# Patient Record
Sex: FEMALE | Race: WHITE | NOT HISPANIC OR LATINO | URBAN - METROPOLITAN AREA
[De-identification: names, ages, dates, MRNs, and addresses within clinical notes are randomized per-mention and may not be internally consistent; named-entity substitution may affect disease eponyms.]

---

## 2017-04-03 ENCOUNTER — APPOINTMENT (RX ONLY)
Dept: URBAN - METROPOLITAN AREA CLINIC 349 | Facility: CLINIC | Age: 62
Setting detail: DERMATOLOGY
End: 2017-04-03

## 2017-04-03 DIAGNOSIS — L98.8 OTHER SPECIFIED DISORDERS OF THE SKIN AND SUBCUTANEOUS TISSUE: ICD-10-CM

## 2017-04-03 PROBLEM — L90.8 OTHER ATROPHIC DISORDERS OF SKIN: Status: ACTIVE | Noted: 2017-04-03

## 2017-04-03 PROCEDURE — ? COUNSELING

## 2017-04-03 PROCEDURE — ? BOTOX

## 2017-04-03 ASSESSMENT — LOCATION SIMPLE DESCRIPTION DERM
LOCATION SIMPLE: GLABELLA
LOCATION SIMPLE: SUPERIOR FOREHEAD
LOCATION SIMPLE: RIGHT TEMPLE
LOCATION SIMPLE: LEFT EYELID

## 2017-04-03 ASSESSMENT — LOCATION DETAILED DESCRIPTION DERM
LOCATION DETAILED: LEFT LATERAL CANTHUS
LOCATION DETAILED: RIGHT INFERIOR TEMPLE
LOCATION DETAILED: SUPERIOR MID FOREHEAD
LOCATION DETAILED: GLABELLA

## 2017-04-03 ASSESSMENT — LOCATION ZONE DERM
LOCATION ZONE: EYELID
LOCATION ZONE: FACE

## 2017-06-05 ENCOUNTER — APPOINTMENT (RX ONLY)
Dept: URBAN - METROPOLITAN AREA CLINIC 349 | Facility: CLINIC | Age: 62
Setting detail: DERMATOLOGY
End: 2017-06-05

## 2017-06-05 DIAGNOSIS — L82.1 OTHER SEBORRHEIC KERATOSIS: ICD-10-CM

## 2017-06-05 DIAGNOSIS — L85.3 XEROSIS CUTIS: ICD-10-CM

## 2017-06-05 DIAGNOSIS — D485 NEOPLASM OF UNCERTAIN BEHAVIOR OF SKIN: ICD-10-CM

## 2017-06-05 DIAGNOSIS — D22 MELANOCYTIC NEVI: ICD-10-CM | Status: STABLE

## 2017-06-05 DIAGNOSIS — Z87.2 PERSONAL HISTORY OF DISEASES OF THE SKIN AND SUBCUTANEOUS TISSUE: ICD-10-CM

## 2017-06-05 DIAGNOSIS — Z85.828 PERSONAL HISTORY OF OTHER MALIGNANT NEOPLASM OF SKIN: ICD-10-CM

## 2017-06-05 DIAGNOSIS — L82.0 INFLAMED SEBORRHEIC KERATOSIS: ICD-10-CM

## 2017-06-05 PROBLEM — D22.5 MELANOCYTIC NEVI OF TRUNK: Status: ACTIVE | Noted: 2017-06-05

## 2017-06-05 PROBLEM — F41.9 ANXIETY DISORDER, UNSPECIFIED: Status: ACTIVE | Noted: 2017-06-05

## 2017-06-05 PROBLEM — D22.72 MELANOCYTIC NEVI OF LEFT LOWER LIMB, INCLUDING HIP: Status: ACTIVE | Noted: 2017-06-05

## 2017-06-05 PROBLEM — D48.5 NEOPLASM OF UNCERTAIN BEHAVIOR OF SKIN: Status: ACTIVE | Noted: 2017-06-05

## 2017-06-05 PROBLEM — D22.71 MELANOCYTIC NEVI OF RIGHT LOWER LIMB, INCLUDING HIP: Status: ACTIVE | Noted: 2017-06-05

## 2017-06-05 PROBLEM — D22.62 MELANOCYTIC NEVI OF LEFT UPPER LIMB, INCLUDING SHOULDER: Status: ACTIVE | Noted: 2017-06-05

## 2017-06-05 PROBLEM — D22.61 MELANOCYTIC NEVI OF RIGHT UPPER LIMB, INCLUDING SHOULDER: Status: ACTIVE | Noted: 2017-06-05

## 2017-06-05 PROBLEM — L55.1 SUNBURN OF SECOND DEGREE: Status: ACTIVE | Noted: 2017-06-05

## 2017-06-05 PROBLEM — K21.9 GASTRO-ESOPHAGEAL REFLUX DISEASE WITHOUT ESOPHAGITIS: Status: ACTIVE | Noted: 2017-06-05

## 2017-06-05 PROCEDURE — ? BODY PHOTOGRAPHY

## 2017-06-05 PROCEDURE — ? COUNSELING

## 2017-06-05 PROCEDURE — 17110 DESTRUCTION B9 LES UP TO 14: CPT

## 2017-06-05 PROCEDURE — ? MEDICAL PHOTOGRAPHY REVIEW

## 2017-06-05 PROCEDURE — ? LIQUID NITROGEN

## 2017-06-05 PROCEDURE — 99214 OFFICE O/P EST MOD 30 MIN: CPT | Mod: 25

## 2017-06-05 PROCEDURE — ? TREATMENT REGIMEN

## 2017-06-05 PROCEDURE — ? OBSERVATION

## 2017-06-05 ASSESSMENT — LOCATION SIMPLE DESCRIPTION DERM
LOCATION SIMPLE: LEFT POSTERIOR UPPER ARM
LOCATION SIMPLE: RIGHT BACK
LOCATION SIMPLE: RIGHT UPPER ARM
LOCATION SIMPLE: UPPER BACK
LOCATION SIMPLE: LEFT PRETIBIAL REGION
LOCATION SIMPLE: LEFT BUTTOCK
LOCATION SIMPLE: RIGHT THIGH
LOCATION SIMPLE: CHEST
LOCATION SIMPLE: LEFT UPPER BACK
LOCATION SIMPLE: RIGHT POSTERIOR THIGH
LOCATION SIMPLE: RIGHT POSTERIOR UPPER ARM
LOCATION SIMPLE: RIGHT PRETIBIAL REGION
LOCATION SIMPLE: LEFT POSTERIOR THIGH
LOCATION SIMPLE: RIGHT KNEE
LOCATION SIMPLE: LEFT CLAVICULAR SKIN
LOCATION SIMPLE: RIGHT UPPER BACK

## 2017-06-05 ASSESSMENT — LOCATION DETAILED DESCRIPTION DERM
LOCATION DETAILED: LEFT DISTAL POSTERIOR THIGH
LOCATION DETAILED: LEFT CLAVICULAR SKIN
LOCATION DETAILED: LEFT BUTTOCK
LOCATION DETAILED: LEFT PROXIMAL POSTERIOR UPPER ARM
LOCATION DETAILED: SUPERIOR THORACIC SPINE
LOCATION DETAILED: RIGHT MEDIAL PROXIMAL PRETIBIAL REGION
LOCATION DETAILED: RIGHT PROXIMAL POSTERIOR UPPER ARM
LOCATION DETAILED: LEFT LATERAL PROXIMAL PRETIBIAL REGION
LOCATION DETAILED: RIGHT SUPERIOR LATERAL UPPER BACK
LOCATION DETAILED: RIGHT SUPERIOR UPPER BACK
LOCATION DETAILED: RIGHT ANTERIOR DISTAL UPPER ARM
LOCATION DETAILED: RIGHT KNEE
LOCATION DETAILED: RIGHT PROXIMAL PRETIBIAL REGION
LOCATION DETAILED: RIGHT ANTERIOR PROXIMAL THIGH
LOCATION DETAILED: UPPER STERNUM
LOCATION DETAILED: RIGHT PROXIMAL POSTERIOR THIGH
LOCATION DETAILED: INFERIOR THORACIC SPINE
LOCATION DETAILED: RIGHT INFERIOR MEDIAL UPPER BACK
LOCATION DETAILED: LEFT INFERIOR MEDIAL UPPER BACK

## 2017-06-05 ASSESSMENT — LOCATION ZONE DERM
LOCATION ZONE: LEG
LOCATION ZONE: ARM
LOCATION ZONE: TRUNK

## 2017-06-05 NOTE — PROCEDURE: MEDICAL PHOTOGRAPHY REVIEW
Detail Level: Detailed
Review Findings: no new or changing lesions
Number Of Photographs Reviewed: 2

## 2017-06-05 NOTE — PROCEDURE: LIQUID NITROGEN
Medical Necessity Information: It is in your best interest to select a reason for this procedure from the list below. All of these items fulfill various CMS LCD requirements except the new and changing color options.
Render Post-Care Instructions In Note?: yes
Detail Level: Detailed
Post-Care Instructions: I reviewed with the patient in detail post-care instructions. Patient is to wear sunprotection, and avoid picking at any of the treated lesions. Pt may apply Vaseline to crusted or scabbing areas.
Medical Necessity Clause: This procedure was medically necessary because the lesions that were treated were:
Add 52 Modifier (Optional): no
Consent: The patient's consent was obtained including but not limited to risks of crusting, scabbing, blistering, scarring, darker or lighter pigmentary change, recurrence, incomplete removal and infection.

## 2017-06-05 NOTE — PROCEDURE: TREATMENT REGIMEN
Detail Level: Detailed
Initiate Treatment: Excipial 20% urea cream apply to affected area twice daily, this was left at checkout for patient

## 2017-06-05 NOTE — PROCEDURE: BODY PHOTOGRAPHY
Was The Entire Body Photographed (Cannot Bill Unless Entire Body Photographed)?: No
Reason For Photography: The patient is obtaining whole body photography to observe existing suspicious moles and or monitor for the appearance of any new lesions.
Number Of Photographs (Optional- Will Not Render If 0): 1
Detail Level: Detailed
Consent: Written consent obtained, risks reviewed for whole body photography. Patient understands that photograph costs may not be covered by insurance, and patient is ultimately responsible for payment.
Whole Body Statement: The whole body was photographed today.

## 2017-08-07 ENCOUNTER — APPOINTMENT (RX ONLY)
Dept: URBAN - METROPOLITAN AREA CLINIC 349 | Facility: CLINIC | Age: 62
Setting detail: DERMATOLOGY
End: 2017-08-07

## 2017-08-07 DIAGNOSIS — L98.8 OTHER SPECIFIED DISORDERS OF THE SKIN AND SUBCUTANEOUS TISSUE: ICD-10-CM

## 2017-08-07 PROBLEM — L55.1 SUNBURN OF SECOND DEGREE: Status: ACTIVE | Noted: 2017-08-07

## 2017-08-07 PROBLEM — L90.8 OTHER ATROPHIC DISORDERS OF SKIN: Status: ACTIVE | Noted: 2017-08-07

## 2017-08-07 PROCEDURE — ? BOTOX

## 2017-08-07 PROCEDURE — ? COUNSELING

## 2017-08-07 ASSESSMENT — LOCATION ZONE DERM
LOCATION ZONE: FACE
LOCATION ZONE: EYELID

## 2017-08-07 ASSESSMENT — LOCATION SIMPLE DESCRIPTION DERM
LOCATION SIMPLE: LEFT EYELID
LOCATION SIMPLE: GLABELLA
LOCATION SIMPLE: RIGHT TEMPLE
LOCATION SIMPLE: SUPERIOR FOREHEAD

## 2017-08-07 ASSESSMENT — LOCATION DETAILED DESCRIPTION DERM
LOCATION DETAILED: RIGHT INFERIOR TEMPLE
LOCATION DETAILED: GLABELLA
LOCATION DETAILED: LEFT LATERAL CANTHUS
LOCATION DETAILED: SUPERIOR MID FOREHEAD

## 2017-10-12 ENCOUNTER — APPOINTMENT (RX ONLY)
Dept: URBAN - METROPOLITAN AREA CLINIC 349 | Facility: CLINIC | Age: 62
Setting detail: DERMATOLOGY
End: 2017-10-12

## 2017-10-12 DIAGNOSIS — D485 NEOPLASM OF UNCERTAIN BEHAVIOR OF SKIN: ICD-10-CM

## 2017-10-12 PROBLEM — D48.5 NEOPLASM OF UNCERTAIN BEHAVIOR OF SKIN: Status: ACTIVE | Noted: 2017-10-12

## 2017-10-12 PROBLEM — C44.712 BASAL CELL CARCINOMA OF SKIN OF RIGHT LOWER LIMB, INCLUDING HIP: Status: ACTIVE | Noted: 2017-10-12

## 2017-10-12 PROCEDURE — 88305 TISSUE EXAM BY PATHOLOGIST: CPT

## 2017-10-12 PROCEDURE — ? COUNSELING

## 2017-10-12 PROCEDURE — 11100: CPT

## 2017-10-12 PROCEDURE — ? OBSERVATION

## 2017-10-12 PROCEDURE — ? PATHOLOGY BILLING

## 2017-10-12 PROCEDURE — A4550 SURGICAL TRAYS: HCPCS

## 2017-10-12 PROCEDURE — ? BIOPSY BY SHAVE METHOD

## 2017-10-12 PROCEDURE — 99213 OFFICE O/P EST LOW 20 MIN: CPT | Mod: 25

## 2017-10-12 ASSESSMENT — LOCATION ZONE DERM: LOCATION ZONE: LEG

## 2017-10-12 ASSESSMENT — LOCATION SIMPLE DESCRIPTION DERM: LOCATION SIMPLE: LEFT PRETIBIAL REGION

## 2017-10-12 ASSESSMENT — LOCATION DETAILED DESCRIPTION DERM: LOCATION DETAILED: LEFT DISTAL PRETIBIAL REGION

## 2017-10-12 NOTE — PROCEDURE: BIOPSY BY SHAVE METHOD
Electrodesiccation And Curettage Text: The wound bed was treated with electrodesiccation and curettage after the biopsy was performed.
Post-Care Instructions: I reviewed with the patient in detail post-care instructions. Patient is to keep the biopsy site dry overnight, and then apply Vaseline  daily until healed. Patient may apply hydrogen peroxide soaks to remove any crusting. After the procedure, the patient was oriented to person, place, and time. Patient denied feeling dizzy, queasy, and and declined further observation after initial 5 minute observation time.
Biopsy Method: Dermablade
Billing Type: Third-Party Bill
Bill 39324 For Specimen Handling/Conveyance To Laboratory?: no
Anesthesia Volume In Cc (Will Not Render If 0): 0.5
X Size Of Lesion In Cm: 0
Bill For Surgical Tray: yes
Electrodesiccation Text: The wound bed was treated with electrodesiccation after the biopsy was performed.
Hemostasis: Aluminum Chloride
Dressing: bandage
Curettage Text: The wound bed was treated with curettage after the biopsy was performed.
Notification Instructions: Patient will be notified of biopsy results. However, patient instructed to call the office if not contacted within 2 weeks. After the procedure, the patient was oriented to person, place, and time. Patient denied feeling dizzy, queasy, and and declined further observation after initial 5 minute observation time.
Type Of Destruction Used: Curettage
Wound Care: Vaseline
Consent: Written consent was obtained and risks were reviewed including but not limited to scarring, infection, bleeding, scabbing, incomplete removal, nerve damage and allergy to anesthesia.
Silver Nitrate Text: The wound bed was treated with silver nitrate after the biopsy was performed.
Accession #: [id number]
Biopsy Type: H and E
Anesthesia Type: 1% lidocaine with epinephrine
Detail Level: Detailed
Cryotherapy Text: The wound bed was treated with cryotherapy after the biopsy was performed.

## 2017-11-13 ENCOUNTER — HOSPITAL ENCOUNTER (OUTPATIENT)
Dept: MAMMOGRAPHY | Age: 62
Discharge: HOME | End: 2017-11-13
Attending: OBSTETRICS & GYNECOLOGY
Payer: COMMERCIAL

## 2017-11-13 DIAGNOSIS — Z12.31 SCREENING MAMMOGRAM, ENCOUNTER FOR: ICD-10-CM

## 2017-11-13 PROCEDURE — 77067 SCR MAMMO BI INCL CAD: CPT

## 2017-11-20 ENCOUNTER — APPOINTMENT (RX ONLY)
Dept: URBAN - METROPOLITAN AREA CLINIC 349 | Facility: CLINIC | Age: 62
Setting detail: DERMATOLOGY
End: 2017-11-20

## 2017-11-20 PROBLEM — K21.9 GASTRO-ESOPHAGEAL REFLUX DISEASE WITHOUT ESOPHAGITIS: Status: ACTIVE | Noted: 2017-11-20

## 2017-11-20 PROBLEM — J30.1 ALLERGIC RHINITIS DUE TO POLLEN: Status: ACTIVE | Noted: 2017-11-20

## 2017-11-20 PROBLEM — L55.1 SUNBURN OF SECOND DEGREE: Status: ACTIVE | Noted: 2017-11-20

## 2017-11-20 PROBLEM — C44.712 BASAL CELL CARCINOMA OF SKIN OF RIGHT LOWER LIMB, INCLUDING HIP: Status: ACTIVE | Noted: 2017-11-20

## 2017-11-20 PROCEDURE — 17263 DSTRJ MAL LES T/A/L 2.1-3.0: CPT

## 2017-11-20 PROCEDURE — ? COUNSELING

## 2017-11-20 PROCEDURE — A4550 SURGICAL TRAYS: HCPCS

## 2017-11-20 PROCEDURE — ? CURETTAGE AND DESTRUCTION

## 2017-11-20 NOTE — PROCEDURE: CURETTAGE AND DESTRUCTION
Number Of Curettages: 3
Cautery Type: electrodesiccation
Render Post-Care Instructions In Note?: no
Size Of Lesion After Curettage: 2.4
What Was Performed First?: Curettage
Detail Level: Detailed
Consent was obtained from the patient. The risks, benefits and alternatives to therapy were discussed in detail. Specifically, the risks of infection, scarring, bleeding, prolonged wound healing, nerve injury, incomplete removal, allergy to anesthesia and recurrence were addressed. Alternatives to ED&C, such as: surgical removal and XRT were also discussed.  Prior to the procedure, the treatment site was clearly identified and confirmed by the patient. All components of Universal Protocol/PAUSE Rule completed.
Bill For Surgical Tray: yes
Anesthesia Type: 1% lidocaine with epinephrine
Anesthesia Volume In Cc: 0
Bill As A Line Item Or As Units: Line Item
Additional Information: (Optional): The wound was cleaned, and a pressure dressing was applied.  The patient received detailed post-op instructions.
Post-Care Instructions: I reviewed with the patient in detail post-care instructions. Patient is to keep the area dry for 48 hours, and not to engage in any swimming until the area is healed. Should the patient develop any fevers, chills, bleeding, severe pain patient will contact the office immediately.

## 2017-12-04 ENCOUNTER — APPOINTMENT (RX ONLY)
Dept: URBAN - METROPOLITAN AREA CLINIC 349 | Facility: CLINIC | Age: 62
Setting detail: DERMATOLOGY
End: 2017-12-04

## 2017-12-04 DIAGNOSIS — L65.9 NONSCARRING HAIR LOSS, UNSPECIFIED: ICD-10-CM

## 2017-12-04 DIAGNOSIS — D22 MELANOCYTIC NEVI: ICD-10-CM | Status: STABLE

## 2017-12-04 DIAGNOSIS — S31000A OPEN WOUND(S) (MULTIPLE) OF UNSPECIFIED SITE(S), WITHOUT MENTION OF COMPLICATION: ICD-10-CM

## 2017-12-04 DIAGNOSIS — Z87.2 PERSONAL HISTORY OF DISEASES OF THE SKIN AND SUBCUTANEOUS TISSUE: ICD-10-CM

## 2017-12-04 DIAGNOSIS — D485 NEOPLASM OF UNCERTAIN BEHAVIOR OF SKIN: ICD-10-CM | Status: RESOLVED

## 2017-12-04 DIAGNOSIS — Z85.828 PERSONAL HISTORY OF OTHER MALIGNANT NEOPLASM OF SKIN: ICD-10-CM

## 2017-12-04 PROBLEM — L85.3 XEROSIS CUTIS: Status: ACTIVE | Noted: 2017-12-04

## 2017-12-04 PROBLEM — D22.72 MELANOCYTIC NEVI OF LEFT LOWER LIMB, INCLUDING HIP: Status: ACTIVE | Noted: 2017-12-04

## 2017-12-04 PROBLEM — D22.71 MELANOCYTIC NEVI OF RIGHT LOWER LIMB, INCLUDING HIP: Status: ACTIVE | Noted: 2017-12-04

## 2017-12-04 PROBLEM — D22.5 MELANOCYTIC NEVI OF TRUNK: Status: ACTIVE | Noted: 2017-12-04

## 2017-12-04 PROBLEM — D22.62 MELANOCYTIC NEVI OF LEFT UPPER LIMB, INCLUDING SHOULDER: Status: ACTIVE | Noted: 2017-12-04

## 2017-12-04 PROBLEM — K21.9 GASTRO-ESOPHAGEAL REFLUX DISEASE WITHOUT ESOPHAGITIS: Status: ACTIVE | Noted: 2017-12-04

## 2017-12-04 PROBLEM — S81.801A UNSPECIFIED OPEN WOUND, RIGHT LOWER LEG, INITIAL ENCOUNTER: Status: ACTIVE | Noted: 2017-12-04

## 2017-12-04 PROBLEM — F41.9 ANXIETY DISORDER, UNSPECIFIED: Status: ACTIVE | Noted: 2017-12-04

## 2017-12-04 PROBLEM — D48.5 NEOPLASM OF UNCERTAIN BEHAVIOR OF SKIN: Status: ACTIVE | Noted: 2017-12-04

## 2017-12-04 PROBLEM — D22.61 MELANOCYTIC NEVI OF RIGHT UPPER LIMB, INCLUDING SHOULDER: Status: ACTIVE | Noted: 2017-12-04

## 2017-12-04 PROCEDURE — ? MEDICAL PHOTOGRAPHY REVIEW

## 2017-12-04 PROCEDURE — ? OBSERVATION

## 2017-12-04 PROCEDURE — 99214 OFFICE O/P EST MOD 30 MIN: CPT

## 2017-12-04 PROCEDURE — ? OTHER

## 2017-12-04 PROCEDURE — ? ORDER TESTS

## 2017-12-04 PROCEDURE — ? COUNSELING

## 2017-12-04 ASSESSMENT — LOCATION SIMPLE DESCRIPTION DERM
LOCATION SIMPLE: RIGHT PRETIBIAL REGION
LOCATION SIMPLE: RIGHT FOREHEAD
LOCATION SIMPLE: LEFT BUTTOCK
LOCATION SIMPLE: RIGHT KNEE
LOCATION SIMPLE: LEFT CLAVICULAR SKIN
LOCATION SIMPLE: SCALP
LOCATION SIMPLE: RIGHT POSTERIOR THIGH
LOCATION SIMPLE: RIGHT UPPER BACK
LOCATION SIMPLE: LEFT UPPER BACK
LOCATION SIMPLE: RIGHT UPPER ARM
LOCATION SIMPLE: LEFT POSTERIOR THIGH
LOCATION SIMPLE: RIGHT THIGH
LOCATION SIMPLE: RIGHT BACK
LOCATION SIMPLE: RIGHT POSTERIOR UPPER ARM
LOCATION SIMPLE: LEFT POSTERIOR UPPER ARM
LOCATION SIMPLE: LEFT PRETIBIAL REGION

## 2017-12-04 ASSESSMENT — LOCATION DETAILED DESCRIPTION DERM
LOCATION DETAILED: RIGHT ANTERIOR DISTAL UPPER ARM
LOCATION DETAILED: RIGHT INFERIOR MEDIAL UPPER BACK
LOCATION DETAILED: RIGHT PROXIMAL PRETIBIAL REGION
LOCATION DETAILED: LEFT INFERIOR MEDIAL UPPER BACK
LOCATION DETAILED: RIGHT ANTERIOR PROXIMAL THIGH
LOCATION DETAILED: LEFT DISTAL POSTERIOR THIGH
LOCATION DETAILED: RIGHT PROXIMAL POSTERIOR THIGH
LOCATION DETAILED: LEFT PROXIMAL POSTERIOR UPPER ARM
LOCATION DETAILED: LEFT SUPERIOR PARIETAL SCALP
LOCATION DETAILED: RIGHT SUPERIOR LATERAL UPPER BACK
LOCATION DETAILED: RIGHT PROXIMAL POSTERIOR UPPER ARM
LOCATION DETAILED: LEFT DISTAL PRETIBIAL REGION
LOCATION DETAILED: LEFT BUTTOCK
LOCATION DETAILED: RIGHT SUPERIOR MEDIAL FOREHEAD
LOCATION DETAILED: RIGHT KNEE
LOCATION DETAILED: RIGHT SUPERIOR UPPER BACK
LOCATION DETAILED: LEFT CLAVICULAR SKIN

## 2017-12-04 ASSESSMENT — LOCATION ZONE DERM
LOCATION ZONE: TRUNK
LOCATION ZONE: SCALP
LOCATION ZONE: FACE
LOCATION ZONE: LEG
LOCATION ZONE: ARM

## 2017-12-04 ASSESSMENT — PAIN INTENSITY VAS: HOW INTENSE IS YOUR PAIN 0 BEING NO PAIN, 10 BEING THE MOST SEVERE PAIN POSSIBLE?: NO PAIN

## 2017-12-04 NOTE — PROCEDURE: OTHER
Other (Free Text): No sign of infection. Advised patient to leave site uncovered while at home.
Detail Level: Detailed
Note Text (......Xxx Chief Complaint.): This diagnosis correlates with the

## 2017-12-11 ENCOUNTER — APPOINTMENT (RX ONLY)
Dept: URBAN - METROPOLITAN AREA CLINIC 349 | Facility: CLINIC | Age: 62
Setting detail: DERMATOLOGY
End: 2017-12-11

## 2017-12-11 DIAGNOSIS — L98.8 OTHER SPECIFIED DISORDERS OF THE SKIN AND SUBCUTANEOUS TISSUE: ICD-10-CM

## 2017-12-11 PROBLEM — L90.8 OTHER ATROPHIC DISORDERS OF SKIN: Status: ACTIVE | Noted: 2017-12-11

## 2017-12-11 PROBLEM — L85.3 XEROSIS CUTIS: Status: ACTIVE | Noted: 2017-12-11

## 2017-12-11 PROBLEM — Z85.828 PERSONAL HISTORY OF OTHER MALIGNANT NEOPLASM OF SKIN: Status: ACTIVE | Noted: 2017-12-11

## 2017-12-11 PROCEDURE — ? COUNSELING

## 2017-12-11 PROCEDURE — ? BOTOX

## 2017-12-11 ASSESSMENT — LOCATION DETAILED DESCRIPTION DERM
LOCATION DETAILED: RIGHT INFERIOR TEMPLE
LOCATION DETAILED: SUPERIOR MID FOREHEAD
LOCATION DETAILED: GLABELLA
LOCATION DETAILED: LEFT LATERAL CANTHUS

## 2017-12-11 ASSESSMENT — LOCATION SIMPLE DESCRIPTION DERM
LOCATION SIMPLE: SUPERIOR FOREHEAD
LOCATION SIMPLE: LEFT EYELID
LOCATION SIMPLE: RIGHT TEMPLE
LOCATION SIMPLE: GLABELLA

## 2017-12-11 ASSESSMENT — LOCATION ZONE DERM
LOCATION ZONE: FACE
LOCATION ZONE: EYELID

## 2018-10-31 PROBLEM — I25.119 CORONARY ARTERY DISEASE INVOLVING NATIVE CORONARY ARTERY OF NATIVE HEART WITH ANGINA PECTORIS (HCC): Status: ACTIVE | Noted: 2018-10-31

## 2018-10-31 PROBLEM — I44.7 LBBB (LEFT BUNDLE BRANCH BLOCK): Status: ACTIVE | Noted: 2018-10-31

## 2018-11-15 ENCOUNTER — HOSPITAL ENCOUNTER (OUTPATIENT)
Dept: MAMMOGRAPHY | Age: 63
Discharge: HOME | End: 2018-11-15
Attending: NURSE PRACTITIONER
Payer: MEDICARE

## 2018-11-15 DIAGNOSIS — Z12.31 VISIT FOR SCREENING MAMMOGRAM: ICD-10-CM

## 2018-11-15 PROCEDURE — 77063 BREAST TOMOSYNTHESIS BI: CPT

## 2018-11-29 ENCOUNTER — HOSPITAL ENCOUNTER (OUTPATIENT)
Dept: LAB | Age: 63
Discharge: HOME | End: 2018-11-29
Payer: MEDICARE

## 2018-11-29 DIAGNOSIS — I25.119 CORONARY ARTERY DISEASE INVOLVING NATIVE CORONARY ARTERY OF NATIVE HEART WITH ANGINA PECTORIS (HCC): ICD-10-CM

## 2018-11-29 LAB
ANION GAP SERPL CALC-SCNC: 6 MMOL/L
BASOPHILS # BLD: 0 K/UL (ref 0–0.2)
BASOPHILS NFR BLD: 1 % (ref 0–2)
BUN SERPL-MCNC: 18 MG/DL (ref 8–23)
CALCIUM SERPL-MCNC: 9.2 MG/DL (ref 8.3–10.4)
CHLORIDE SERPL-SCNC: 100 MMOL/L (ref 98–107)
CO2 SERPL-SCNC: 31 MMOL/L (ref 21–32)
CREAT SERPL-MCNC: 0.7 MG/DL (ref 0.6–1)
DIFFERENTIAL METHOD BLD: NORMAL
EOSINOPHIL # BLD: 0.1 K/UL (ref 0–0.8)
EOSINOPHIL NFR BLD: 2 % (ref 0.5–7.8)
ERYTHROCYTE [DISTWIDTH] IN BLOOD BY AUTOMATED COUNT: 12.6 % (ref 11.9–14.6)
GLUCOSE SERPL-MCNC: 89 MG/DL (ref 65–100)
HCT VFR BLD AUTO: 41.6 % (ref 35.8–46.3)
HGB BLD-MCNC: 13.9 G/DL (ref 11.7–15.4)
IMM GRANULOCYTES # BLD: 0 K/UL (ref 0–0.5)
IMM GRANULOCYTES NFR BLD AUTO: 0 % (ref 0–5)
INR PPP: 1
LYMPHOCYTES # BLD: 1.7 K/UL (ref 0.5–4.6)
LYMPHOCYTES NFR BLD: 29 % (ref 13–44)
MCH RBC QN AUTO: 30.5 PG (ref 26.1–32.9)
MCHC RBC AUTO-ENTMCNC: 33.4 G/DL (ref 31.4–35)
MCV RBC AUTO: 91.4 FL (ref 79.6–97.8)
MONOCYTES # BLD: 0.5 K/UL (ref 0.1–1.3)
MONOCYTES NFR BLD: 8 % (ref 4–12)
NEUTS SEG # BLD: 3.5 K/UL (ref 1.7–8.2)
NEUTS SEG NFR BLD: 61 % (ref 43–78)
NRBC # BLD: 0 K/UL (ref 0–0.2)
PLATELET # BLD AUTO: 185 K/UL (ref 150–450)
PMV BLD AUTO: 10.8 FL (ref 9.4–12.3)
POTASSIUM SERPL-SCNC: 3.7 MMOL/L (ref 3.5–5.1)
PROTHROMBIN TIME: 12.7 SEC (ref 11.5–14.5)
RBC # BLD AUTO: 4.55 M/UL (ref 4.05–5.2)
SODIUM SERPL-SCNC: 137 MMOL/L (ref 136–145)
WBC # BLD AUTO: 5.8 K/UL (ref 4.3–11.1)

## 2018-11-29 PROCEDURE — 36415 COLL VENOUS BLD VENIPUNCTURE: CPT

## 2018-11-29 PROCEDURE — 80048 BASIC METABOLIC PNL TOTAL CA: CPT

## 2018-11-29 PROCEDURE — 85025 COMPLETE CBC W/AUTO DIFF WBC: CPT

## 2018-11-29 PROCEDURE — 85610 PROTHROMBIN TIME: CPT

## 2018-12-02 RX ORDER — ASPIRIN 81 MG/1
81 TABLET ORAL
COMMUNITY

## 2018-12-03 ENCOUNTER — HOSPITAL ENCOUNTER (OUTPATIENT)
Dept: CARDIAC CATH/INVASIVE PROCEDURES | Age: 63
Discharge: HOME | End: 2018-12-03
Attending: INTERNAL MEDICINE | Admitting: INTERNAL MEDICINE
Payer: MEDICARE

## 2018-12-03 VITALS
RESPIRATION RATE: 16 BRPM | WEIGHT: 140 LBS | SYSTOLIC BLOOD PRESSURE: 119 MMHG | DIASTOLIC BLOOD PRESSURE: 63 MMHG | BODY MASS INDEX: 22.5 KG/M2 | HEART RATE: 77 BPM | HEIGHT: 66 IN | OXYGEN SATURATION: 97 %

## 2018-12-03 LAB
ATRIAL RATE: 75 BPM
CALCULATED P AXIS, ECG09: 68 DEGREES
CALCULATED R AXIS, ECG10: 22 DEGREES
CALCULATED T AXIS, ECG11: 89 DEGREES
DIAGNOSIS, 93000: NORMAL
P-R INTERVAL, ECG05: 168 MS
Q-T INTERVAL, ECG07: 416 MS
QRS DURATION, ECG06: 146 MS
QTC CALCULATION (BEZET), ECG08: 464 MS
VENTRICULAR RATE, ECG03: 75 BPM

## 2018-12-03 PROCEDURE — C1769 GUIDE WIRE: HCPCS

## 2018-12-03 PROCEDURE — 77030004534 HC CATH ANGI DX INFN CARD -A

## 2018-12-03 PROCEDURE — 77030019569 HC BND COMPR RAD TERU -B

## 2018-12-03 PROCEDURE — 99152 MOD SED SAME PHYS/QHP 5/>YRS: CPT

## 2018-12-03 PROCEDURE — 74011636320 HC RX REV CODE- 636/320: Performed by: INTERNAL MEDICINE

## 2018-12-03 PROCEDURE — 99153 MOD SED SAME PHYS/QHP EA: CPT

## 2018-12-03 PROCEDURE — 77030015766

## 2018-12-03 PROCEDURE — 74011250636 HC RX REV CODE- 250/636

## 2018-12-03 PROCEDURE — 74011250636 HC RX REV CODE- 250/636: Performed by: INTERNAL MEDICINE

## 2018-12-03 PROCEDURE — 93458 L HRT ARTERY/VENTRICLE ANGIO: CPT

## 2018-12-03 PROCEDURE — 93005 ELECTROCARDIOGRAM TRACING: CPT | Performed by: INTERNAL MEDICINE

## 2018-12-03 PROCEDURE — C1894 INTRO/SHEATH, NON-LASER: HCPCS

## 2018-12-03 PROCEDURE — 74011000250 HC RX REV CODE- 250: Performed by: INTERNAL MEDICINE

## 2018-12-03 PROCEDURE — 74011250637 HC RX REV CODE- 250/637: Performed by: INTERNAL MEDICINE

## 2018-12-03 PROCEDURE — C8929 TTE W OR WO FOL WCON,DOPPLER: HCPCS

## 2018-12-03 RX ORDER — SODIUM CHLORIDE 0.9 % (FLUSH) 0.9 %
5-10 SYRINGE (ML) INJECTION EVERY 8 HOURS
Status: DISCONTINUED | OUTPATIENT
Start: 2018-12-03 | End: 2018-12-03 | Stop reason: HOSPADM

## 2018-12-03 RX ORDER — SODIUM CHLORIDE 9 MG/ML
75 INJECTION, SOLUTION INTRAVENOUS CONTINUOUS
Status: DISCONTINUED | OUTPATIENT
Start: 2018-12-03 | End: 2018-12-03 | Stop reason: HOSPADM

## 2018-12-03 RX ORDER — FENTANYL CITRATE 50 UG/ML
25-100 INJECTION, SOLUTION INTRAMUSCULAR; INTRAVENOUS
Status: DISCONTINUED | OUTPATIENT
Start: 2018-12-03 | End: 2018-12-03 | Stop reason: HOSPADM

## 2018-12-03 RX ORDER — LISINOPRIL 2.5 MG/1
10 TABLET ORAL DAILY
Qty: 90 TAB | Refills: 3 | Status: SHIPPED | OUTPATIENT
Start: 2018-12-03 | End: 2018-12-05 | Stop reason: SDUPTHER

## 2018-12-03 RX ORDER — HEPARIN SODIUM 200 [USP'U]/100ML
2 INJECTION, SOLUTION INTRAVENOUS CONTINUOUS
Status: DISCONTINUED | OUTPATIENT
Start: 2018-12-03 | End: 2018-12-03 | Stop reason: HOSPADM

## 2018-12-03 RX ORDER — LIDOCAINE HYDROCHLORIDE 10 MG/ML
3-10 INJECTION INFILTRATION; PERINEURAL
Status: DISCONTINUED | OUTPATIENT
Start: 2018-12-03 | End: 2018-12-03 | Stop reason: HOSPADM

## 2018-12-03 RX ORDER — MIDAZOLAM HYDROCHLORIDE 1 MG/ML
.5-2 INJECTION, SOLUTION INTRAMUSCULAR; INTRAVENOUS
Status: DISCONTINUED | OUTPATIENT
Start: 2018-12-03 | End: 2018-12-03 | Stop reason: HOSPADM

## 2018-12-03 RX ORDER — DIAZEPAM 5 MG/1
5 TABLET ORAL ONCE
Status: COMPLETED | OUTPATIENT
Start: 2018-12-03 | End: 2018-12-03

## 2018-12-03 RX ORDER — CARVEDILOL 3.12 MG/1
6.25 TABLET ORAL 2 TIMES DAILY WITH MEALS
Qty: 180 TAB | Refills: 3 | Status: SHIPPED | OUTPATIENT
Start: 2018-12-03 | End: 2018-12-05 | Stop reason: SDUPTHER

## 2018-12-03 RX ORDER — SODIUM CHLORIDE 9 MG/ML
250 INJECTION, SOLUTION INTRAVENOUS CONTINUOUS
Status: ACTIVE | OUTPATIENT
Start: 2018-12-03 | End: 2018-12-03

## 2018-12-03 RX ORDER — NITROGLYCERIN 0.4 MG/1
0.4 TABLET SUBLINGUAL
Qty: 25 TAB | Refills: 11 | Status: SHIPPED | OUTPATIENT
Start: 2018-12-03 | End: 2021-02-16 | Stop reason: SDUPTHER

## 2018-12-03 RX ORDER — ROSUVASTATIN CALCIUM 5 MG/1
20 TABLET, COATED ORAL
Qty: 90 TAB | Refills: 3 | Status: SHIPPED | OUTPATIENT
Start: 2018-12-03 | End: 2018-12-05 | Stop reason: SDUPTHER

## 2018-12-03 RX ORDER — SODIUM CHLORIDE 0.9 % (FLUSH) 0.9 %
5-10 SYRINGE (ML) INJECTION AS NEEDED
Status: DISCONTINUED | OUTPATIENT
Start: 2018-12-03 | End: 2018-12-03 | Stop reason: HOSPADM

## 2018-12-03 RX ADMIN — MIDAZOLAM HYDROCHLORIDE 2 MG: 1 INJECTION, SOLUTION INTRAMUSCULAR; INTRAVENOUS at 13:31

## 2018-12-03 RX ADMIN — HEPARIN SODIUM 2 UNITS/HR: 5000 INJECTION, SOLUTION INTRAVENOUS; SUBCUTANEOUS at 13:10

## 2018-12-03 RX ADMIN — HEPARIN SODIUM 2 ML: 10000 INJECTION INTRAVENOUS; SUBCUTANEOUS at 13:36

## 2018-12-03 RX ADMIN — FENTANYL CITRATE 50 MCG: 50 INJECTION, SOLUTION INTRAMUSCULAR; INTRAVENOUS at 13:31

## 2018-12-03 RX ADMIN — FENTANYL CITRATE 50 MCG: 50 INJECTION, SOLUTION INTRAMUSCULAR; INTRAVENOUS at 13:33

## 2018-12-03 RX ADMIN — SODIUM CHLORIDE 75 ML/HR: 900 INJECTION, SOLUTION INTRAVENOUS at 11:36

## 2018-12-03 RX ADMIN — LIDOCAINE HYDROCHLORIDE 4 ML: 10 INJECTION, SOLUTION INFILTRATION; PERINEURAL at 13:34

## 2018-12-03 RX ADMIN — MIDAZOLAM HYDROCHLORIDE 2 MG: 1 INJECTION, SOLUTION INTRAMUSCULAR; INTRAVENOUS at 13:33

## 2018-12-03 RX ADMIN — DIAZEPAM 5 MG: 5 TABLET ORAL at 11:40

## 2018-12-03 RX ADMIN — PERFLUTREN 1 ML: 6.52 INJECTION, SUSPENSION INTRAVENOUS at 12:40

## 2018-12-03 RX ADMIN — IOPAMIDOL 90 ML: 755 INJECTION, SOLUTION INTRAVENOUS at 13:49

## 2018-12-03 NOTE — PROGRESS NOTES
Patient pre-assessment complete for [facility] poss with Dr Torsten Smith   scheduled for 12/3/18 at 12:30am, arrival time 10:30am. Patient verified using . Patient instructed to bring all home medications in labeled bottles on the day of procedure. NPO status reinforced. Patient informed to take a full dose aspirin 325mg  or 81 mg x 4 on the day of procedure. Instructed they can take all other medications excluding vitamins & supplements. Patient verbalizes understanding of all instructions & denies any questions at this time.

## 2018-12-03 NOTE — PROGRESS NOTES
TRANSFER - IN REPORT:    Verbal report received from [employer] on Sarahy Ascencio  being received from [facility] for routine progression of care      Report consisted of patients Situation, Background, Assessment and   Recommendations(SBAR). Information from the following report(s) SBAR was reviewed with the receiving nurse. Opportunity for questions and clarification was provided. Assessment completed upon patients arrival to unit and care assumed.

## 2018-12-03 NOTE — PROGRESS NOTES
TRANSFER - OUT REPORT:    [facility] Dr Anish Grimm  RRA  Diagnostic surg consult  Versed 4 mg  Fentanyl 100 mcg  TR band 14 ml  No bleeding/hematoma  VSS  Pt is a/o no complaints    Verbal report given to Evelia(name) karen Mcfarlane  being transferred to [facility](unit) for routine progression of care       Report consisted of patients Situation, Background, Assessment and   Recommendations(SBAR). Information from the following report(s) SBAR and Procedure Summary was reviewed with the receiving nurse. Lines:   Peripheral IV 12/03/18 Anterior;Right Antecubital (Active)       Peripheral IV 12/03/18 Anterior; Inferior;Left;Lower;Proximal Arm (Active)        Opportunity for questions and clarification was provided.

## 2018-12-03 NOTE — PROCEDURES
[address] [facility] [address] [facility] CATH    Nitin Vu  MR#: [id number]  : 1955  ACCOUNT #: [de-identified]   DATE OF SERVICE: 2018    PRIMARY CARDIOLOGIST:  Dr. Sy Castanon:  Dr. Maicol Fink MD    BRIEF HISTORY:  The patient is a 17-year-old female who was recently seen in consultation for a calcium score of 50 and a CT scan in 2015 suggesting mild coronary artery disease. She has had some increasing dyspnea on exertion and shortness of breath with uncontrolled dyslipidemia in mother with premature atherosclerotic coronary artery disease. She underwent stress testing in the office suggesting high risk anterior infarct pattern with carlos-infarct ischemia and severely reduced left ventricular systolic function, referred for cardiac catheterization. PROCEDURE:  After informed consent, she was prepped and draped in usual sterile fashion. The right wrist was infiltrated with lidocaine. The right radial artery was accessed. A 6-Swedish sheath was advanced. A 5-Swedish Tiger catheter was utilized for left and right coronary injection and a 5-Swedish angled pigtail for left ventriculography. Isovue contrast utilized. CONSCIOUS SEDATION:  Start time 1:29, end time 1:50    MEDICATIONS:  4 mg of Versed and 100 mcg of fentanyl. MONITORING RN:  Grecia Juares    FINDINGS:  1. Left ventricle:  Mild to moderate reduction in LV systolic function, ejection fraction of 40-45%. Left ventricular end-diastolic pressure is measured at 11 mmHg and there is no aortic valve gradient. 2.  Left main:  Left main has moderate calcium with a 30% distal stenosis, bifurcates into LAD and circumflex systems. 3.  Left anterior ascending coronary:  There is moderate proximal calcium with associated 50% ostial stenosis. There appears to be a smooth eccentric 95% mid stenosis and then an 80% concentric distal stenosis.   4.  Left circumflex coronary:  There is a first obtuse marginal which has a 90-95% ostial stenosis and proximal stenosis. This is a very large vessel. There is the ongoing circumflex into the second obtuse marginal.  There is 80-90% stenosis. 5.  Right coronary: This is a moderate size, anatomically dominant vessel. It has a 30-40% mid stenosis, a large posterior descending and posterolateral branch which appear normal.    Successful hemostasis with pneumatic radial band. CONCLUSIONS:  1.  Moderate to severe LV systolic dysfunction with normal end diastolic pressures. 2.  Mixed-plaque atherosclerotic coronary disease involving distal left main, ostial left anterior descending, with critical mid and distal left anterior descending stenosis and critical OM1, OM2 stenosis. Given the complexity of the ostium in the left anterior descending, mild to moderate distal left main stenosis and multivessel coronary disease in a patient with reduced left ventricular systolic function, she will be evaluated for coronary bypass grafting. Thank you for allowing us to participate in the care of this patient. If you have questions or concerns, please feel free to contact me. MD SHAI Goncalves /   D: 12/03/2018 13:59     T: 12/03/2018 14:16  JOB #: [id number]  CC: Daniela Purdy DO  [facility] CARDIOLOGY  [address]  [address] [place] [address], [address] [place] [address]  CC: William Marquez MD  [place] [address] [address], [address] [place] [address]

## 2018-12-03 NOTE — PROGRESS NOTES
1615-patient ambulated to bath room with no difficulties. Right radial with no bleeding or hematoma. 1620- all discharge instructions explained to patient and family. Time allowed for questions no. No questions and concerns at this time. 1625- right radial checked. Site with no redness or bleeding. pt discharged to home via Wheelchair with family.

## 2018-12-03 NOTE — DISCHARGE INSTRUCTIONS
HEART CATHETERIZATION/ANGIOGRAPHY DISCHARGE INSTRUCTIONS    1. Check puncture site frequently for swelling or bleeding. If there is any bleeding, lie down and apply pressure over the area with a clean towel or washcloth and call 911. Notify your doctor for any redness, swelling, drainage, or oozing from the puncture site. Notify your doctor for any fever or chills. 2. If the extremity becomes cold, numb, or painful call [facility] Cardiology at [phone].  3. Activity should be limited for the next 48 hours. No heavy lifting (anything over 10 pounds) for 3 days. No pushing or pulling with the right arm for the next three days. 4. You may resume your usual diet. Drink more fluids than usual.  5. Have a responsible person drive you home and stay with you for at least 24 hours after your heart catheterization/angiography. No driving for 24 hours. 6. You may remove bandage from your ARM in 24 hours. You may shower in 24 hours. No tub baths, hot tubs, or swimming for 1 week. Do not place any lotions, creams, powders, or ointments over puncture site for 1 week. You may place a clean band-aid over the puncture site each day for 5 days. Change daily. 7. Continue all medications as prescribed. I have read the above instructions and have had the opportunity to ask questions.       Patient: ________________________   Date: 12/3/2018    Witness: _______________________   Date: 12/3/2018

## 2018-12-03 NOTE — CONSULTS
Kira Wahl. Jocelyn Khan MD [facility]. Rebel Bills MD             [address] [facility]         12/3/2018        1955    REFERRING PHYSICIAN:  Dr. Hammad Ricardo:  The patient is a 70 y.o. female who is seen for evaluation of CAD. She was seen in the office by Dr. Vance Barrientos for pre-operative evaluation. She has FH of CAD and had noted worsening ARRIAGA. She was prescribed a statin but had not been taking. She didn't have any adverse side effects she just chose not to take it. She had a LBBB. She underwent nuclear stress test that showed a reduced EF of 35% and a large fixed bryan-septal and apical defect. LHC was planned. She underwent cardiac catheterization today that showed severe multivessel disease.        Risk factors include prior tobacco abuse, HTN, dyslipidemia    ** No history of stroke, TIA, prior cardiac surgery, prior vascular surgery, anesthetic complication, lung disease, DVT or PE, GI bleeding       Past Medical History:   Diagnosis Date    Abnormal stress echo 03/2015    Anxiety     Arrhythmia     BCC (basal cell carcinoma of skin)     CAD Nonobstructive 10-20% LAD on CTA Coronary 04/2015    Chronic insomnia     Dyslipidemia     Fibromyalgia     GERD (gastroesophageal reflux disease)     IBS (irritable bowel syndrome)     Ischemic colitis ([facility] [address] 2014    Osteopenia     Overactive bladder     Psychiatric disorder     SCCA (squamous cell carcinoma) of skin        Past Surgical History:   Procedure Laterality Date    HX BREAST BIOPSY Left 1989    HX COLONOSCOPY  2015    WNL    HX ADRIANE AND BSO  1994    HX TONSILLECTOMY      IMPLANT BREAST SILICONE/EQ Bilateral 7422       Family History   Problem Relation Age of Onset    Heart Disease Mother         CABG, [place]   [place].Reus Arthritis-osteo Mother     Cancer Father     Breast Cancer Paternal Grandmother 67       Social History     Socioeconomic History    Marital status:      Spouse name: Not on file    Number of children: Not on file    Years of education: Not on file    Highest education level: Not on file   Social Needs    Financial resource strain: Not on file    Food insecurity - worry: Not on file    Food insecurity - inability: Not on file    Transportation needs - medical: Not on file   [employer] needs - non-medical: Not on file   Occupational History    Occupation: Beautician/Works also at [employer]   Tobacco Use    Smoking status: Former Smoker     Last attempt to quit: 3/1/1984     Years since quittin.7    Smokeless tobacco: Never Used   Substance and Sexual Activity    Alcohol use: Yes     Comment: occ    Drug use: No    Sexual activity: Yes     Partners: Male     Birth control/protection: Surgical   Other Topics Concern    Not on file   Social History Narrative    Not on file       Allergies   Allergen Reactions    Cenestin [Synthetic Conj Estrogens A] Palpitations    Levaquin [Levofloxacin] Nausea and Vomiting    Pcn [Penicillins] Rash    Sulfa Dyne Rash       No current facility-administered medications on file prior to encounter. Current Outpatient Medications on File Prior to Encounter   Medication Sig Dispense Refill    aspirin delayed-release 81 mg tablet Take 81 mg by mouth daily.  pantoprazole (PROTONIX) 40 mg tablet TAKE 1 TABLET BY MOUTH DAILY (Patient taking differently: Take 40 mg by mouth daily. TAKE 1 TABLET BY MOUTH DAILY) 90 Tab 1    citalopram (CELEXA) 10 mg tablet Take 1 Tab by mouth daily. 90 Tab 1    estradiol (ESTRACE) 0.01 % (0.1 mg/gram) vaginal cream 1 gm PV every day X 2 weeks then 1/2 gm twice weekly 42.5 g 5       REVIEW OF SYSTEMS:  Review of Systems   Constitution: Negative for chills, fever, weakness, malaise/fatigue, weight gain and weight loss. HENT: Negative for ear pain, hearing loss, nosebleeds, sore throat and tinnitus. Eyes: Negative for blurred vision, vision loss in left eye and vision loss in right eye.    Cardiovascular: Positive for dyspnea on exertion. Negative for chest pain, leg swelling, near-syncope, orthopnea, palpitations, paroxysmal nocturnal dyspnea and syncope. Respiratory: Positive for shortness of breath. Negative for cough, hemoptysis, sputum production and wheezing. Endocrine: Negative for cold intolerance, heat intolerance and polydipsia. Hematologic/Lymphatic: Does not bruise/bleed easily. Skin: Negative for color change and rash. Musculoskeletal: Negative for back pain, joint pain, joint swelling and myalgias. Gastrointestinal: Negative for abdominal pain, constipation, diarrhea, dysphagia, heartburn, hematemesis, melena, nausea and vomiting. Genitourinary: Negative for dysuria, frequency, hematuria and urgency. Neurological: Negative for difficulty with concentration, dizziness, headaches, light-headedness, numbness, paresthesias, seizures and vertigo. Psychiatric/Behavioral: Negative for altered mental status and depression. Physical Exam  Vitals:    12/03/18 1400 12/03/18 1415 12/03/18 1430 12/03/18 1445   BP: 154/74 141/72 137/73 143/75   Pulse:  76 71 79   Resp:       SpO2:  95% 95% 95%   Weight:       Height:           Physical Exam:  General: Well Developed, Well Nourished, No Acute Distress  HEENT: Normocephalic, pupils equal and round, no scleral icterus  Neck: supple, no JVD  Chest wall: No deformity  Heart: S1S2 with RRR without murmurs or gallops  Lungs: Clear throughout auscultation bilaterally without adventitious sounds  Vascular: Pulses are full and equal. There is no venous stasis disease.   Abd: soft, nontender, nondistended, with good bowel sounds, no pulsatile masses  Ext: warm, no edema, calves supple/nontender, pulses 2+ bilaterally  Skin: warm and dry, no rashes, cyanosis, jaundice, ecchymoses or evidence of skin breakdown  Psychiatric: Normal mood and affect  Neurologic: Alert and oriented X 3, no focal deficit noted    Labs:   Lab Results   Component Value Date/Time Cholesterol, total 223 (H) 04/30/2018 10:15 AM    HDL Cholesterol 50 04/30/2018 10:15 AM    LDL, calculated 157 (H) 04/30/2018 10:15 AM    VLDL, calculated 16 04/30/2018 10:15 AM    Triglyceride 81 04/30/2018 10:15 AM    CHOL/HDL Ratio 4.7 10/24/2016 08:47 AM       Assessment:     Active Problems:    * CAD  HTN  Dyslipidemia  History of Tobacco abuse  Systolic CHF      Plan:     Yariel Ledezma is to see preoperative teaching film that thoroughly discusses procedure, risks, and possible complications. Risks, benefits, and alternatives were discussed to include, but not limited to:     1. Bleeding  2. Arrhythmia   3. Infection including mediastinitis  4. Myocardial infarction  5. Need for reoperation  6. Renal failure  7. Respiratory failure  8. Stroke  9. Potential death      Pt seen and examined with Dr. Richa Casarez. Dr. Richa Casarez has personally viewed the cardiac catheterization, echocardiogram, and labs. The mortality risk for CABG surgery is 0.999%. The patient would like more time before deciding on surgery and deciding on timing of surgery. She will see us back in the office on December 11th for further discussion.        Melyssa Castellano PA-C

## 2018-12-03 NOTE — PROGRESS NOTES
Patient received to [address] [place] [address] room # 8  Ambulatory from [facility]. Patient scheduled for echo/LHC today with Dr Bessie Prescott. Procedure reviewed & questions answered, voiced good understanding consent obtained & placed on chart. All medications and medical history reviewed. Will prep patient per orders. Patient & family updated on plan of care. The patient has a fraility score of 3-MANAGING WELL, based on pt ambulatory and independent of ADL's but c/o ARRIAGA and increased SOB.

## 2018-12-03 NOTE — PROCEDURES
Brief Cardiac Procedure Note    Patient: Dean Rosas MRN: [id number]  SSN: [id number]    YOB: 1955  Age: 70 y.o. Sex: female      Date of Procedure: 12/3/2018     Pre-procedure Diagnosis: Coronary Artery Disease    Post-procedure Diagnosis: Coronary Artery Disease    Procedure: Left Heart Catheterization    Brief Description of Procedure: See note    Performed By: Adrienne Gallo MD     Assistants: None    Anesthesia: Moderate Sedation    Estimated Blood Loss: Less than 10 mL      Specimens: None    Implants: None    Findings:   LV:  EF 35%  LM:  30%  LAD:  40-50% ostial, 90% mid, 80% distal  LCx:  95% OM1, 90% OM2  RCA:  25% mid    Complications: None    Recommendations: Continue medical therapy.     Signed By: Adrienne Gallo MD     December 3, 2018

## 2018-12-06 RX ORDER — AMIODARONE HYDROCHLORIDE 200 MG/1
600 TABLET ORAL ONCE
Status: CANCELLED | OUTPATIENT
Start: 2018-12-13 | End: 2018-12-13

## 2018-12-10 ENCOUNTER — ANESTHESIA EVENT (OUTPATIENT)
Dept: SURGERY | Age: 63
DRG: 226 | End: 2018-12-10
Payer: MEDICARE

## 2018-12-12 ENCOUNTER — HOSPITAL ENCOUNTER (OUTPATIENT)
Dept: ULTRASOUND IMAGING | Age: 63
Discharge: HOME | DRG: 226 | End: 2018-12-12
Attending: PHYSICIAN ASSISTANT
Payer: MEDICARE

## 2018-12-12 ENCOUNTER — HOSPITAL ENCOUNTER (OUTPATIENT)
Dept: SURGERY | Age: 63
Discharge: HOME | DRG: 226 | End: 2018-12-12
Payer: MEDICARE

## 2018-12-12 ENCOUNTER — HOSPITAL ENCOUNTER (OUTPATIENT)
Dept: GENERAL RADIOLOGY | Age: 63
Discharge: HOME | DRG: 226 | End: 2018-12-12
Attending: PHYSICIAN ASSISTANT
Payer: MEDICARE

## 2018-12-12 VITALS
HEIGHT: 66 IN | RESPIRATION RATE: 16 BRPM | OXYGEN SATURATION: 97 % | TEMPERATURE: 98 F | HEART RATE: 78 BPM | SYSTOLIC BLOOD PRESSURE: 151 MMHG | WEIGHT: 141.13 LBS | BODY MASS INDEX: 22.68 KG/M2 | DIASTOLIC BLOOD PRESSURE: 76 MMHG

## 2018-12-12 LAB
ALBUMIN SERPL-MCNC: 3.9 G/DL (ref 3.2–4.6)
ALBUMIN/GLOB SERPL: 1.1 {RATIO} (ref 1.2–3.5)
ALP SERPL-CCNC: 57 U/L (ref 50–136)
ALT SERPL-CCNC: 21 U/L (ref 12–65)
ANION GAP SERPL CALC-SCNC: 4 MMOL/L (ref 7–16)
APPEARANCE UR: CLEAR
APTT PPP: 31.4 SEC (ref 24.7–39.8)
AST SERPL-CCNC: 15 U/L (ref 15–37)
ATRIAL RATE: 65 BPM
BACTERIA SPEC CULT: NORMAL
BILIRUB SERPL-MCNC: 0.2 MG/DL (ref 0.2–1.1)
BILIRUB UR QL: NEGATIVE
BUN SERPL-MCNC: 17 MG/DL (ref 8–23)
CALCIUM SERPL-MCNC: 9.3 MG/DL (ref 8.3–10.4)
CALCULATED P AXIS, ECG09: 68 DEGREES
CALCULATED R AXIS, ECG10: 40 DEGREES
CALCULATED T AXIS, ECG11: 76 DEGREES
CHLORIDE SERPL-SCNC: 105 MMOL/L (ref 98–107)
CO2 SERPL-SCNC: 30 MMOL/L (ref 21–32)
COLOR UR: YELLOW
CREAT SERPL-MCNC: 0.75 MG/DL (ref 0.6–1)
DIAGNOSIS, 93000: NORMAL
ERYTHROCYTE [DISTWIDTH] IN BLOOD BY AUTOMATED COUNT: 12.5 % (ref 11.9–14.6)
EST. AVERAGE GLUCOSE BLD GHB EST-MCNC: 108 MG/DL
GLOBULIN SER CALC-MCNC: 3.6 G/DL (ref 2.3–3.5)
GLUCOSE SERPL-MCNC: 104 MG/DL (ref 65–100)
GLUCOSE UR STRIP.AUTO-MCNC: NEGATIVE MG/DL
HBA1C MFR BLD: 5.4 % (ref 4.8–6)
HCT VFR BLD AUTO: 43.2 % (ref 35.8–46.3)
HGB BLD-MCNC: 13.9 G/DL (ref 11.7–15.4)
HGB UR QL STRIP: NEGATIVE
INR PPP: 1
KETONES UR QL STRIP.AUTO: NEGATIVE MG/DL
LEUKOCYTE ESTERASE UR QL STRIP.AUTO: NEGATIVE
MAGNESIUM SERPL-MCNC: 2.5 MG/DL (ref 1.8–2.4)
MCH RBC QN AUTO: 30.2 PG (ref 26.1–32.9)
MCHC RBC AUTO-ENTMCNC: 32.2 G/DL (ref 31.4–35)
MCV RBC AUTO: 93.7 FL (ref 79.6–97.8)
NITRITE UR QL STRIP.AUTO: NEGATIVE
NRBC # BLD: 0 K/UL (ref 0–0.2)
P-R INTERVAL, ECG05: 170 MS
PH UR STRIP: 7 [PH] (ref 5–9)
PLATELET # BLD AUTO: 203 K/UL (ref 150–450)
PMV BLD AUTO: 11.7 FL (ref 9.4–12.3)
POTASSIUM SERPL-SCNC: 4.4 MMOL/L (ref 3.5–5.1)
PROT SERPL-MCNC: 7.5 G/DL (ref 6.3–8.2)
PROT UR STRIP-MCNC: NEGATIVE MG/DL
PROTHROMBIN TIME: 12.8 SEC (ref 11.7–14.5)
Q-T INTERVAL, ECG07: 434 MS
QRS DURATION, ECG06: 140 MS
QTC CALCULATION (BEZET), ECG08: 451 MS
RBC # BLD AUTO: 4.61 M/UL (ref 4.05–5.2)
SERVICE CMNT-IMP: NORMAL
SODIUM SERPL-SCNC: 139 MMOL/L (ref 136–145)
SP GR UR REFRACTOMETRY: 1.01 (ref 1–1.02)
UROBILINOGEN UR QL STRIP.AUTO: 0.2 EU/DL (ref 0.2–1)
VENTRICULAR RATE, ECG03: 65 BPM
WBC # BLD AUTO: 4.9 K/UL (ref 4.3–11.1)

## 2018-12-12 PROCEDURE — 93005 ELECTROCARDIOGRAM TRACING: CPT | Performed by: PHYSICIAN ASSISTANT

## 2018-12-12 PROCEDURE — 77030027138 HC INCENT SPIROMETER -A

## 2018-12-12 PROCEDURE — 85730 THROMBOPLASTIN TIME PARTIAL: CPT

## 2018-12-12 PROCEDURE — 87086 URINE CULTURE/COLONY COUNT: CPT

## 2018-12-12 PROCEDURE — 85027 COMPLETE CBC AUTOMATED: CPT

## 2018-12-12 PROCEDURE — 83036 HEMOGLOBIN GLYCOSYLATED A1C: CPT

## 2018-12-12 PROCEDURE — 81003 URINALYSIS AUTO W/O SCOPE: CPT

## 2018-12-12 PROCEDURE — 86901 BLOOD TYPING SEROLOGIC RH(D): CPT

## 2018-12-12 PROCEDURE — 87641 MR-STAPH DNA AMP PROBE: CPT

## 2018-12-12 PROCEDURE — 71046 X-RAY EXAM CHEST 2 VIEWS: CPT

## 2018-12-12 PROCEDURE — 93880 EXTRACRANIAL BILAT STUDY: CPT

## 2018-12-12 PROCEDURE — 86920 COMPATIBILITY TEST SPIN: CPT

## 2018-12-12 PROCEDURE — 83735 ASSAY OF MAGNESIUM: CPT

## 2018-12-12 PROCEDURE — 80053 COMPREHEN METABOLIC PANEL: CPT

## 2018-12-12 PROCEDURE — 94010 BREATHING CAPACITY TEST: CPT

## 2018-12-12 PROCEDURE — 85610 PROTHROMBIN TIME: CPT

## 2018-12-12 RX ORDER — CARVEDILOL 3.12 MG/1
3.12 TABLET ORAL 2 TIMES DAILY WITH MEALS
Status: ON HOLD | COMMUNITY
End: 2018-12-19

## 2018-12-12 RX ORDER — AMIODARONE HYDROCHLORIDE 200 MG/1
600 TABLET ORAL ONCE
Status: ACTIVE | OUTPATIENT
Start: 2018-12-13 | End: 2018-12-13

## 2018-12-12 NOTE — PERIOP NOTES
Patient verified name and . Patient provided medical/health information and PTA medications to the best of their ability. TYPE  CASE: 80  Order for consent was found in EHR and matches case posting. Labs per surgeon: cbc,cmp, hgba1c,pt, ptt, magnesium, ua, ucx, mrsa/mssa, all completed and results are in [facility]  Labs per anesthesia protocol: no additional  EKG:  EKG collected and placed with chart, Cardiac Cath, Echo and prior EKG from 12/3/2018 noted in [facility]    Patient provided with and instructed on educaitonal handouts including Heart Guide to Surgery, blood transfusions, pain management, central line infection prevention, being on a ventilator and hand hygiene for the family and community, and [facility] brochure. Instructed that family must be present in building at all times. Incentive spirometry completed with return demonstration. FEV1 completed as ordered. Patient viewed pre-operative DVD. Instructed on chest-xray procedure and carotid ultrasound. Instructed on type and cross match procedure and not to remove green blood bank bracelet. Instructed on medications- Amiodarone, and Mupirocin with Rx called into  [facility] pharmacy at [phone] . Mupirocin ointment to be used the night before surgery and the am of surgery. Patient states has not started on  Carvedilol because mail order for the medication has not arrive at present time. Spoke with Neelam Gonzales NP at Dr Leisa Suarez office and order received to call in carvedilol into local pharmacy. Carvedilol 3.125mg take 2 pills twice a day called into [facility] pharmacy and instructed patient to take medication the night before and the morning of surgery as prescribed    Hibiclens and instructions given per hospital policy.       Instructed patient to continue previous medications as prescribed prior to surgery unless otherwise directed and to take the following medications the day of surgery according to anesthesia guidelines : carvedilol, protonix, celexa and mupirocin nasal ointment . Instructed patient to hold  the following medications: all vitamins,supplements and nsaids    Original medication prescription bottles were visualized during patient appointment. Patient teach back successful and patient demonstrates knowledge of instruction.

## 2018-12-12 NOTE — ANESTHESIA PREPROCEDURE EVALUATION
Anesthetic History   No history of anesthetic complications            Review of Systems / Medical History  Patient summary reviewed and pertinent labs reviewed    Pulmonary  Within defined limits                 Neuro/Psych   Within defined limits           Cardiovascular        Angina: with exertion      CAD    Exercise tolerance: <4 METS: SOB with walking and steps  Comments: Echo (12-3-2018) - EF 30-35%    Cath - moderate/severe LV dysfunction, multiple vessel, EF 40-45%    LBBB   GI/Hepatic/Renal     GERD: well controlled           Endo/Other             Other Findings            Physical Exam    Airway  Mallampati: II  TM Distance: 4 - 6 cm  Neck ROM: normal range of motion   Mouth opening: Normal     Cardiovascular               Dental    Dentition: Full upper dentures and Caps/crowns     Pulmonary                 Abdominal         Other Findings            Anesthetic Plan    ASA: 4  Anesthesia type: general    Monitoring Plan: Arterial line, BIS, CVP, [place] and DUC      Induction: Intravenous  Anesthetic plan and risks discussed with: Patient and Spouse      Given PFO IV lines were meticulously deaired

## 2018-12-13 RX ORDER — SODIUM CHLORIDE, SODIUM LACTATE, POTASSIUM CHLORIDE, CALCIUM CHLORIDE 600; 310; 30; 20 MG/100ML; MG/100ML; MG/100ML; MG/100ML
75 INJECTION, SOLUTION INTRAVENOUS CONTINUOUS
Status: CANCELLED | OUTPATIENT
Start: 2018-12-13

## 2018-12-13 RX ORDER — ONDANSETRON 2 MG/ML
4 INJECTION INTRAMUSCULAR; INTRAVENOUS ONCE
Status: CANCELLED | OUTPATIENT
Start: 2018-12-13 | End: 2018-12-13

## 2018-12-13 RX ORDER — DIPHENHYDRAMINE HYDROCHLORIDE 50 MG/ML
12.5 INJECTION, SOLUTION INTRAMUSCULAR; INTRAVENOUS ONCE
Status: CANCELLED | OUTPATIENT
Start: 2018-12-13 | End: 2018-12-13

## 2018-12-13 RX ORDER — OXYCODONE HYDROCHLORIDE 5 MG/1
5 TABLET ORAL
Status: CANCELLED | OUTPATIENT
Start: 2018-12-13 | End: 2018-12-14

## 2018-12-13 RX ORDER — ACETAMINOPHEN 500 MG
500 TABLET ORAL ONCE
Status: CANCELLED | OUTPATIENT
Start: 2018-12-13 | End: 2018-12-13

## 2018-12-13 RX ORDER — OXYCODONE HYDROCHLORIDE 5 MG/1
10 TABLET ORAL
Status: CANCELLED | OUTPATIENT
Start: 2018-12-13 | End: 2018-12-14

## 2018-12-13 RX ORDER — NALOXONE HYDROCHLORIDE 0.4 MG/ML
0.1 INJECTION, SOLUTION INTRAMUSCULAR; INTRAVENOUS; SUBCUTANEOUS AS NEEDED
Status: CANCELLED | OUTPATIENT
Start: 2018-12-13 | End: 2018-12-13

## 2018-12-13 RX ORDER — HYDROMORPHONE HYDROCHLORIDE 2 MG/ML
0.5 INJECTION, SOLUTION INTRAMUSCULAR; INTRAVENOUS; SUBCUTANEOUS
Status: CANCELLED | OUTPATIENT
Start: 2018-12-13

## 2018-12-13 NOTE — PERIOP NOTES
Component Value Flag Ref Range Units Status   Special Requests:      Preliminary   NO SPECIAL REQUESTS    Culture result: NO GROWTH 1 DAY      Preliminary

## 2018-12-14 ENCOUNTER — HOSPITAL ENCOUNTER (INPATIENT)
Age: 63
LOS: 5 days | Discharge: HOME HEALTH | DRG: 226 | End: 2018-12-19
Attending: THORACIC SURGERY (CARDIOTHORACIC VASCULAR SURGERY) | Admitting: THORACIC SURGERY (CARDIOTHORACIC VASCULAR SURGERY)
Payer: MEDICARE

## 2018-12-14 ENCOUNTER — ANESTHESIA (OUTPATIENT)
Dept: SURGERY | Age: 63
DRG: 226 | End: 2018-12-14
Payer: MEDICARE

## 2018-12-14 ENCOUNTER — ANESTHESIA EVENT (OUTPATIENT)
Dept: SURGERY | Age: 63
DRG: 226 | End: 2018-12-14
Payer: MEDICARE

## 2018-12-14 ENCOUNTER — APPOINTMENT (OUTPATIENT)
Dept: GENERAL RADIOLOGY | Age: 63
DRG: 226 | End: 2018-12-14
Attending: THORACIC SURGERY (CARDIOTHORACIC VASCULAR SURGERY)
Payer: MEDICARE

## 2018-12-14 ENCOUNTER — APPOINTMENT (OUTPATIENT)
Dept: GENERAL RADIOLOGY | Age: 63
DRG: 226 | End: 2018-12-14
Attending: INTERNAL MEDICINE
Payer: MEDICARE

## 2018-12-14 DIAGNOSIS — Z99.11 ENCOUNTER FOR WEANING FROM VENTILATOR (HCC): ICD-10-CM

## 2018-12-14 DIAGNOSIS — Z95.1 S/P CABG X 4: ICD-10-CM

## 2018-12-14 DIAGNOSIS — R09.02 HYPOXIA: ICD-10-CM

## 2018-12-14 LAB
ANION GAP SERPL CALC-SCNC: 10 MMOL/L (ref 7–16)
APTT PPP: 33.5 SEC (ref 24.7–39.8)
ARTERIAL PATENCY WRIST A: ABNORMAL
ARTERIAL PATENCY WRIST A: ABNORMAL
ARTERIAL PATENCY WRIST A: NO
BACTERIA SPEC CULT: NORMAL
BASE DEFICIT BLD-SCNC: 2 MMOL/L
BASE DEFICIT BLD-SCNC: 2 MMOL/L
BASE DEFICIT BLD-SCNC: 4 MMOL/L
BASE DEFICIT BLD-SCNC: 7 MMOL/L
BASE DEFICIT BLD-SCNC: 7 MMOL/L
BASE EXCESS BLD CALC-SCNC: 0 MMOL/L
BASE EXCESS BLD CALC-SCNC: 1 MMOL/L
BASE EXCESS BLD CALC-SCNC: 1 MMOL/L
BASE EXCESS BLD CALC-SCNC: 2 MMOL/L
BDY SITE: ABNORMAL
BODY TEMPERATURE: 98.6
BUN SERPL-MCNC: 14 MG/DL (ref 8–23)
CA-I BLD-MCNC: 1.09 MMOL/L (ref 1.12–1.32)
CA-I BLD-MCNC: 1.09 MMOL/L (ref 1.12–1.32)
CA-I BLD-MCNC: 1.11 MMOL/L (ref 1.12–1.32)
CA-I BLD-MCNC: 1.23 MMOL/L (ref 1.12–1.32)
CA-I BLD-MCNC: 1.26 MMOL/L (ref 1.12–1.32)
CA-I BLD-MCNC: 1.39 MMOL/L (ref 1.12–1.32)
CALCIUM SERPL-MCNC: 7.5 MG/DL (ref 8.3–10.4)
CHLORIDE SERPL-SCNC: 120 MMOL/L (ref 98–107)
CO2 BLD-SCNC: 19 MMOL/L
CO2 BLD-SCNC: 20 MMOL/L
CO2 BLD-SCNC: 23 MMOL/L
CO2 SERPL-SCNC: 19 MMOL/L (ref 21–32)
COLLECT TIME,HTIME: 1229
COLLECT TIME,HTIME: 2025
COLLECT TIME,HTIME: 2225
CREAT SERPL-MCNC: 0.65 MG/DL (ref 0.6–1)
ERYTHROCYTE [DISTWIDTH] IN BLOOD BY AUTOMATED COUNT: 12.6 % (ref 11.9–14.6)
EXHALED MINUTE VOLUME, VE: 6.2 L/MIN
EXHALED MINUTE VOLUME, VE: 6.3 L/MIN
EXHALED MINUTE VOLUME, VE: 8.6 L/MIN
FIBRINOGEN PPP-MCNC: 128 MG/DL (ref 190–501)
GAS FLOW.O2 O2 DELIVERY SYS: ABNORMAL L/MIN
GAS FLOW.O2 SETTING OXYMISER: 16 BPM
GLUCOSE BLD STRIP.AUTO-MCNC: 109 MG/DL (ref 65–100)
GLUCOSE BLD STRIP.AUTO-MCNC: 109 MG/DL (ref 65–100)
GLUCOSE BLD STRIP.AUTO-MCNC: 111 MG/DL (ref 65–100)
GLUCOSE BLD STRIP.AUTO-MCNC: 113 MG/DL (ref 65–100)
GLUCOSE BLD STRIP.AUTO-MCNC: 114 MG/DL (ref 65–100)
GLUCOSE BLD STRIP.AUTO-MCNC: 116 MG/DL (ref 65–100)
GLUCOSE BLD STRIP.AUTO-MCNC: 117 MG/DL (ref 65–100)
GLUCOSE BLD STRIP.AUTO-MCNC: 122 MG/DL (ref 65–100)
GLUCOSE BLD STRIP.AUTO-MCNC: 126 MG/DL (ref 65–100)
GLUCOSE BLD STRIP.AUTO-MCNC: 132 MG/DL (ref 65–100)
GLUCOSE BLD STRIP.AUTO-MCNC: 136 MG/DL (ref 65–100)
GLUCOSE BLD STRIP.AUTO-MCNC: 143 MG/DL (ref 65–100)
GLUCOSE BLD STRIP.AUTO-MCNC: 143 MG/DL (ref 65–100)
GLUCOSE BLD STRIP.AUTO-MCNC: 158 MG/DL (ref 65–100)
GLUCOSE BLD STRIP.AUTO-MCNC: 170 MG/DL (ref 65–100)
GLUCOSE BLD STRIP.AUTO-MCNC: 84 MG/DL (ref 65–100)
GLUCOSE SERPL-MCNC: 159 MG/DL (ref 65–100)
HCO3 BLD-SCNC: 17.8 MMOL/L (ref 22–26)
HCO3 BLD-SCNC: 18.8 MMOL/L (ref 22–26)
HCO3 BLD-SCNC: 21.8 MMOL/L (ref 22–26)
HCO3 BLD-SCNC: 23.1 MMOL/L (ref 22–26)
HCO3 BLD-SCNC: 23.9 MMOL/L (ref 22–26)
HCO3 BLD-SCNC: 25.2 MMOL/L (ref 22–26)
HCO3 BLD-SCNC: 25.6 MMOL/L (ref 22–26)
HCO3 BLD-SCNC: 26.7 MMOL/L (ref 22–26)
HCO3 BLD-SCNC: 28 MMOL/L (ref 22–26)
HCT VFR BLD AUTO: 28 % (ref 35.8–46.3)
HCT VFR BLD AUTO: 28.5 % (ref 35.8–46.3)
HCT VFR BLD AUTO: 29.2 % (ref 35.8–46.3)
HGB BLD-MCNC: 9 G/DL (ref 11.7–15.4)
HGB BLD-MCNC: 9.1 G/DL (ref 11.7–15.4)
HGB BLD-MCNC: 9.3 G/DL (ref 11.7–15.4)
INR PPP: 1.5
MAGNESIUM SERPL-MCNC: 2.2 MG/DL (ref 1.8–2.4)
MAGNESIUM SERPL-MCNC: 2.4 MG/DL (ref 1.8–2.4)
MAGNESIUM SERPL-MCNC: 3.6 MG/DL (ref 1.8–2.4)
MCH RBC QN AUTO: 30.5 PG (ref 26.1–32.9)
MCHC RBC AUTO-ENTMCNC: 31.8 G/DL (ref 31.4–35)
MCV RBC AUTO: 95.7 FL (ref 79.6–97.8)
NRBC # BLD: 0 K/UL (ref 0–0.2)
O2/TOTAL GAS SETTING VFR VENT: 30 %
O2/TOTAL GAS SETTING VFR VENT: 30 %
O2/TOTAL GAS SETTING VFR VENT: 40 %
PCO2 BLD: 34.1 MMHG (ref 35–45)
PCO2 BLD: 39.6 MMHG (ref 35–45)
PCO2 BLD: 39.9 MMHG (ref 35–45)
PCO2 BLD: 40 MMHG (ref 35–45)
PCO2 BLD: 42.2 MMHG (ref 35–45)
PCO2 BLD: 42.7 MMHG (ref 35–45)
PCO2 BLD: 44.7 MMHG (ref 35–45)
PCO2 BLD: 45.6 MMHG (ref 35–45)
PCO2 BLD: 48.8 MMHG (ref 35–45)
PEEP RESPIRATORY: 8 CMH2O
PH BLD: 7.28 [PH] (ref 7.35–7.45)
PH BLD: 7.33 [PH] (ref 7.35–7.45)
PH BLD: 7.34 [PH] (ref 7.35–7.45)
PH BLD: 7.36 [PH] (ref 7.35–7.45)
PH BLD: 7.36 [PH] (ref 7.35–7.45)
PH BLD: 7.37 [PH] (ref 7.35–7.45)
PH BLD: 7.37 [PH] (ref 7.35–7.45)
PH BLD: 7.38 [PH] (ref 7.35–7.45)
PH BLD: 7.39 [PH] (ref 7.35–7.45)
PLATELET # BLD AUTO: 138 K/UL (ref 150–450)
PMV BLD AUTO: 11.8 FL (ref 9.4–12.3)
PO2 BLD: 108 MMHG (ref 75–100)
PO2 BLD: 110 MMHG (ref 75–100)
PO2 BLD: 145 MMHG (ref 75–100)
PO2 BLD: 220 MMHG (ref 75–100)
PO2 BLD: 278 MMHG (ref 75–100)
PO2 BLD: 332 MMHG (ref 75–100)
PO2 BLD: 335 MMHG (ref 75–100)
PO2 BLD: 364 MMHG (ref 75–100)
PO2 BLD: 395 MMHG (ref 75–100)
POTASSIUM BLD-SCNC: 3.8 MMOL/L (ref 3.5–5.1)
POTASSIUM BLD-SCNC: 3.8 MMOL/L (ref 3.5–5.1)
POTASSIUM BLD-SCNC: 4 MMOL/L (ref 3.5–5.1)
POTASSIUM BLD-SCNC: 4.5 MMOL/L (ref 3.5–5.1)
POTASSIUM BLD-SCNC: 5 MMOL/L (ref 3.5–5.1)
POTASSIUM BLD-SCNC: 5 MMOL/L (ref 3.5–5.1)
POTASSIUM SERPL-SCNC: 3.6 MMOL/L (ref 3.5–5.1)
POTASSIUM SERPL-SCNC: 3.8 MMOL/L (ref 3.5–5.1)
POTASSIUM SERPL-SCNC: 4 MMOL/L (ref 3.5–5.1)
PRESSURE SUPPORT SETTING VENT: 12 CMH2O
PRESSURE SUPPORT SETTING VENT: 8 CMH2O
PRESSURE SUPPORT SETTING VENT: 8 CMH2O
PROTHROMBIN TIME: 17.8 SEC (ref 11.7–14.5)
RBC # BLD AUTO: 3.05 M/UL (ref 4.05–5.2)
SAO2 % BLD: 100 % (ref 95–98)
SAO2 % BLD: 98 % (ref 95–98)
SAO2 % BLD: 98 % (ref 95–98)
SAO2 % BLD: 99 % (ref 95–98)
SERVICE CMNT-IMP: 0
SERVICE CMNT-IMP: ABNORMAL
SERVICE CMNT-IMP: NORMAL
SODIUM BLD-SCNC: 142 MMOL/L (ref 136–145)
SODIUM BLD-SCNC: 145 MMOL/L (ref 136–145)
SODIUM BLD-SCNC: 146 MMOL/L (ref 136–145)
SODIUM SERPL-SCNC: 149 MMOL/L (ref 136–145)
SPECIMEN TYPE: ABNORMAL
TOTAL RESP. RATE, ITRR: 13
TOTAL RESP. RATE, ITRR: 13
VENTILATION MODE VENT: ABNORMAL
VT SETTING VENT: 500 ML
WBC # BLD AUTO: 13.7 K/UL (ref 4.3–11.1)

## 2018-12-14 PROCEDURE — C1769 GUIDE WIRE: HCPCS

## 2018-12-14 PROCEDURE — 77030039425 HC BLD LARYNG TRULITE DISP TELE -A: Performed by: NURSE ANESTHETIST, CERTIFIED REGISTERED

## 2018-12-14 PROCEDURE — 74011000258 HC RX REV CODE- 258: Performed by: INTERNAL MEDICINE

## 2018-12-14 PROCEDURE — 77030018834: Performed by: THORACIC SURGERY (CARDIOTHORACIC VASCULAR SURGERY)

## 2018-12-14 PROCEDURE — 85610 PROTHROMBIN TIME: CPT

## 2018-12-14 PROCEDURE — 77030020407 HC IV BLD WRMR ST 3M -A: Performed by: NURSE ANESTHETIST, CERTIFIED REGISTERED

## 2018-12-14 PROCEDURE — 77030031139 HC SUT VCRL2 J&J -A: Performed by: THORACIC SURGERY (CARDIOTHORACIC VASCULAR SURGERY)

## 2018-12-14 PROCEDURE — 77030020263 HC SOL INJ SOD CL0.9% LFCR 1000ML

## 2018-12-14 PROCEDURE — 76010000199 HC CV SURG 4.5 TO 5 HR INTENSV-TIER 1: Performed by: THORACIC SURGERY (CARDIOTHORACIC VASCULAR SURGERY)

## 2018-12-14 PROCEDURE — 77030016564 HC BLD STRNL SAW4 CNMD -B: Performed by: THORACIC SURGERY (CARDIOTHORACIC VASCULAR SURGERY)

## 2018-12-14 PROCEDURE — 77030010939 HC CLP LIG TELE -B: Performed by: THORACIC SURGERY (CARDIOTHORACIC VASCULAR SURGERY)

## 2018-12-14 PROCEDURE — 77030018836 HC SOL IRR NACL ICUM -A: Performed by: THORACIC SURGERY (CARDIOTHORACIC VASCULAR SURGERY)

## 2018-12-14 PROCEDURE — 77030025646 HC AUTOTRNSFUS KT TERU -C: Performed by: THORACIC SURGERY (CARDIOTHORACIC VASCULAR SURGERY)

## 2018-12-14 PROCEDURE — 77030025827 HC BG BLD DNR AUTLG MEDT -A: Performed by: THORACIC SURGERY (CARDIOTHORACIC VASCULAR SURGERY)

## 2018-12-14 PROCEDURE — 74011250636 HC RX REV CODE- 250/636: Performed by: THORACIC SURGERY (CARDIOTHORACIC VASCULAR SURGERY)

## 2018-12-14 PROCEDURE — 77030006247 HC LD PCMKR MYOCRD BPLR TEMP MEDT -B: Performed by: THORACIC SURGERY (CARDIOTHORACIC VASCULAR SURGERY)

## 2018-12-14 PROCEDURE — 77030018571 HC SUT PROL1 J&J -B: Performed by: THORACIC SURGERY (CARDIOTHORACIC VASCULAR SURGERY)

## 2018-12-14 PROCEDURE — 77030002912 HC SUT ETHBND J&J -A: Performed by: THORACIC SURGERY (CARDIOTHORACIC VASCULAR SURGERY)

## 2018-12-14 PROCEDURE — 85027 COMPLETE CBC AUTOMATED: CPT

## 2018-12-14 PROCEDURE — 77030013794 HC KT TRNSDUC BLD EDWD -B: Performed by: NURSE ANESTHETIST, CERTIFIED REGISTERED

## 2018-12-14 PROCEDURE — 06BQ0ZZ EXCISION OF LEFT SAPHENOUS VEIN, OPEN APPROACH: ICD-10-PCS | Performed by: THORACIC SURGERY (CARDIOTHORACIC VASCULAR SURGERY)

## 2018-12-14 PROCEDURE — 84132 ASSAY OF SERUM POTASSIUM: CPT

## 2018-12-14 PROCEDURE — 77030012935 HC DRSG AQUACEL BMS -B

## 2018-12-14 PROCEDURE — 77030010813: Performed by: THORACIC SURGERY (CARDIOTHORACIC VASCULAR SURGERY)

## 2018-12-14 PROCEDURE — 76010000155 HC AUTO TRANSFUSION/CELL SAVER: Performed by: THORACIC SURGERY (CARDIOTHORACIC VASCULAR SURGERY)

## 2018-12-14 PROCEDURE — 0JH609Z INSERTION OF CARDIAC RESYNCHRONIZATION DEFIBRILLATOR PULSE GENERATOR INTO CHEST SUBCUTANEOUS TISSUE AND FASCIA, OPEN APPROACH: ICD-10-PCS | Performed by: INTERNAL MEDICINE

## 2018-12-14 PROCEDURE — 36600 WITHDRAWAL OF ARTERIAL BLOOD: CPT

## 2018-12-14 PROCEDURE — 77030037088 HC TUBE ENDOTRACH ORAL NSL COVD-A: Performed by: NURSE ANESTHETIST, CERTIFIED REGISTERED

## 2018-12-14 PROCEDURE — 77030034927 HC PK PROC CPB INSPIRE PERF LIVA -F: Performed by: THORACIC SURGERY (CARDIOTHORACIC VASCULAR SURGERY)

## 2018-12-14 PROCEDURE — 74011250636 HC RX REV CODE- 250/636

## 2018-12-14 PROCEDURE — 85014 HEMATOCRIT: CPT

## 2018-12-14 PROCEDURE — 77030013292 HC BOWL MX PRSM J&J -A: Performed by: NURSE ANESTHETIST, CERTIFIED REGISTERED

## 2018-12-14 PROCEDURE — 74011000250 HC RX REV CODE- 250: Performed by: THORACIC SURGERY (CARDIOTHORACIC VASCULAR SURGERY)

## 2018-12-14 PROCEDURE — C1751 CATH, INF, PER/CENT/MIDLINE: HCPCS | Performed by: NURSE ANESTHETIST, CERTIFIED REGISTERED

## 2018-12-14 PROCEDURE — 84295 ASSAY OF SERUM SODIUM: CPT

## 2018-12-14 PROCEDURE — 82330 ASSAY OF CALCIUM: CPT

## 2018-12-14 PROCEDURE — 77030015758: Performed by: THORACIC SURGERY (CARDIOTHORACIC VASCULAR SURGERY)

## 2018-12-14 PROCEDURE — C1882 AICD, OTHER THAN SING/DUAL: HCPCS

## 2018-12-14 PROCEDURE — B24BZZ4 ULTRASONOGRAPHY OF HEART WITH AORTA, TRANSESOPHAGEAL: ICD-10-PCS | Performed by: THORACIC SURGERY (CARDIOTHORACIC VASCULAR SURGERY)

## 2018-12-14 PROCEDURE — P9047 ALBUMIN (HUMAN), 25%, 50ML: HCPCS

## 2018-12-14 PROCEDURE — 77030013839 HC OCCL INT FLRST BAXT -B: Performed by: THORACIC SURGERY (CARDIOTHORACIC VASCULAR SURGERY)

## 2018-12-14 PROCEDURE — 77030003010 HC SUT SURG STL J&J -B: Performed by: THORACIC SURGERY (CARDIOTHORACIC VASCULAR SURGERY)

## 2018-12-14 PROCEDURE — 83735 ASSAY OF MAGNESIUM: CPT

## 2018-12-14 PROCEDURE — 85730 THROMBOPLASTIN TIME PARTIAL: CPT

## 2018-12-14 PROCEDURE — 80048 BASIC METABOLIC PNL TOTAL CA: CPT

## 2018-12-14 PROCEDURE — 74011250636 HC RX REV CODE- 250/636: Performed by: ANESTHESIOLOGY

## 2018-12-14 PROCEDURE — 82962 GLUCOSE BLOOD TEST: CPT

## 2018-12-14 PROCEDURE — C1898 LEAD, PMKR, OTHER THAN TRANS: HCPCS

## 2018-12-14 PROCEDURE — 77030002888 HC SUT CHRMC J&J -A: Performed by: THORACIC SURGERY (CARDIOTHORACIC VASCULAR SURGERY)

## 2018-12-14 PROCEDURE — 76937 US GUIDE VASCULAR ACCESS: CPT

## 2018-12-14 PROCEDURE — 3E080GC INTRODUCTION OF OTHER THERAPEUTIC SUBSTANCE INTO HEART, OPEN APPROACH: ICD-10-PCS | Performed by: THORACIC SURGERY (CARDIOTHORACIC VASCULAR SURGERY)

## 2018-12-14 PROCEDURE — P9045 ALBUMIN (HUMAN), 5%, 250 ML: HCPCS

## 2018-12-14 PROCEDURE — 77030005537 HC CATH URETH BARD -A: Performed by: THORACIC SURGERY (CARDIOTHORACIC VASCULAR SURGERY)

## 2018-12-14 PROCEDURE — 71045 X-RAY EXAM CHEST 1 VIEW: CPT

## 2018-12-14 PROCEDURE — 77030008477 HC STYL SATN SLP COVD -A: Performed by: NURSE ANESTHETIST, CERTIFIED REGISTERED

## 2018-12-14 PROCEDURE — 74011000258 HC RX REV CODE- 258

## 2018-12-14 PROCEDURE — 93005 ELECTROCARDIOGRAM TRACING: CPT | Performed by: INTERNAL MEDICINE

## 2018-12-14 PROCEDURE — C1729 CATH, DRAINAGE: HCPCS | Performed by: THORACIC SURGERY (CARDIOTHORACIC VASCULAR SURGERY)

## 2018-12-14 PROCEDURE — 74011250636 HC RX REV CODE- 250/636: Performed by: PHYSICIAN ASSISTANT

## 2018-12-14 PROCEDURE — 77030013797 HC KT TRNSDUC PRSSR EDWD -A: Performed by: THORACIC SURGERY (CARDIOTHORACIC VASCULAR SURGERY)

## 2018-12-14 PROCEDURE — 33225 L VENTRIC PACING LEAD ADD-ON: CPT

## 2018-12-14 PROCEDURE — 74011636320 HC RX REV CODE- 636/320: Performed by: INTERNAL MEDICINE

## 2018-12-14 PROCEDURE — 74011000250 HC RX REV CODE- 250: Performed by: INTERNAL MEDICINE

## 2018-12-14 PROCEDURE — 77030002987 HC SUT PROL J&J -B: Performed by: THORACIC SURGERY (CARDIOTHORACIC VASCULAR SURGERY)

## 2018-12-14 PROCEDURE — 77030008771 HC TU NG SALEM SUMP -A: Performed by: NURSE ANESTHETIST, CERTIFIED REGISTERED

## 2018-12-14 PROCEDURE — 74011000250 HC RX REV CODE- 250

## 2018-12-14 PROCEDURE — 76060000034 HC ANESTHESIA 1.5 TO 2 HR: Performed by: INTERNAL MEDICINE

## 2018-12-14 PROCEDURE — 02100Z9 BYPASS CORONARY ARTERY, ONE ARTERY FROM LEFT INTERNAL MAMMARY, OPEN APPROACH: ICD-10-PCS | Performed by: THORACIC SURGERY (CARDIOTHORACIC VASCULAR SURGERY)

## 2018-12-14 PROCEDURE — 99223 1ST HOSP IP/OBS HIGH 75: CPT | Performed by: INTERNAL MEDICINE

## 2018-12-14 PROCEDURE — 77030027138 HC INCENT SPIROMETER -A

## 2018-12-14 PROCEDURE — 77030006690 HC BLD OPHTH BVR BD -B: Performed by: THORACIC SURGERY (CARDIOTHORACIC VASCULAR SURGERY)

## 2018-12-14 PROCEDURE — 77030034888 HC SUT PROL 2 J&J -B: Performed by: THORACIC SURGERY (CARDIOTHORACIC VASCULAR SURGERY)

## 2018-12-14 PROCEDURE — 77030002933 HC SUT MCRYL J&J -A: Performed by: THORACIC SURGERY (CARDIOTHORACIC VASCULAR SURGERY)

## 2018-12-14 PROCEDURE — 65610000006 HC RM INTENSIVE CARE

## 2018-12-14 PROCEDURE — 82803 BLOOD GASES ANY COMBINATION: CPT

## 2018-12-14 PROCEDURE — 77030009355 HC CRDPLG DEL SET QUES -C: Performed by: THORACIC SURGERY (CARDIOTHORACIC VASCULAR SURGERY)

## 2018-12-14 PROCEDURE — 74011636637 HC RX REV CODE- 636/637: Performed by: THORACIC SURGERY (CARDIOTHORACIC VASCULAR SURGERY)

## 2018-12-14 PROCEDURE — 5A1223Z PERFORMANCE OF CARDIAC PACING, CONTINUOUS: ICD-10-PCS | Performed by: THORACIC SURGERY (CARDIOTHORACIC VASCULAR SURGERY)

## 2018-12-14 PROCEDURE — 021109W BYPASS CORONARY ARTERY, TWO ARTERIES FROM AORTA WITH AUTOLOGOUS VENOUS TISSUE, OPEN APPROACH: ICD-10-PCS | Performed by: THORACIC SURGERY (CARDIOTHORACIC VASCULAR SURGERY)

## 2018-12-14 PROCEDURE — 02HL3KZ INSERTION OF DEFIBRILLATOR LEAD INTO LEFT VENTRICLE, PERCUTANEOUS APPROACH: ICD-10-PCS | Performed by: INTERNAL MEDICINE

## 2018-12-14 PROCEDURE — C1769 GUIDE WIRE: HCPCS | Performed by: THORACIC SURGERY (CARDIOTHORACIC VASCULAR SURGERY)

## 2018-12-14 PROCEDURE — 5A1221Z PERFORMANCE OF CARDIAC OUTPUT, CONTINUOUS: ICD-10-PCS | Performed by: THORACIC SURGERY (CARDIOTHORACIC VASCULAR SURGERY)

## 2018-12-14 PROCEDURE — 77030008467 HC STPLR SKN COVD -B

## 2018-12-14 PROCEDURE — 77030019908 HC STETH ESOPH SIMS -A: Performed by: NURSE ANESTHETIST, CERTIFIED REGISTERED

## 2018-12-14 PROCEDURE — 77030005518 HC CATH URETH FOL 2W BARD -B: Performed by: THORACIC SURGERY (CARDIOTHORACIC VASCULAR SURGERY)

## 2018-12-14 PROCEDURE — 77030005401 HC CATH RAD ARRO -A: Performed by: NURSE ANESTHETIST, CERTIFIED REGISTERED

## 2018-12-14 PROCEDURE — 77030002996 HC SUT SLK J&J -A: Performed by: THORACIC SURGERY (CARDIOTHORACIC VASCULAR SURGERY)

## 2018-12-14 PROCEDURE — 77030018547 HC SUT ETHBND1 J&J -B: Performed by: THORACIC SURGERY (CARDIOTHORACIC VASCULAR SURGERY)

## 2018-12-14 PROCEDURE — 85384 FIBRINOGEN ACTIVITY: CPT

## 2018-12-14 PROCEDURE — 86580 TB INTRADERMAL TEST: CPT | Performed by: THORACIC SURGERY (CARDIOTHORACIC VASCULAR SURGERY)

## 2018-12-14 PROCEDURE — 74011250637 HC RX REV CODE- 250/637: Performed by: THORACIC SURGERY (CARDIOTHORACIC VASCULAR SURGERY)

## 2018-12-14 PROCEDURE — 74011000302 HC RX REV CODE- 302: Performed by: THORACIC SURGERY (CARDIOTHORACIC VASCULAR SURGERY)

## 2018-12-14 PROCEDURE — C1751 CATH, INF, PER/CENT/MIDLINE: HCPCS

## 2018-12-14 PROCEDURE — 77030012890

## 2018-12-14 PROCEDURE — 77030013687 HC GD NDL BARD -B

## 2018-12-14 PROCEDURE — 77030002970 HC SUT PLEDG TELE -A: Performed by: THORACIC SURGERY (CARDIOTHORACIC VASCULAR SURGERY)

## 2018-12-14 PROCEDURE — C1895 LEAD, AICD, ENDO DUAL COIL: HCPCS

## 2018-12-14 PROCEDURE — 33249 INSJ/RPLCMT DEFIB W/LEAD(S): CPT

## 2018-12-14 PROCEDURE — C1887 CATHETER, GUIDING: HCPCS

## 2018-12-14 PROCEDURE — 77030018729 HC ELECTRD DEFIB PAD CARD -B: Performed by: THORACIC SURGERY (CARDIOTHORACIC VASCULAR SURGERY)

## 2018-12-14 PROCEDURE — L3650 SO 8 ABD RESTRAINT PRE OTS: HCPCS

## 2018-12-14 PROCEDURE — 77030020751 HC FLTR TBNG TRNSFUS HAEM -A: Performed by: THORACIC SURGERY (CARDIOTHORACIC VASCULAR SURGERY)

## 2018-12-14 PROCEDURE — 76060000041 HC ANESTHESIA 5 TO 5.5 HR: Performed by: THORACIC SURGERY (CARDIOTHORACIC VASCULAR SURGERY)

## 2018-12-14 PROCEDURE — 77030002520 HC INSRT CLMP LATIS STLTH AMR -B: Performed by: THORACIC SURGERY (CARDIOTHORACIC VASCULAR SURGERY)

## 2018-12-14 PROCEDURE — 77030013861 HC PNCH AORT CLNCUT QUES -B: Performed by: THORACIC SURGERY (CARDIOTHORACIC VASCULAR SURGERY)

## 2018-12-14 PROCEDURE — 74011000258 HC RX REV CODE- 258: Performed by: THORACIC SURGERY (CARDIOTHORACIC VASCULAR SURGERY)

## 2018-12-14 PROCEDURE — C1900 LEAD, CORONARY VENOUS: HCPCS

## 2018-12-14 PROCEDURE — 77030012390 HC DRN CHST BTL GTNG -B: Performed by: THORACIC SURGERY (CARDIOTHORACIC VASCULAR SURGERY)

## 2018-12-14 PROCEDURE — 77030016688: Performed by: THORACIC SURGERY (CARDIOTHORACIC VASCULAR SURGERY)

## 2018-12-14 PROCEDURE — 77030018548 HC SUT ETHBND2 J&J -B: Performed by: THORACIC SURGERY (CARDIOTHORACIC VASCULAR SURGERY)

## 2018-12-14 PROCEDURE — 77030020782 HC GWN BAIR PAWS FLX 3M -B: Performed by: NURSE ANESTHETIST, CERTIFIED REGISTERED

## 2018-12-14 PROCEDURE — C1892 INTRO/SHEATH,FIXED,PEEL-AWAY: HCPCS

## 2018-12-14 PROCEDURE — 77030020751 HC FLTR TBNG TRNSFUS HAEM -A: Performed by: NURSE ANESTHETIST, CERTIFIED REGISTERED

## 2018-12-14 PROCEDURE — 74011250637 HC RX REV CODE- 250/637: Performed by: PHYSICIAN ASSISTANT

## 2018-12-14 PROCEDURE — 02HK3KZ INSERTION OF DEFIBRILLATOR LEAD INTO RIGHT VENTRICLE, PERCUTANEOUS APPROACH: ICD-10-PCS | Performed by: INTERNAL MEDICINE

## 2018-12-14 PROCEDURE — 77030002986 HC SUT PROL J&J -A: Performed by: THORACIC SURGERY (CARDIOTHORACIC VASCULAR SURGERY)

## 2018-12-14 PROCEDURE — 74011000272 HC RX REV CODE- 272: Performed by: INTERNAL MEDICINE

## 2018-12-14 PROCEDURE — 94002 VENT MGMT INPAT INIT DAY: CPT

## 2018-12-14 RX ORDER — ASPIRIN 81 MG/1
81 TABLET ORAL
Status: DISCONTINUED | OUTPATIENT
Start: 2018-12-14 | End: 2018-12-19 | Stop reason: HOSPADM

## 2018-12-14 RX ORDER — SODIUM CHLORIDE 9 MG/ML
INJECTION, SOLUTION INTRAVENOUS
Status: DISCONTINUED | OUTPATIENT
Start: 2018-12-14 | End: 2018-12-14 | Stop reason: HOSPADM

## 2018-12-14 RX ORDER — MAGNESIUM SULFATE 1 G/100ML
1 INJECTION INTRAVENOUS AS NEEDED
Status: DISCONTINUED | OUTPATIENT
Start: 2018-12-14 | End: 2018-12-17

## 2018-12-14 RX ORDER — AMIODARONE HYDROCHLORIDE 200 MG/1
600 TABLET ORAL ONCE
Status: COMPLETED | OUTPATIENT
Start: 2018-12-14 | End: 2018-12-14

## 2018-12-14 RX ORDER — ALBUMIN HUMAN 50 G/1000ML
SOLUTION INTRAVENOUS AS NEEDED
Status: DISCONTINUED | OUTPATIENT
Start: 2018-12-14 | End: 2018-12-14 | Stop reason: HOSPADM

## 2018-12-14 RX ORDER — PROPOFOL 10 MG/ML
0-50 VIAL (ML) INTRAVENOUS
Status: DISCONTINUED | OUTPATIENT
Start: 2018-12-14 | End: 2018-12-17

## 2018-12-14 RX ORDER — SODIUM CHLORIDE 0.9 % (FLUSH) 0.9 %
5-10 SYRINGE (ML) INJECTION AS NEEDED
Status: DISCONTINUED | OUTPATIENT
Start: 2018-12-14 | End: 2018-12-17

## 2018-12-14 RX ORDER — LIDOCAINE HYDROCHLORIDE 20 MG/ML
INJECTION, SOLUTION EPIDURAL; INFILTRATION; INTRACAUDAL; PERINEURAL AS NEEDED
Status: DISCONTINUED | OUTPATIENT
Start: 2018-12-14 | End: 2018-12-14 | Stop reason: HOSPADM

## 2018-12-14 RX ORDER — MORPHINE SULFATE 10 MG/ML
3-5 INJECTION, SOLUTION INTRAMUSCULAR; INTRAVENOUS
Status: DISCONTINUED | OUTPATIENT
Start: 2018-12-14 | End: 2018-12-17

## 2018-12-14 RX ORDER — MIDAZOLAM HYDROCHLORIDE 1 MG/ML
1 INJECTION, SOLUTION INTRAMUSCULAR; INTRAVENOUS
Status: DISCONTINUED | OUTPATIENT
Start: 2018-12-14 | End: 2018-12-17

## 2018-12-14 RX ORDER — SODIUM CHLORIDE, SODIUM LACTATE, POTASSIUM CHLORIDE, CALCIUM CHLORIDE 600; 310; 30; 20 MG/100ML; MG/100ML; MG/100ML; MG/100ML
INJECTION, SOLUTION INTRAVENOUS
Status: DISCONTINUED | OUTPATIENT
Start: 2018-12-14 | End: 2018-12-14 | Stop reason: HOSPADM

## 2018-12-14 RX ORDER — GUAIFENESIN 100 MG/5ML
81 LIQUID (ML) ORAL DAILY
Status: DISCONTINUED | OUTPATIENT
Start: 2018-12-15 | End: 2018-12-14 | Stop reason: SDUPTHER

## 2018-12-14 RX ORDER — BUPIVACAINE HYDROCHLORIDE AND EPINEPHRINE 5; 5 MG/ML; UG/ML
60 INJECTION, SOLUTION EPIDURAL; INTRACAUDAL; PERINEURAL ONCE
Status: COMPLETED | OUTPATIENT
Start: 2018-12-14 | End: 2018-12-14

## 2018-12-14 RX ORDER — AMIODARONE HYDROCHLORIDE 200 MG/1
200 TABLET ORAL EVERY 12 HOURS
Status: DISCONTINUED | OUTPATIENT
Start: 2018-12-14 | End: 2018-12-19 | Stop reason: HOSPADM

## 2018-12-14 RX ORDER — PROPOFOL 10 MG/ML
INJECTION, EMULSION INTRAVENOUS
Status: DISCONTINUED | OUTPATIENT
Start: 2018-12-14 | End: 2018-12-14 | Stop reason: HOSPADM

## 2018-12-14 RX ORDER — NITROGLYCERIN 20 MG/100ML
10-100 INJECTION INTRAVENOUS
Status: DISCONTINUED | OUTPATIENT
Start: 2018-12-14 | End: 2018-12-17

## 2018-12-14 RX ORDER — DEXTROSE, SODIUM CHLORIDE, AND POTASSIUM CHLORIDE 5; .45; .15 G/100ML; G/100ML; G/100ML
25 INJECTION INTRAVENOUS CONTINUOUS
Status: DISCONTINUED | OUTPATIENT
Start: 2018-12-14 | End: 2018-12-17

## 2018-12-14 RX ORDER — HEPARIN SODIUM 1000 [USP'U]/ML
INJECTION, SOLUTION INTRAVENOUS; SUBCUTANEOUS AS NEEDED
Status: DISCONTINUED | OUTPATIENT
Start: 2018-12-14 | End: 2018-12-14 | Stop reason: HOSPADM

## 2018-12-14 RX ORDER — MIDAZOLAM HYDROCHLORIDE 1 MG/ML
INJECTION, SOLUTION INTRAMUSCULAR; INTRAVENOUS AS NEEDED
Status: DISCONTINUED | OUTPATIENT
Start: 2018-12-14 | End: 2018-12-14 | Stop reason: HOSPADM

## 2018-12-14 RX ORDER — SODIUM CHLORIDE 0.9 % (FLUSH) 0.9 %
5-10 SYRINGE (ML) INJECTION EVERY 8 HOURS
Status: DISCONTINUED | OUTPATIENT
Start: 2018-12-14 | End: 2018-12-19 | Stop reason: HOSPADM

## 2018-12-14 RX ORDER — CEFAZOLIN SODIUM 1 G/3ML
INJECTION, POWDER, FOR SOLUTION INTRAMUSCULAR; INTRAVENOUS AS NEEDED
Status: DISCONTINUED | OUTPATIENT
Start: 2018-12-14 | End: 2018-12-14 | Stop reason: HOSPADM

## 2018-12-14 RX ORDER — LIDOCAINE HCL/PF 100 MG/5ML
50-100 SYRINGE (ML) INTRAVENOUS
Status: ACTIVE | OUTPATIENT
Start: 2018-12-14 | End: 2018-12-15

## 2018-12-14 RX ORDER — SODIUM CHLORIDE 9 MG/ML
25 INJECTION, SOLUTION INTRAVENOUS CONTINUOUS
Status: DISCONTINUED | OUTPATIENT
Start: 2018-12-14 | End: 2018-12-17

## 2018-12-14 RX ORDER — AMIODARONE HYDROCHLORIDE 200 MG/1
600 TABLET ORAL ONCE
COMMUNITY
End: 2018-12-19

## 2018-12-14 RX ORDER — OXYCODONE AND ACETAMINOPHEN 5; 325 MG/1; MG/1
1 TABLET ORAL
Status: DISCONTINUED | OUTPATIENT
Start: 2018-12-14 | End: 2018-12-15

## 2018-12-14 RX ORDER — CEFAZOLIN SODIUM/WATER 2 G/20 ML
2 SYRINGE (ML) INTRAVENOUS EVERY 8 HOURS
Status: DISPENSED | OUTPATIENT
Start: 2018-12-14 | End: 2018-12-15

## 2018-12-14 RX ORDER — PROTAMINE SULFATE 10 MG/ML
INJECTION, SOLUTION INTRAVENOUS AS NEEDED
Status: DISCONTINUED | OUTPATIENT
Start: 2018-12-14 | End: 2018-12-14 | Stop reason: HOSPADM

## 2018-12-14 RX ORDER — SODIUM CHLORIDE 0.9 % (FLUSH) 0.9 %
5-10 SYRINGE (ML) INJECTION EVERY 8 HOURS
Status: DISCONTINUED | OUTPATIENT
Start: 2018-12-14 | End: 2018-12-15

## 2018-12-14 RX ORDER — NALOXONE HYDROCHLORIDE 0.4 MG/ML
0.4 INJECTION, SOLUTION INTRAMUSCULAR; INTRAVENOUS; SUBCUTANEOUS AS NEEDED
Status: DISCONTINUED | OUTPATIENT
Start: 2018-12-14 | End: 2018-12-17

## 2018-12-14 RX ORDER — ONDANSETRON 2 MG/ML
4 INJECTION INTRAMUSCULAR; INTRAVENOUS
Status: DISCONTINUED | OUTPATIENT
Start: 2018-12-14 | End: 2018-12-19 | Stop reason: HOSPADM

## 2018-12-14 RX ORDER — ETOMIDATE 2 MG/ML
INJECTION INTRAVENOUS AS NEEDED
Status: DISCONTINUED | OUTPATIENT
Start: 2018-12-14 | End: 2018-12-14 | Stop reason: HOSPADM

## 2018-12-14 RX ORDER — MUPIROCIN 20 MG/G
OINTMENT TOPICAL 2 TIMES DAILY
Status: COMPLETED | OUTPATIENT
Start: 2018-12-14 | End: 2018-12-18

## 2018-12-14 RX ORDER — PAPAVERINE HYDROCHLORIDE 30 MG/ML
INJECTION INTRAMUSCULAR; INTRAVENOUS AS NEEDED
Status: DISCONTINUED | OUTPATIENT
Start: 2018-12-14 | End: 2018-12-14 | Stop reason: HOSPADM

## 2018-12-14 RX ORDER — POTASSIUM CHLORIDE 14.9 MG/ML
10 INJECTION INTRAVENOUS AS NEEDED
Status: DISPENSED | OUTPATIENT
Start: 2018-12-14 | End: 2018-12-15

## 2018-12-14 RX ORDER — CARVEDILOL 3.12 MG/1
3.12 TABLET ORAL 2 TIMES DAILY WITH MEALS
Status: DISCONTINUED | OUTPATIENT
Start: 2018-12-14 | End: 2018-12-16

## 2018-12-14 RX ORDER — SODIUM BICARBONATE 1 MEQ/ML
50 SYRINGE (ML) INTRAVENOUS ONCE
Status: COMPLETED | OUTPATIENT
Start: 2018-12-14 | End: 2018-12-14

## 2018-12-14 RX ORDER — CITALOPRAM 10 MG/1
10 TABLET ORAL DAILY
Status: DISCONTINUED | OUTPATIENT
Start: 2018-12-15 | End: 2018-12-19 | Stop reason: HOSPADM

## 2018-12-14 RX ORDER — CEFAZOLIN SODIUM/WATER 2 G/20 ML
2 SYRINGE (ML) INTRAVENOUS EVERY 8 HOURS
Status: DISCONTINUED | OUTPATIENT
Start: 2018-12-14 | End: 2018-12-14 | Stop reason: SDUPTHER

## 2018-12-14 RX ORDER — ROCURONIUM BROMIDE 10 MG/ML
INJECTION, SOLUTION INTRAVENOUS AS NEEDED
Status: DISCONTINUED | OUTPATIENT
Start: 2018-12-14 | End: 2018-12-14 | Stop reason: HOSPADM

## 2018-12-14 RX ORDER — SODIUM CHLORIDE, SODIUM LACTATE, POTASSIUM CHLORIDE, CALCIUM CHLORIDE 600; 310; 30; 20 MG/100ML; MG/100ML; MG/100ML; MG/100ML
1000 INJECTION, SOLUTION INTRAVENOUS CONTINUOUS
Status: DISCONTINUED | OUTPATIENT
Start: 2018-12-14 | End: 2018-12-14 | Stop reason: HOSPADM

## 2018-12-14 RX ORDER — MIDAZOLAM HYDROCHLORIDE 1 MG/ML
2 INJECTION, SOLUTION INTRAMUSCULAR; INTRAVENOUS
Status: DISCONTINUED | OUTPATIENT
Start: 2018-12-14 | End: 2018-12-14 | Stop reason: HOSPADM

## 2018-12-14 RX ORDER — CHLORHEXIDINE GLUCONATE 1.2 MG/ML
10 RINSE ORAL 2 TIMES DAILY
Status: DISCONTINUED | OUTPATIENT
Start: 2018-12-14 | End: 2018-12-15

## 2018-12-14 RX ORDER — VECURONIUM BROMIDE FOR INJECTION 1 MG/ML
INJECTION, POWDER, LYOPHILIZED, FOR SOLUTION INTRAVENOUS AS NEEDED
Status: DISCONTINUED | OUTPATIENT
Start: 2018-12-14 | End: 2018-12-14 | Stop reason: HOSPADM

## 2018-12-14 RX ORDER — DEXTROSE 50 % IN WATER (D50W) INTRAVENOUS SYRINGE
25 AS NEEDED
Status: DISCONTINUED | OUTPATIENT
Start: 2018-12-14 | End: 2018-12-17

## 2018-12-14 RX ORDER — SODIUM BICARBONATE 1 MEQ/ML
50 SYRINGE (ML) INTRAVENOUS AS NEEDED
Status: DISCONTINUED | OUTPATIENT
Start: 2018-12-14 | End: 2018-12-17

## 2018-12-14 RX ORDER — SUFENTANIL CITRATE 50 UG/ML
INJECTION EPIDURAL; INTRAVENOUS AS NEEDED
Status: DISCONTINUED | OUTPATIENT
Start: 2018-12-14 | End: 2018-12-14 | Stop reason: HOSPADM

## 2018-12-14 RX ORDER — SODIUM CHLORIDE 0.9 % (FLUSH) 0.9 %
5-10 SYRINGE (ML) INJECTION AS NEEDED
Status: DISCONTINUED | OUTPATIENT
Start: 2018-12-14 | End: 2018-12-19 | Stop reason: HOSPADM

## 2018-12-14 RX ORDER — SODIUM CHLORIDE, SODIUM LACTATE, POTASSIUM CHLORIDE, CALCIUM CHLORIDE 600; 310; 30; 20 MG/100ML; MG/100ML; MG/100ML; MG/100ML
INJECTION, SOLUTION INTRAVENOUS
Status: DISCONTINUED | OUTPATIENT
Start: 2018-12-14 | End: 2018-12-14

## 2018-12-14 RX ORDER — LIDOCAINE HYDROCHLORIDE 10 MG/ML
0.1 INJECTION INFILTRATION; PERINEURAL AS NEEDED
Status: DISCONTINUED | OUTPATIENT
Start: 2018-12-14 | End: 2018-12-14 | Stop reason: HOSPADM

## 2018-12-14 RX ORDER — CEFAZOLIN SODIUM/WATER 2 G/20 ML
2 SYRINGE (ML) INTRAVENOUS
Status: COMPLETED | OUTPATIENT
Start: 2018-12-14 | End: 2018-12-14

## 2018-12-14 RX ORDER — ROSUVASTATIN CALCIUM 20 MG/1
20 TABLET, COATED ORAL
Status: DISCONTINUED | OUTPATIENT
Start: 2018-12-14 | End: 2018-12-19 | Stop reason: HOSPADM

## 2018-12-14 RX ORDER — NITROGLYCERIN 20 MG/100ML
INJECTION INTRAVENOUS
Status: DISCONTINUED | OUTPATIENT
Start: 2018-12-14 | End: 2018-12-14 | Stop reason: HOSPADM

## 2018-12-14 RX ORDER — FENTANYL CITRATE 50 UG/ML
100 INJECTION, SOLUTION INTRAMUSCULAR; INTRAVENOUS AS NEEDED
Status: DISCONTINUED | OUTPATIENT
Start: 2018-12-14 | End: 2018-12-14 | Stop reason: HOSPADM

## 2018-12-14 RX ADMIN — MIDAZOLAM HYDROCHLORIDE 2 MG: 1 INJECTION, SOLUTION INTRAMUSCULAR; INTRAVENOUS at 10:22

## 2018-12-14 RX ADMIN — ALBUMIN HUMAN 250 ML: 50 SOLUTION INTRAVENOUS at 11:49

## 2018-12-14 RX ADMIN — ROCURONIUM BROMIDE 50 MG: 10 INJECTION, SOLUTION INTRAVENOUS at 10:22

## 2018-12-14 RX ADMIN — PROTAMINE SULFATE 180 MG: 10 INJECTION, SOLUTION INTRAVENOUS at 10:58

## 2018-12-14 RX ADMIN — SODIUM CHLORIDE 2 UNITS/HR: 900 INJECTION, SOLUTION INTRAVENOUS at 12:20

## 2018-12-14 RX ADMIN — VECURONIUM BROMIDE FOR INJECTION 2 MG: 1 INJECTION, POWDER, LYOPHILIZED, FOR SOLUTION INTRAVENOUS at 08:27

## 2018-12-14 RX ADMIN — BUPIVACAINE HYDROCHLORIDE AND EPINEPHRINE BITARTRATE 50 MG: 5; .005 INJECTION, SOLUTION EPIDURAL; INTRACAUDAL; PERINEURAL at 17:14

## 2018-12-14 RX ADMIN — SUFENTANIL CITRATE 50 MCG: 50 INJECTION EPIDURAL; INTRAVENOUS at 08:30

## 2018-12-14 RX ADMIN — ALBUMIN HUMAN 250 ML: 50 SOLUTION INTRAVENOUS at 11:14

## 2018-12-14 RX ADMIN — SODIUM CHLORIDE 25 ML/HR: 900 INJECTION, SOLUTION INTRAVENOUS at 12:42

## 2018-12-14 RX ADMIN — SODIUM CHLORIDE: 9 INJECTION, SOLUTION INTRAVENOUS at 16:35

## 2018-12-14 RX ADMIN — MIDAZOLAM HYDROCHLORIDE 2 MG: 1 INJECTION, SOLUTION INTRAMUSCULAR; INTRAVENOUS at 07:05

## 2018-12-14 RX ADMIN — POTASSIUM CHLORIDE 10 MEQ: 14.9 INJECTION, SOLUTION INTRAVENOUS at 23:48

## 2018-12-14 RX ADMIN — SODIUM BICARBONATE 50 MEQ: 84 INJECTION, SOLUTION INTRAVENOUS at 21:35

## 2018-12-14 RX ADMIN — SUFENTANIL CITRATE 50 MCG: 50 INJECTION EPIDURAL; INTRAVENOUS at 08:36

## 2018-12-14 RX ADMIN — SODIUM CHLORIDE, SODIUM LACTATE, POTASSIUM CHLORIDE, AND CALCIUM CHLORIDE 1000 ML: 600; 310; 30; 20 INJECTION, SOLUTION INTRAVENOUS at 06:23

## 2018-12-14 RX ADMIN — VECURONIUM BROMIDE FOR INJECTION 2 MG: 1 INJECTION, POWDER, LYOPHILIZED, FOR SOLUTION INTRAVENOUS at 09:40

## 2018-12-14 RX ADMIN — MUPIROCIN: 20 OINTMENT TOPICAL at 19:05

## 2018-12-14 RX ADMIN — VECURONIUM BROMIDE FOR INJECTION 4 MG: 1 INJECTION, POWDER, LYOPHILIZED, FOR SOLUTION INTRAVENOUS at 08:00

## 2018-12-14 RX ADMIN — DEXTROSE, SODIUM CHLORIDE, AND POTASSIUM CHLORIDE 25 ML/HR: 5; .45; .15 INJECTION INTRAVENOUS at 12:44

## 2018-12-14 RX ADMIN — Medication 10 ML: at 15:01

## 2018-12-14 RX ADMIN — IOPAMIDOL 30 ML: 755 INJECTION, SOLUTION INTRAVENOUS at 18:02

## 2018-12-14 RX ADMIN — ROSUVASTATIN CALCIUM 20 MG: 20 TABLET, FILM COATED ORAL at 20:56

## 2018-12-14 RX ADMIN — AMIODARONE HYDROCHLORIDE 600 MG: 200 TABLET ORAL at 06:22

## 2018-12-14 RX ADMIN — SUFENTANIL CITRATE 10 MCG: 50 INJECTION EPIDURAL; INTRAVENOUS at 07:14

## 2018-12-14 RX ADMIN — LIDOCAINE HYDROCHLORIDE 50 MG: 20 INJECTION, SOLUTION EPIDURAL; INFILTRATION; INTRACAUDAL; PERINEURAL at 07:14

## 2018-12-14 RX ADMIN — SUFENTANIL CITRATE 10 MCG: 50 INJECTION EPIDURAL; INTRAVENOUS at 08:09

## 2018-12-14 RX ADMIN — SODIUM CHLORIDE: 9 INJECTION, SOLUTION INTRAVENOUS at 07:30

## 2018-12-14 RX ADMIN — CEFAZOLIN SODIUM 2 G: 1 INJECTION, POWDER, FOR SOLUTION INTRAMUSCULAR; INTRAVENOUS at 16:45

## 2018-12-14 RX ADMIN — ETOMIDATE 10 MG: 2 INJECTION INTRAVENOUS at 07:14

## 2018-12-14 RX ADMIN — PROPOFOL 15 MCG/KG/MIN: 10 INJECTION, EMULSION INTRAVENOUS at 11:05

## 2018-12-14 RX ADMIN — Medication 30 MCG/MIN: at 21:20

## 2018-12-14 RX ADMIN — DEXMEDETOMIDINE 0.2 MCG/KG/HR: 100 INJECTION, SOLUTION, CONCENTRATE INTRAVENOUS at 20:43

## 2018-12-14 RX ADMIN — AMIODARONE HYDROCHLORIDE 200 MG: 200 TABLET ORAL at 20:55

## 2018-12-14 RX ADMIN — NITROGLYCERIN 10 MCG/MIN: 20 INJECTION INTRAVENOUS at 07:56

## 2018-12-14 RX ADMIN — NITROGLYCERIN 10 MCG/MIN: 200 INJECTION, SOLUTION INTRAVENOUS at 16:35

## 2018-12-14 RX ADMIN — OXYCODONE AND ACETAMINOPHEN 1 TABLET: 5; 325 TABLET ORAL at 20:56

## 2018-12-14 RX ADMIN — SODIUM CHLORIDE, SODIUM LACTATE, POTASSIUM CHLORIDE, CALCIUM CHLORIDE: 600; 310; 30; 20 INJECTION, SOLUTION INTRAVENOUS at 07:05

## 2018-12-14 RX ADMIN — SUFENTANIL CITRATE 10 MCG: 50 INJECTION EPIDURAL; INTRAVENOUS at 07:08

## 2018-12-14 RX ADMIN — NEOMYCIN AND POLYMYXIN B SULFATES: 40; 200000 SOLUTION IRRIGATION at 17:13

## 2018-12-14 RX ADMIN — MIDAZOLAM HYDROCHLORIDE 1 MG: 1 INJECTION, SOLUTION INTRAMUSCULAR; INTRAVENOUS at 07:08

## 2018-12-14 RX ADMIN — SUFENTANIL CITRATE 50 MCG: 50 INJECTION EPIDURAL; INTRAVENOUS at 08:32

## 2018-12-14 RX ADMIN — Medication 10 ML: at 21:08

## 2018-12-14 RX ADMIN — VECURONIUM BROMIDE FOR INJECTION 2 MG: 1 INJECTION, POWDER, LYOPHILIZED, FOR SOLUTION INTRAVENOUS at 08:57

## 2018-12-14 RX ADMIN — Medication 2 G: at 07:55

## 2018-12-14 RX ADMIN — SODIUM CHLORIDE: 9 INJECTION, SOLUTION INTRAVENOUS at 11:30

## 2018-12-14 RX ADMIN — CEFAZOLIN SODIUM 2 G: 1 INJECTION, POWDER, FOR SOLUTION INTRAMUSCULAR; INTRAVENOUS at 11:20

## 2018-12-14 RX ADMIN — CHLORHEXIDINE GLUCONATE 10 ML: 1.2 RINSE ORAL at 19:05

## 2018-12-14 RX ADMIN — TUBERCULIN PURIFIED PROTEIN DERIVATIVE 5 UNITS: 5 INJECTION, SOLUTION INTRADERMAL at 20:36

## 2018-12-14 RX ADMIN — MIDAZOLAM HYDROCHLORIDE 1 MG: 1 INJECTION, SOLUTION INTRAMUSCULAR; INTRAVENOUS at 19:05

## 2018-12-14 RX ADMIN — HEPARIN SODIUM 20000 UNITS: 1000 INJECTION, SOLUTION INTRAVENOUS; SUBCUTANEOUS at 09:08

## 2018-12-14 RX ADMIN — ROCURONIUM BROMIDE 50 MG: 10 INJECTION, SOLUTION INTRAVENOUS at 07:14

## 2018-12-14 RX ADMIN — SUFENTANIL CITRATE 20 MCG: 50 INJECTION EPIDURAL; INTRAVENOUS at 08:27

## 2018-12-14 RX ADMIN — Medication 10 MCG/MIN: at 12:20

## 2018-12-14 RX ADMIN — SODIUM CHLORIDE: 9 INJECTION, SOLUTION INTRAVENOUS at 09:40

## 2018-12-14 RX ADMIN — ALBUMIN HUMAN 250 ML: 50 SOLUTION INTRAVENOUS at 11:22

## 2018-12-14 NOTE — PERIOP NOTES
TRANSFER - OUT REPORT:    Verbal report given to [address]  being transferred to CVICU for routine post - op       Report consisted of patients Situation, Background, Assessment and   Recommendations(SBAR). Information from the following report(s) OR Summary and Procedure Summary was reviewed with the receiving nurse. Lines:   Double Lumen 12/14/18 Right Internal jugular (Active)       Frida Nunnery Dual 12/14/18 Right Neck (Active)       Peripheral IV 12/14/18 Right; Lower Arm (Active)   Site Assessment Clean, dry, & intact 12/14/2018  6:35 AM   Phlebitis Assessment 0 12/14/2018  6:35 AM   Infiltration Assessment 0 12/14/2018  6:35 AM   Dressing Status Clean, dry, & intact 12/14/2018  6:35 AM   Dressing Type Tape;Transparent 12/14/2018  6:35 AM   Hub Color/Line Status Infusing 12/14/2018  6:35 AM       Arterial Line 12/14/18 Left Radial artery (Active)        Opportunity for questions and clarification was provided.       Patient transported with:   Monitor

## 2018-12-14 NOTE — ANESTHESIA POSTPROCEDURE EVALUATION
Procedure(s):  ICD. Anesthesia Post Evaluation      Multimodal analgesia: multimodal analgesia not used between 6 hours prior to anesthesia start to PACU discharge  Patient location during evaluation: ICU  Note status: patient sedated. Post-procedure mental status: sedated. Pain management: adequate  Airway patency: intubated on vent. Anesthetic complications: no  Cardiovascular status: hemodynamically stable  Respiratory status: ETT and ventilator  Hydration status: acceptable  Post anesthesia nausea and vomiting:  none    To icu on vent due to heart surgery earlier in the day.   Visit Vitals  /53 (BP Patient Position: At rest;Supine)   Pulse 100   Temp 36.2 °C (97.2 °F)   Resp 18   Ht 5' 6\" (1.676 m)   Wt 62.6 kg (138 lb 1 oz)   SpO2 98%   BMI 22.28 kg/m²

## 2018-12-14 NOTE — CONSULTS
Cardiovascular ICU Consult Note: 2018  Parviz Franco  Admission Date: 2018     The patient's chart is reviewed and the patient is discussed with the staff. Subjective:     69 y/o female Patient is seen at the request of Dr. Padilla Gerard for respiratory management status post cardiac surgery. Patient had CABG x 4. Currently is sedated in CV-ICU and orally intubated receiving  mechanical ventilation. Pre op she had c/oed of increasing sob  And underwent LHC revealing multivessel disease. LV reveald ef of 35%  We have been asked to see in the CV-ICU for mechanical ventilation management and weaning. Prior to Admission Medications   Prescriptions Last Dose Informant Patient Reported? Taking?   amiodarone (CORDARONE) 200 mg tablet 2018 at 1700  Yes Yes   Sig: Take 600 mg by mouth once. aspirin delayed-release 81 mg tablet 2018 at Unknown time  Yes Yes   Sig: Take 81 mg by mouth nightly. carvedilol (COREG) 3.125 mg tablet 2018 at 0430  Yes Yes   Sig: Take 3.125 mg by mouth two (2) times daily (with meals). Take 2 tabs  Twice a day   citalopram (CELEXA) 10 mg tablet 2018 at Unknown time  No Yes   Sig: Take 1 Tab by mouth daily. estradiol (ESTRACE) 0.01 % (0.1 mg/gram) vaginal cream 10/14/2018  No No   Si gm PV every day X 2 weeks then 1/2 gm twice weekly   lisinopril (PRINIVIL, ZESTRIL) 10 mg tablet   No No   Sig: Take 1 Tab by mouth daily. mupirocin calcium (BACTROBAN) 2 % nasal ointment 2018 at Unknown time  Yes Yes   Sig: by Both Nostrils route two (2) times a day. nitroglycerin (NITROSTAT) 0.4 mg SL tablet   No No   Si Tab by SubLINGual route every five (5) minutes as needed for Chest Pain.   pantoprazole (PROTONIX) 40 mg tablet 2018 at Unknown time  No Yes   Sig: TAKE 1 TABLET BY MOUTH DAILY   Patient taking differently: Take 40 mg by mouth daily.  TAKE 1 TABLET BY MOUTH DAILY   rosuvastatin (CRESTOR) 20 mg tablet   No No   Sig: Take 1 Tab by mouth nightly. Facility-Administered Medications: None       Review of Systems  Review of systems not obtained due to patient factors.     Past Medical History:   Diagnosis Date    Abnormal stress echo 2015    Anxiety     Arrhythmia     BCC (basal cell carcinoma of skin)     CAD Nonobstructive 10-20% LAD on CTA Coronary 2015    Chronic insomnia     Dyslipidemia     Fibromyalgia     GERD (gastroesophageal reflux disease)     IBS (irritable bowel syndrome)     Ischemic colitis ([facility] [address] 2014    Osteopenia     Overactive bladder     Psychiatric disorder     SCCA (squamous cell carcinoma) of skin      Past Surgical History:   Procedure Laterality Date    HX BREAST AUGMENTATION      HX BREAST BIOPSY Left     HX  SECTION      HX COLONOSCOPY  2015    WNL    HX ADRIANE AND BSO      HX TONSILLECTOMY      IMPLANT BREAST SILICONE/EQ Bilateral 8581     Social History     Socioeconomic History    Marital status:      Spouse name: Not on file    Number of children: Not on file    Years of education: Not on file    Highest education level: Not on file   Social Needs    Financial resource strain: Not on file    Food insecurity - worry: Not on file    Food insecurity - inability: Not on file   [employer] needs - medical: Not on file   [employer] needs - non-medical: Not on file   Occupational History    Occupation: Beautician/Works also at [employer]   Tobacco Use    Smoking status: Former Smoker     Last attempt to quit: 3/1/1984     Years since quittin.8    Smokeless tobacco: Never Used   Substance and Sexual Activity    Alcohol use: Yes     Comment: occ    Drug use: No    Sexual activity: Yes     Partners: Male     Birth control/protection: Surgical   Other Topics Concern    Not on file   Social History Narrative    Not on file     Family History   Problem Relation Age of Onset    Heart Disease Mother         CABG, MI   Melissa Mo Arthritis-osteo Mother     Cancer Father         stephen, non smoker    Breast Cancer Paternal Grandmother 67     Allergies   Allergen Reactions    Codeine Nausea Only    Cenestin [Synthetic Conj Estrogens A] Palpitations    Doxycycline Nausea Only    Levaquin [Levofloxacin] Nausea and Vomiting    Pcn [Penicillins] Rash    Sulfa Dyne Rash       Current Facility-Administered Medications   Medication Dose Route Frequency    aspirin delayed-release tablet 81 mg  81 mg Oral QHS    carvedilol (COREG) tablet 3.125 mg  3.125 mg Oral BID WITH MEALS    [START ON 12/15/2018] citalopram (CELEXA) tablet 10 mg  10 mg Oral DAILY    rosuvastatin (CRESTOR) tablet 20 mg  20 mg Oral QHS    0.9% sodium chloride infusion  25 mL/hr IntraVENous CONTINUOUS    dextrose 5% - 0.45% NaCl with KCl 20 mEq/L infusion  25 mL/hr IntraVENous CONTINUOUS    sodium chloride (NS) flush 5-10 mL  5-10 mL IntraVENous Q8H    sodium chloride (NS) flush 5-10 mL  5-10 mL IntraVENous PRN    oxyCODONE-acetaminophen (PERCOCET) 5-325 mg per tablet 1 Tab  1 Tab Oral Q4H PRN    morphine 10 mg/ml injection 3-5 mg  3-5 mg IntraVENous Q1H PRN    naloxone (NARCAN) injection 0.4 mg  0.4 mg IntraVENous PRN    mupirocin (BACTROBAN) 2 % ointment   Both Nostrils BID    ceFAZolin (ANCEF) 2 g/20 mL in sterile water IV syringe  2 g IntraVENous Q8H    sodium bicarbonate 8.4 % (1 mEq/mL) injection 50 mEq  50 mEq IntraVENous PRN    EPINEPHrine (ADRENALIN) 4 mg in 0.9% sodium chloride 250 mL infusion  0.05-0.1 mcg/kg/min IntraVENous TITRATE    nitroglycerin (Tridil) 200 mcg/ml infusion   mcg/min IntraVENous TITRATE    PHENYLephrine (RENZO-SYNEPHRINE) 30 mg in 0.9% sodium chloride 250 mL infusion   mcg/min IntraVENous TITRATE    lidocaine (PF) (XYLOCAINE) 100 mg/5 mL (2 %) injection syringe  mg   mg IntraVENous ONCE PRN    amiodarone (CORDARONE) tablet 200 mg  200 mg Oral Q12H    ondansetron (ZOFRAN) injection 4 mg  4 mg IntraVENous Q4H PRN    insulin regular (NOVOLIN R, HUMULIN R) 100 Units in 0.9% sodium chloride 100 mL infusion  1 Units/hr IntraVENous TITRATE    dextrose (D50W) injection syrg 12.5 g  25 mL IntraVENous PRN    magnesium sulfate 1 g/100 ml IVPB (premix or compounded)  1 g IntraVENous PRN    potassium chloride 10 mEq in 50 ml IVPB  10 mEq IntraVENous PRN    midazolam (VERSED) injection 1 mg  1 mg IntraVENous Q1H PRN    propofol (DIPRIVAN) infusion  0-50 mcg/kg/min IntraVENous TITRATE    chlorhexidine (PERIDEX) 0.12 % mouthwash 10 mL  10 mL Oral BID    tuberculin injection 5 Units  5 Units IntraDERMal ONCE         Objective:     Vitals:    12/14/18 0617 12/14/18 1205 12/14/18 1207 12/14/18 1210   BP: 135/70 (!) 93/39  100/54   Pulse: 72 78 77 77   Resp: 18 27 16 15   Temp: 98 °F (36.7 °C) 98.8 °F (37.1 °C)  98.8 °F (37.1 °C)   SpO2: 100% 100% 99% 100%   Weight: 138 lb 1 oz (62.6 kg)      Height: 5' 6\" (1.676 m)          Intake and Output:   No intake/output data recorded. 12/14 0701 - 12/14 1900  In: 3000 [I.V.:2500]  Out: 2533 [Urine:1740]    Physical Exam:          Constitutional:  Sedated, orally intubated and mechanically ventilated. EENMT:  Sclera clear, pupils equal, oral mucosa moist and orally intubated  Respiratory: clear  Cardiovascular:  RRR with no M,G,R;  Gastrointestinal:  soft; no bowel sounds present  Musculoskeletal:  warm with no cyanosis, n lower leg edema. [place] site left leg with ace wrap. Sedated with no movements. SKIN:  no jaundice or ecchymosis   Neurologic:  sedated but no gross neuro deficits  Psychiatric:  sedated and unable to assess at this time    CXR:        LINES:  ETT, turner, swan burt, arterial line, chest tubes times 2 in epigastric area without air leak.     DRIPS:  Willie, ntg, epi, insulin, propofol    CI:  2.3    Ventilator Settings  Mode FIO2 Rate Tidal Volume Pressure PEEP   Pressure support  40 %    500 ml  12 cm H2O  8 cm H20      Peak airway pressure: 21 cm H2O   Minute ventilation: 8 l/min     ABG:   Recent Labs     12/14/18  1233 12/14/18  1115 12/14/18  1045   PHI 7.326* 7.356 7.390   PCO2I 34.1* 42.7 42.2   PO2I 145* 335* 332*   HCO3I 17.8* 23.9 25.6        LAB  Recent Labs     12/14/18  1236 12/12/18  0912   WBC 13.7* 4.9   HGB 9.3* 13.9   HCT 29.2* 43.2   * 203   INR 1.5 1.0     Recent Labs     12/12/18  0912      K 4.4      CO2 30   *   BUN 17   CREA 0.75   MG 2.5*   CA 9.3   ALB 3.9   SGOT 15     No results for input(s): LCAD, LAC in the last 72 hours. Assessment and Plan :  (Medical Decision Making)     Hospital Problems  Date Reviewed: 11/29/2018          Codes Class Noted POA    Encounter for weaning from ventilator [facility]) ICD-10-CM: Z99.11  ICD-9-CM: V46.13  12/14/2018 Unknown        Hypoxia ICD-10-CM: R09.02  ICD-9-CM: 799.02  12/14/2018 Unknown        S/P CABG x 4 ICD-10-CM: Z95.1  ICD-9-CM: V45.81  12/14/2018 Unknown        CAD Nonobstructive 10-20% LAD on CTA Coronary ICD-10-CM: I25.10  ICD-9-CM: 414.00  4/1/2015 Unknown              Plan:   --Wean mechanical ventilation per protocol. --Bronchodilators per protocol. --Incentive spirometry every hour post extubation. --Review CXR    More than 50% of the time documented was spent in face-to-face contact with the patient and in the care of the patient on the floor/unit where the patient is located. Thank you for this referral.  We appreciate the opportunity to participate in this patient's care. Will follow along with you.     Darrin Oviedo MD

## 2018-12-14 NOTE — PROGRESS NOTES
Guideline     Guideline Number: -[id number]     Title: Management of the Patient with Mechanical Ventilation (including weaning) and ABCDE Bundle    Effective Date:  03/00    Revised Date: 02/09, 03/10, 7/12, 5/13,                                  10/13, 8/14  Reviewed Date: 07/2015, 04/2016, 06/2017       I. Policy:  Management of the patient requiring mechanical ventilation, including readiness to wean and weaning protocol. The information provided serves as a guideline for patient management. Included in this guidelines is the ABCDE Bundle to provide guidance to staff for evidence based management of pain, agitation/anxiety and delirium in the intensive care unit. The goals of critical care analgesia and sedation are to facilitate mechanical ventilation, to prevent patient and caregiver injury, and to avoid the psychological and physiologic consequences of inadequate treatment of pain, anxiety, agitation, and delirium by maintaining a light level of sedation. Pain occurs commonly in adult ICU patients, regardless of admitting diagnosis. Therefore, pain should be frequently assessed and analgesic medications titrated to prevent adverse effects associated with either inadequate or excessive analgesia. Once pain has been addressed, anxiolytic and/or antipsychotic medications can be utilized to treat unresolved agitation/anxiety and delirium, with the goal to prevent over- or under sedation by using the Wray Agitation Sedation Scale (RASS). Assertive management of these issues has been shown to reduce costs, improve ICU outcomes such as successful extubation and ICU length of stay, and allow for patients to participate in their own care. II. Purpose: The respiratory care practitioner and the critical care RN will utilize the following guideline to provide the most efficient and effective management of mechanical ventilators and weaning and extubation processes.     Goals of Treatment:  1. Adequate management of patients pain and discomfort while maintaining a light level of                   sedation (RASS score of 0 to -1)  2. Both chronic and acute sources of pain should be identified and treated. 3. Sedative agents should be considered if patient still expresses discomfort and/or is not at RASS         goal of 0 to -1 despite adequate management of pain. 4. Patients requiring neuromuscular blockage must have continuous infusions of analgesic and              sedative agents. III. Responsibility: Director Respiratory Care Services and all Respiratory Care Practitioners  with documented competency as well as Critical Care RN staff. General Guidelines    1. Introduction to Ventilator Plan Phase  a. Ventilator Management Phase, General Statement  -  The plan should be initiated in patients who have a secure airway/require invasive mechanical ventilation (endotracheal or tracheostomy) only  -   The provider determines the appropriate medications used for analgesia and agitation/anxiety along with the clinical pharmacist    2. Monitoring Levels of Comfort  a. Pain Assessment  - Pain is monitored using the numerical scales  - A pain assessment should be conducted, at a minimum, every 4 hours and as needed and per guidelines. - The level of pain should be determined as satisfactory by the patient based on patients baseline level of pain , considering any chronic pain that the patient may have. - If the patient is unable to communicate pain level, the nurse can assess for nonverbal indicators including facial grimacing, moaning, tachypnea, tachycardia, hypertension, diaphoresis, etc as a cue to begin further pain assessment.             b.  Sedation Assessment  - Sedation is monitored using the Wray Agitation Sedation Scale (RASS)  Target RASS RASS Description   +4 Combative, violent, danger to staff     +3 Pulls or removes tubes(s) or catheters; aggressive   +2 Frequent nonpurposeful movement, fights ventilator   +1 Anxious, apprehensive, but not aggressive   0 Alert and calm   -1 Awakens to voice (eye opening/contact) > 10 sec   -2 Light sedation, briefly awakens to voice (eye opening/contact) < 10 sec   -3 Moderate sedation, movement or eye opening. No eye contact   -4 Deep sedation, no response to voice, but movement or eye opening to physical stimulation   -5 Unarousable, no response to voice or physical stimulation     -  Goal RASS is 0 to -1, unless otherwise specified by providers order.  - Nursing staff should conduct the RASS every 4 hours and as needed to maintain goal RASS of 0 to -1.  - If RASS is outside of goal range, discuss treatment options with provider.  - A RASS score of +2 to +4 requires further assessment by the nurse. Causes of agitation/anxiety that should be considered include:  a. Pulmonary -   endotracheal tube malposition or patency, mode of ventilation, pneumothorax, hypoxemia, hypercarbia  b. Metabolic  hypoglycemia, hyponatremia, acute renal or hepatic failure  c. Emotional upset  with information and awareness of critical condition, prognosis, need for surgical or invasive procedures, other interventions or complications, family or personal stressors  - C. Sedation Assessment while using Neuromusclar Blocking Agents  - D. Delirium Assessment  a. the ICU ([facility]  ICU)   3. Analgesia  The incidence of significant pain has been reported to be 50% or higher in both medical and surgical ICU patients. These patients also experience discomfort during routine/procedural ICU care and at rest.  However, patients may be unable to self-report their pain (either verbally or with other signs) because of an altered level of consciousness, the use of mechanical ventilation, or high doses of sedative agents or neuromuscular blocking agents.   The short and long term consequences of unrelieved or inadequately treated pain are significant and include patient discomfort, decreased satisfaction with care by family and patient, delirium, agitation/anxiety, post traumatic stress disorder and depression. Therefore, routine assessment and treatment of pain should occur in all ICU patients. Causes and Treatment of Pain in the ICU  a. Acute pain (post-operative, procedural pain, discomfort with usual ICU care or other acute episodes of pain-related to underlying disease)  1. Consider use of PCA for alert and oriented patients with pain needs not met by PRN dosing or opoids. 2. Preemptive analgesia should occur prior to chest tube removal, and should be considered for other procedural pain such as turning and repositioning, would drain removal, wound dressing change, tracheal suctioning, femoral catheter removal or place of central venous catheter. 3. Appropriate analgesic medications for preemptive analgesia are short acting intravenous (IV) agents (i.e. fentanyl, morphine, hydromorphone)  a. Administration of analgesia before patient experiences noxious stimuli prevents amplification and hyperexcitability of the central nervous system. b. Analgesia for Mechanically Ventilated Patients:  1. The approach to sedation and analgesia management for mechanically ventilated patients favors use of analgesia first sedation. The primary goal of this strategy is to address pain and discomfort first, and then if necessary, add anxiolytic agent. 2. Analgesia first sedation reduces dose escalation of medications, decreases the duration of mechanical ventilation and the incidence of VAP, improves the probability of successful extubation, and ultimately shortens ICU length of stay. 3. For pain management, analgesic medications are determined by the provider. Intermittent dosing of the analgesic should be attempted first.    If the patient requires more than 3 doses within 1 hour then provider should be contacted to consider continuous infusion.   4.  Analgesic options for mechanically ventilated patients include:  a. Fentanyl which is considered the drug of choice for patients requiring continuous infusion. b.Morphine may be considered for those patients without renal dysfunction who are hemodynamically stable and require intermittent pain medication. Continuous infusions of morphine may be used for patientl who are receiving comfort care as part of end of life care. c.Hydromorphone is reserved for patients who are refractory to fentanyl or morphine and is typically admininstered by intermittent dosing. 4.  Agitation/Anxiety    Background  Agitation and anxiety frequently occur in ICU patients. Anxiolytic/sedation agents may be indicated to help relieve discomfort, improve synchrony with mechanical ventilation, and decrease the overall work of breathing. Pain control alone may be sufficient to make patients comfortable enough to require no anxiolytic/sedative agent. In addition, non-pharmacologic interventions such as repositioning or verbal assurance may be helpful to comfort or redirect an agitated patient. If these methods are unsuccessful, then anxiolytic/sedative medications such as propofol, dexmedetomidine, or benzodiazepines can be used. Selection of an anxiolytic should be based on the pharmacokinetic properties of the medication, patient specific characteristics, and sedation goal.   However, nonbenzodiazepine sedatives (ie propofol or dexmedetomidine) may be preferable over benzodiazepines (ie midazolam or larazepam) due to more favorable outcomes such as delirum. Causes and Treatment of Agitation/Anxiety  a. Possible underlying causes of agitation and anxiety include pain, delirium, hypoxemia, hypoglycemia, hypotension, or withdrawal from alcohol  and other drugs. b. Analgesia first sedation should be attempted initially to manage pain and provide sedation in appropriate patients. Analgesia alone may be adequate to reach RASS goal of 0 to -1.   If patient remains agitated or anxious despite adequate analgesia (ie RASS +2 to +4) then anxiolytic/sedative should be considered. c. The choice of anxiolytic should be based on desired levels of sedation (ie light sedation or deep sedation) with preference for the use of nonbenzodiazepines such as propofol or dexmedetomidine if appropriate. While light sedation (ie RASS 0 to -1) is preferred for most patients, there are instances when deep sedation (ie RASS -4 to -5) is desired. For example, in the setting of ventilator dysynchrony due to ARDS or for patients receiving NMB agents. d. Medications to maintain light sedation (ie RASS 0 to -1) include  1. Propofol continuous infusion can be considered for hemodynamically stable (ie SBP = 100, MAP = 65 and/or not requiring vasopressor support) patients requiring light sedation. Propofol has a quick onset (1-2 minutes) and offset of action, making it a good agent to assess neurological status and facilitate liberation from the mechanical ventilator. 2.Dexmedetomidine continuous infusion is a good option for hemodynamically stable patients requiring light sedation as it allows for a more awake, interactive patient is associated with less delirium. It has an intermediate onset of action (5-10 min). Therefore, abrupt titrations should be avoided, but use of prn haloperidol or benzodiazepine may be useful to manage agitation until the medication takes effect. 3. Antipsychotics are another option. In particular, haloperidol intermittently dosed may be useful for patients with symptoms of agitation/anxiety and delirium. 4.Benzodiazapines can also be considered for light sedation, but should be intermittently doses. Midazolam is an option for patients without renal dysfunction. It has a short onset of action (2-5 minutes) making it a good agent for acute agitation/anxiety, but short duration of action resulting in frequent dosing. Lorazepam is another option.   It has a longer onset of action (15-20 minutes) in comparison to midazolam, but longer duration of action. e. Medications to maintain deep sedation (RASS -4 to -5) include:  1) Propofol continuous infusion should be considered as a first line option for hemodynamically stable patients. 2)Benzodiazepines can be considered as second line options for deep sedation. Studies comparing these agents to other sedatives have shown that they lead to worse outcomes including delirium, oversedation, delayed extubation, and longer time to discharge. Midazolam is one option for patients without renal dysfunction and lorazepam is another options. If patient requires more than 3 doses within 1 hour then contact provider  to consider initiation of continuous infusion. 5.  Daily Sedation Awakening Trial (SAT) from IV Continuous Analgesia/Sedation  a. Patients are to have daily awakening from sedation while on continuous IV analgesia and/or sedation in the ICU. Continuous analgesia infusions may be maintained only if needed for active pain and RASS is at goal 0 - -1. Unit guideline is for the SAT to occur following ICP rounds each morning.    b. The sedation awakening trial (SAT) is done regardless if the patient meets criteria for spontaneous breathing trial (SBT). c. SAT safety screen is assessed and SAT should not be performed if sedation is being used for active seizures, alcohol withdrawal, hemodynamically unstable or requiring support of vasoactive medications , in conjunction with NMB agents, if ICP is greater than  20mmHg or if sedation is being used to control ICP, patients RASS is +3 or +4 (very agitated or combative). Other exclusion criteria are:  if there is documentation of myocardial ischemia in the past 24 hours; or patient is receiving high frequency oscillator ventilation (HFOV) , if the patient has an open chest /abdomen or is receiving comfort care.   d. Criteria for passing the SAT are the patient opened their eyes to verbal stimuli or tolerated sedative interruption without exhibiting failure criteria.  e. Patients fail the SAT if the develop sustained anxiety, agitation, or pain; a respiratory rate of 35 per minutes for 5 minutes or longer, an SpO2 less than 88% for 5 minutes or longer; an acute cardiac dysrhythmia; two or more signs of respiratory distress including tachycardia, bradycardia; use of accessory muscles; diaphoresis; marked dyspnea; or myocardial ischemia. f. Respiratory therapy staff must verify with the nurse that continuous IV analgesia (unless being use  for active pain) and sedation (unless patient is receiving dexmedetomidine) is off prior to placing patient on SBT. g. DO NOT interrupt infusion of analgesia and sedation medications if patient is receiving neuromuscular blockade.  h. Monitor level of wakefulness unless patient is awake and follows commands (RASS 0 to -1) or patient becomes uncomfortable or agitated (RASS +3 to +4)  i. If agitation prevents successful awakening , administer bolus of analgesia and/or sedation then resume infusion of the medication at ½ previous dose and titrate as needed.  j. If oversedation prevents successful awakening, hole infusion until at goal and resume ½ of prior infusion rate/dose if clinically indicated. 6. Delirium  Background  Delirium is characterized by the acute onset of cerebral dysfunction with a change or fluctuation baseline  mental status, inattention, and either disorganized thinking or an altered level of consciousness. It affects up to 80% of mechanically ventilated adult ICU patients, and is associated with increased mortality,   and treatment is important and may in turn allow for a patient to be conscious yet cooperative enough to participate   in ventilator weaning trials and early mobilization efforts.     Delirium can only be assessed in patients who are able to sufficiently interact and communicate with bedside  clinicians (ie RASS -3 to +4). IV. Procedure:  A. Assessment: The following criteria must be assessed prior to the initiation of weaning from mechanical ventilation. Note: The criteria are general guidelines and must be individualized for each patient. The patients primary nurse will be responsible, in coordination with the RT, the Spontaneous Awakening Trial). The RT will perform the SBT. B. Spontaneous Awakening Trials (SATs  also referred to as Sedation Vacation) and Spontaneous Breathing Trials (SBTs) performed to determine readiness to wean. 1. For patients who meet established criteria, such as those without active seizures, alcohol withdrawal and agitation, myocardial ischemia or those requiring cardiac support devices, without increased intracranial pressure and those not receiving neuromuscular blockade, the nurse will reduce the infusions of sedative by 50% of current used for sedation that was used to achieve a level of light sedation (Hogue Score 2 or RASS score of 0 to -1) and evaluate patient response to reduction of sedation. Analgesics required for pain control are continued during the test.  Obtain MD order to cover no SAT for that time period if patient has any exclusion criteria as described above. 2. Failure of the spontaneous awakening trial occurs when the patient shows symptoms such as increased agitation, anxiety, pain or signs of respiratory distress including respiratory rate >35/min or oxygen saturation <88% as well as development of acute cardiac arrhythmias. If these symptoms develop during the SAT, the nurse then restarts sedation at 75% of the previous dose and titrates the medications until the patient is comfortable and/or symptoms have abated. 3.  If the patient passes the SAT then the patient moves on to the Spontaneous Breathing Trials as performed by the RT.    The SBT Safety Screen included the following:  No agitation, oxygen saturation > 88%, FIO2 < 50%, PEEP < 7.5 cm H20, no myocardial ischemia, no vasopressor use, and with inspiratory efforts. 4. Patients who pass the spontaneous awakening trial but fail the spontaneous breathing trial are placed back on full ventilator support and reassessed the next day. 5. Failure of the SBT (spontaneous breathing trail) includes any of the following:  Respiratory rate > 35/min, respiratory rate < 8/min, oxygen saturation < 88%, respiratory distress, mental status change, acute cardiac arrhythmia. 6. Extubation is considered for patients who tolerate the spontaneous awakening trial and pass the spontaneous breathing trial.    C. Can the cause of respiratory failure be reversed (i.e. absence of high spinal cord injury or advanced ALS)? D. Is gas exchange adequate? 1. PaO2/FIO2 ratio > 150  200,  2. PEEP < 8 cm H20  3. FIO2 < 50  4. pH > 7.30  5. Rapid shallow breathing index (f/VT) < 105  E. Is patient hemodynamically stable? 1. Absence of clinically significant hypotension (minimal vasopressors such as Dopamine < 5mcg/kg/minute)? F. Is there evidence of intact respiratory drive (NIP/NIF >-00 ACL67, stable VC02)? G. Does patient have an adequate cough, airway clearance ability? H. Is there absence of excessive secretions? V. Initiation:     A. The therapist shall consult with RN to determine if sedation can be discontinued or significantly decreased. If this can be achieved, the therapist shall implement the                     followin. Identify patient and verify name and account number via ID [email or web address]. 2. Perform hand hygiene per hospital policy utilizing Standard Precautions for all patients and following transmission-based isolation as indicated per hospital policy. 3. Perform a ONE-MINUTE SPONTANEOUS TRIAL AND ASSESSMENT. 4. Measure Rapid Shallow Breathing Index (RSBI) and monitor SpO2 and cardiovascular parameters during the spontaneous breathing assessment.   5. If SpO2 and cardiovascular parameters are stable, continue spontaneous breathing trial for at least 30 minutes and up to 120 minutes, as patient tolerates. 6. Monitor ventilatory status, SpO2, and cardiovascular status during spontaneous breathing trial.  7. If patient has a successful trial, consider patient as a candidate for extubation and obtain order. 8. If patient fails the weaning trial, place back on ventilator and adjust settings to provide a non-fatiguing form of ventilatory support for the remainder of the day and night. 9. One attempt at weaning shall be performed each day until successful weaning occurs. The RCP will make every attempt to begin the spontaneous breathing trials between 0500 and 0600 to provide documentation of the trial when the pulmonologist makes rounds. B.  Assessment of SBT or PST:  1. Is gas exchange acceptable? 2. PaO2 > 60 mm Hg. 3. PH > 7.30  4. Increase in PaCO2  < 10 mm Hg  C. Is patient hemodynamically stable? 1. HR < 120 beats/minute  2. HR  < 20%   3. Systolic BP < 350 and > 90 mmHg  4. BP  < 20%, no vasopressors required  D. Does patient have stable ventilatory pattern? 1. Sustained RR < 30 breaths per minute  2. Normal and stable VCO2  3. Patient is not demonstrating any signs of increased work of breathing, such as increased use of accessory muscles. E. Mental status stable throughout trial?  1. Absence of changes such as somnolence, excessive agitation or anxiety  2. Absence of diaphoresis during trial?  IV. Safety:    A. The RCP shall monitor patient according to the above guidelines. If at any time during the weaning process, the respiratory therapist or nurse feels that the patient is not tolerating weaning, the therapist shall place patient back on previous ventilator settings. B. The patient shall be reassessed and the weaning process should be continued the following day. V. Reportable Conditions:    A.  The therapist shall notify the physician, as appropriate, for any of the following conditions:  1. FIO2 increase (sustained) at 10% or greater  2. Poor patient/ventilator interface in spite of adjustments  3. Need for increased sedation for respiratory distress  4. Need for increasing ventilating pressures (i.e. PEEP, PIP, MAP)  5. ABG results meeting panic value criteria or other clinical signs indicating deterioration of patients condition. 6. Unplanned extubation. 7. Unexplained sustained increase in PIP greater than 10 cm H2O.  8. Assessment results regarding ventilator discontinuance process. VI. Ventilator protocol management   A. The following items should be maintained for patients who are being mechanically           ventilated. 1. Obtain STAT Chest X-Ray for ET tube placement after insertion. 2. Chest X-Ray q a.m. while on ventilator. 3. ABGs 30 - 60 minutes after being stable on the ventilator. 4. ABG's q a.m. while on ventilator and prn.  5. Do spontaneous breathing trials when patient is hemodynamically stable, responsive, and without fever. 6. Terminate trials if patient exhibits signs of respiratory distress. 7. Therapists should maintain ABG s as follows:      a. pH -  7.30 - 7.50                   b. PaO2 -   60  100        8. Racemic Epinephrine (0.5cc) for post extubation stridor (2 UA treatments max.)    VII.   Early Mobilization    Mobility Level Criteria Start at Level 1 if:   PaO2/FIO2 <250   Positive end-expiratory Pressure (PEEP) >=10 cm H2O   O2 saturation <90%   Respiratory Rate (RR) Not within 10-30 per min   Cardiac arrhythmias or ischemia New onset   Heart Rate  (HR) <60 or >120 beats per min   Mean arterial pressure (MAP) <55 or >086 mmHg   Systolic blood pressure (SBP) <90 or > 180 mmHg   Vasopressor infusion New or increasing   Wray Agitation Scale (RASS) < - 3       Level I:   Breathe  (Rass -5 to -3)  HOB Angle  improve VAP protocol compliance   Visually confirm the [facility] is elevated >= 30 degrees to comply with VAP prevention protocols   The Centers for Disease Control and Prevention recommends an HOB angle of 30-45 degrees , unless contraindicated  Additional activities to be implemented   Every 2 hour turning   Passive range of motion   Up to 20 degrees reverse trendelenburg with lower extremity exercise/retracting footboard   Continuous lateral rotation therapy can be considered part of early mobility therapy in patients who are at high risk for pulmonary complications  Move to Level 2 when the patient  - Has acceptable oxygenation/hemodynamics  - Tolerates q 2 turning  - Tolerates HOB > 30 degrees or up to 20 degrees reverse trendelenburg    Level 2 :Tilt  Patient Assessment Rass > -3  (eg, opens eyes, may have profound weakness)  Up to 20 degrees Reverse Trendelenburg position and 10 degrees minimum HOB  - Reverse Trendelenburg positioning allows for orthostatic position in fragile patients  - If available , use in conjunction with retracting foot section to allow for partial weight bearing prior to sitting up in the bed or getting out of bed  Additional activities to be implemented  -  Maintain HOB >/= 30 degrees  - Q 2 hour turning  - Passive/active range of motion  - Legs dependent  - PT consultation       Move to Level 3 when the patient . .    -Tolerates active- assistance exercises 2 times per day    -Tolerates lower extremity exercises against footboard/Up to 20 degrees Reverse Trendelenburg    -Tolerates legs dependent /HOB 45 degrees  Level 3 :  SIT  (Rass >- 1 (eg , weak but may move arms/legs independently)   Full chair position (footboard on)   Full upright positioning allows for diaphragmatic excursion and lung expansion   Sitting with legs in a dependent position facilitates gas exchange  Additional activities to be implemented  - Maintain HOB >= 30 degrees  - Q 2 hour turning (assisted)  - Active range of motion  PT/OT actively involved  - Encourage activities of daily living  - Dangling, if patient can move arm against gravity  Move to Level 4 when the patient . .  - Tolerates increasing active exercise in bed  - Actively assists with every- 2- hour turning or turns independently  - Tolerates full chair position 3 times/day  Level 4:  Stand ( RASS >0 (eg, weak but may tolerate increased activity)      Stand Attempts   Full chair position (footboard off/feet on the floor)   Consider using a sit-to-stand lift   Pivot to chair, it tolerates partial weight bearing  Additional activities to be implemented  - Maintain head of bed >= 30 degrees  - Q 2 hr turning (self/assisted)  - Active range of motion  - Encourage activities of daily living  - PT/OT actively involved      Move to Level 5 when the patient .  - Can successfully comply with all activities  - Tolerates trial periods of full chair position (footboard off/feet on floor) 3 times per day  - Tolerates partial weight-bearing stand and pivots to chair    Level 5 :  Move  (RASS > 0    (eg, weak but may tolerate increased activity)   Achieve out of bed   Utilize mobile floor life to ambulate to bedside chair  Additional activities to be implemented  - Maintain HOB > = 30 degrees  - Q 2 hour turning (self/assisted)  - Active range of motion  - Patient stands/bears weight > 1 minute  - Patient marches in place  - PT/OT actively involved    Patient continues to ambulate progressively longer distances as tolerated until they consistently participate and move independently.         E Approved by [address] 2-19-09   N [facility] Clinical Guidelines             GYPSY GARCIA [facility] Flowsheet Content Variables to select when addressing section Comments   GYPSY Initiated  Yes/No  RN to address minimum q 24 hours (day shift)   Target RASS  0 = alert and oriented   -1 = drowsy   -2 = light sedation   -3= moderate sedation   -4= deep sedation Target on standard ventilator setting should be -2; -4 with oscillator   CAM -ICU  Positive   Negative   Unable to assess Delirium assessment   SAT Safety Screen Passed  Yes   No Select yes if proceeding on to the sedation vacation (reduction of continuous sedative drip by directed by MD)  Select no if your patient has any of the below reasons for not proceeding on to the daily awakening sedation vacation trial   SAT Screen for Failure  Active seizures   Acute delirium tremors   Agitation that threatens accidental line/tube removal   On paralytics   MI (24-48hr)   Abnormal ICP   Open abdomen Select one of the options when the patient will not undergo the sedation vacation  ALSO MUST OBTAIN AN ORDER FOR no [address] written under nursing miscellaneous for now by either the NP or Intensivist   Daily sedation Vacation/assessment of   Yes   No   Not applicable If yes, MUST see the reduction in sedation on the [facility] and please place in the comment section of the sedative sedation vacation started   SBT Safety Screen Passed  Yes   No Select Yes if the patient has none of the below listed reasons for not proceeding on to the SBT following reduction of sedation   SBT Screen Reason for Failure  Agitation   O2 Sat < or = 88%   FIO2 > 50%   PEEP >7   MI   Vasopressor Use   Bilevel setting on Vent   Oscillator in use   Increased resp effort Select reason as appropriate for NOT proceeding on to the SBT                                               Wake Up and Breathe Protocol

## 2018-12-14 NOTE — PROGRESS NOTES
TRANSFER - IN REPORT:    Verbal report received from Deanne Chester CRNA(name) on [employer]  being received from [facility](unit) for routine post - op      Report consisted of patients Situation, Background, Assessment and   Recommendations(SBAR). Information from the following report(s) SBAR, Kardex, OR Summary, Procedure Summary, Intake/Output, MAR, Recent Results, Med Rec Status and Cardiac Rhythm paced was reviewed with the receiving nurse. Opportunity for questions and clarification was provided. Assessment completed upon patients arrival to unit and care assumed.

## 2018-12-14 NOTE — ANESTHESIA PREPROCEDURE EVALUATION
Anesthetic History   No history of anesthetic complications            Review of Systems / Medical History  Patient summary reviewed and pertinent labs reviewed    Pulmonary  Within defined limits                 Neuro/Psych   Within defined limits           Cardiovascular        Angina: with exertion      CAD    Exercise tolerance: <4 METS: SOB with walking and steps  Comments: Echo (12-3-2018) - EF 30-35%    Cath - moderate/severe LV dysfunction, multiple vessel, EF 40-45%  LBBB  NOW S/P CABG   GI/Hepatic/Renal     GERD: well controlled           Endo/Other             Other Findings        Anesthetic History   No history of anesthetic complications            Review of Systems / Medical History  Patient summary reviewed and pertinent labs reviewed    Pulmonary  Within defined limits                 Neuro/Psych   Within defined limits           Cardiovascular        Angina: with exertion      CAD    Exercise tolerance: <4 METS: SOB with walking and steps  Comments: Echo (12-3-2018) - EF 30-35%    Cath - moderate/severe LV dysfunction, multiple vessel, EF 40-45%    LBBB   GI/Hepatic/Renal     GERD: well controlled           Endo/Other             Other Findings            Physical Exam    Airway  Mallampati: II  TM Distance: 4 - 6 cm  Neck ROM: normal range of motion   Mouth opening: Normal     Cardiovascular               Dental    Dentition: Full upper dentures and Caps/crowns     Pulmonary                 Abdominal         Other Findings            Anesthetic Plan    ASA: 4  Anesthesia type: general    Monitoring Plan: Arterial line, BIS, CVP, [place] and DUC      Induction: Intravenous  Anesthetic plan and risks discussed with: Patient and Spouse      Given PFO IV lines were meticulously deaired        Physical Exam    Airway  Mallampati: II  TM Distance: 4 - 6 cm  Neck ROM: normal range of motion   Mouth opening: Normal     Cardiovascular               Dental    Dentition: Full upper dentures and Caps/crowns     Pulmonary                 Abdominal         Other Findings            Anesthetic Plan    ASA: 4  Anesthesia type: general    Monitoring Plan: Arterial line      Induction: Intravenous  Anesthetic plan and risks discussed with: Patient and Spouse      Given PFO IV lines were meticulously deaired    Multiple episodes of asystole during CABG. Sternal Pacing wires in place.  Mrs. Patric Kirkland remains intubated, sedated, and hemodynamically stable on minimal support

## 2018-12-14 NOTE — PROCEDURES
Pre-Procedure Diagnosis  1. Systolic heart failure, ejection fraction of 30%. 2. Combined ischemic and nonischemic dilated cardiomyopathy. 3. Class II heart failure symptoms. 4. Initiation of optimal medical therapy. 5. Primary prevention indications for defibrillator  6. Life expectancy greater than 1 year. 7. Symptomatic bradycardia secondary to third degree AV block, Complete heart block    Procedure Performed  1. Insertion of St. Rolando biventricular implantable cardioverter defibrillator. 2. Insertion of left ventricular lead at time of new system Implantation. Anesthesia: MAC     Estimated Blood Loss: Less than 10 mL     Specimens: * No specimens in log *     The procedure, indications, risks, benefits, and alternatives were discussed with the patient and family members, who desired to proceed after questions were answered and informed consent was documented. Procedure: After informed consent was obtained, the patient was brought to the Electrophysiology Laboratory in a fasting state and was prepped and draped in sterile fashion. Prophylactic antibiotic was administered 10 minutes prior to skin incision (refer to anesthesia procedural documentation for details). MAC was administered by the anesthesia team with continuous oxygen saturation measurement and blood pressure measurement. Local anesthetic (sensorcaine) was delivered to the left pectoral region and an incision was made parallel to the deltopectoral groove. A subcutaneous pocket was created using blunt dissection and electrocautery, and adequate hemostasis was established. Access x 3 was obtained under ultrasound guidance using a modified Seldinger technique with placement of 2 short wires and a long wire down into the RA and advanced below the diaphragm. Next, placement of a peel-away sheath over the first guidewire was performed.   A permanent RV single coil ICD lead was then advanced under fluoroscopic guidance into the RV apex in a stable position with satisfactory sensing and pacing characteristics. The peel away sheath was removed. Another Safe-sheath was then placed over the second guidewire. A permanent pacing lead was advanced under fluoroscopic guidance and positioned in the right atrium at the location of the RAA. Stable RA appendage position with satisfactory sensing and pacing characteristics was obtained. The sheath was peeled. The leads were sutured to the underlying pectoralis fascia using 2 non-absorbable sutures around each lead's collar. The lead slack and position was assessed under fluoroscopy while securing the lead collars to ensure proper length. After positioning the RA and RV leads, a standard Merit Esperanza LV delivery sheath was advanced over the long access wire and dilator and wire were removed. The braided inner sheath was then advanced into the [id number] and used to cannulate the coronary sinus using contrast when necessary. A Glide wire and was used to allow a smooth transition into the CS body. A coronary sinus venogram was then performed using a balloon occluding catheter. A Merit renal inner sheath was used with an Acuity Straight trak to cannulate a posterolateral branch. The renal was advanced into the posterolateral and a subselected venogram was performed. The left ventricular lead was then advanced into a posterolateral brach over an angioplasty wire. Adequate thresholds were obtained with an adequate margin between phrenic stim and loss of capture. The delivery sheaths were then slit with fluoroscopy demonstrating stable position. The lead was then sutured to the underlying pectoralis fascia using 2 non-absorbable sutures around the lead collar. Final lead testing via the pacing system analyzer (PSA) demonstrated stable sensing, impedance and pacing thresholds. The lead pins were then cleaned with antibiotic soaked gauze, dried gently, and attached to a new defibrillator generator.  Pins were directly observed to pass the tip electrode, and the ring hex wrench screws were secured, and leads tug tested. The pocket was irrigated copiously with a saline antimicrobial solution. The device and leads were gently positioned within the pocket. The wound was closed with multiple layers of absorbable suture followed by skin closure with staples. Fluoroscopic images were interpreted by me, in multiple projections. Final device position was confirmed by stored fluoroscopic image from device pocket to lead tips in AP projection. The patient tolerated the procedure well and left the lab in good condition. Lead Data:    Pulse Generator Model #  Serial # Location Implant   [id number] [facility] [id number] Left Pectoral Implant     Lead Model Number  Serial Number Lead position Implant   RA [id number] [facility] [id number] RA [place]end[place] Implant     RV [id number] [facility] [id number] RV [place] Implant     LV [id number] [facility] [id number] LCV Implant     Lead Sensitivity and Threshold  Lead R or P sensitivity (mv) Threshold (V) Threshold PW (msec) Impedance (ohms) Final output Voltage (V) Final PW (msec)   RA 1.3 0.75 0.30 350 2.5 0.30   RV >12.0 0.75 0.30 480 2.5 0.30   LV - 1.50 0.50 630 2.5 0.50     Bradycardia Settings  Curtis Mode LRL URL Pace AVD (ms) Sense AVD (ms) Rate Response Mode Switching Mode SW Rate   DDD 60 130 200 150 On On 180       Tachycardia Settings  Zone Type VT-1 VT-2 VF   ON/OFF/  MONITOR ON OFF ON   Zone Rate 171  100 Intervals  222   1st Therapy Type ATP-burst x3  Shock   Energy (J) 85%  30   2nd Therapy Type Shock  Shock   Energy (J) 30  40   3rd Therapy Type Shock  Shock   Energy (J) 40  40   4th Therapy Type Shock  Shock   Energy (J) 40  40   5th Therapy Type Shock  Shock   Energy (J) 40  40   6th Therapy Type   Shock   Energy (J)   40     Defibrillation Threshold Testing  DFT# How induced Successful test? Shock Imped (ohms) Energy (J) Charge time (sec) Rescue needed?  Defib threshold (J)               Contrast: 30 ml    Fluoro Time:   8.2 minutes    Complications: None    IMPRESSION: Successful implantation of St. Rolando biventricular implantable cardioverter defibrillator for primary prevention of sudden death in the setting of complete heart block     Heath Fontenot MD, MS  Clinical Cardiac Electrophysiology  12/14/2018  6:06 PM

## 2018-12-14 NOTE — ANESTHESIA PROCEDURE NOTES
Arterial Line Placement    Start time: 12/14/2018 7:09 AM  End time: 12/14/2018 7:12 AM  Performed by: Jc Padron CRNA  Authorized by: Eric Stallworth MD     Pre-Procedure  Indications:  Arterial pressure monitoring and blood sampling  Preanesthetic Checklist: patient identified, risks and benefits discussed, anesthesia consent, site marked, patient being monitored, timeout performed and patient being monitored    Timeout Time: 07:09        Procedure:   Prep:  ChloraPrep  Seldinger Technique?: Yes    Location:  Radial artery  Catheter size:  20 G  Number of attempts:  1  Cont Cardiac Output Sensor: No      Assessment:   Post-procedure:  Line secured and sterile dressing applied  Patient Tolerance:  Patient tolerated the procedure well with no immediate complications  Comment:   Left arm prepped with ChloraPrep, 0.8ml of 1% lidocaine infiltrated at skin, Seldinger technique, good blood return, good waveform.

## 2018-12-14 NOTE — ANESTHESIA PROCEDURE NOTES
DUC  Date/Time: 12/14/2018 11:43 AM  Ordering Provider: Froy Haley MD    Procedure Details: probe placement, image aquisition & interpretation    Site marked, Timeout performed, 08:00  Risks and benefits discussed with the patient and plans are to proceed    Procedure Note    Performed by: Millie Lugo MD  Authorized by: Millie Lugo MD       Indications: assessment of surgical repair  Modalities: 2D, CF  Probe Type: biplane  Insertion: atraumatic    Echocardiographic and Doppler Measurements   Aorta  Size  Diam(cm)  Dissection PlaqueThick(mm)  Plaque Mobile    Ascending normal  No 0-3 No    Arch         Descending normal   0-3 No          Valves  Annulus  Stenosis  Area/Grad  Regurg  Leaflet   Morph  Leaflet   Motion    Aortic normal none  0 normal normal    Mitral normal none  1+ normal normal    Tricuspid                Atria  Size  SEC (smoke)  Thrombus  Tumor  Device    Rt Atrium normal No No No Yes    Lt Atrium normal No No No No     Interatrial Septum Morphology: patent foramen ovale    Interventricular Septum Morphology: normal    Ventricle  Cavity Size  Cavity Dimension Hypertrophy  Thrombus  Gloal FXN  EF    RV          LV normal   No moderately impaired        Regional Function  (1 = normal, 2 = mildly hypokinetic, 3 = severely hypokinetic, 4 = akinetic, 5 = dyskinetic) LAV - Long [place]   ME LAV = 0  ME LAV = 90  ME LAV = 130   Basal Sept:3 Basal Ant:1 Basal Post:3   Mid Sept:2 Mid Ant:1 Mid Post:1   Apical Sept:1 Apical Ant:1 Basal Ant Sept:2   Basal Lat:1 Basal Inf:2 Mid Ant Sept:1   Mid Lat:1 Mid Inf:2    Apical Lat:1 Apical Inf:1        Pericardium: normal    Post Intervention Follow-up Study  Ventricular Global Function: unchanged  Ventricular Regional Function: unchanged     Valve  Function  Regurgitation  Area    Aortic no change      Mitral no change 1+     Tricuspid       Prosthetic          Comments: Basic DUC performed for hemodynamic monitoring    Placed and removed without complication

## 2018-12-14 NOTE — ANESTHESIA PREPROCEDURE EVALUATION
Anesthetic History   No history of anesthetic complications            Review of Systems / Medical History  Patient summary reviewed and pertinent labs reviewed    Pulmonary  Within defined limits                 Neuro/Psych   Within defined limits           Cardiovascular        Angina: with exertion      CAD    Exercise tolerance: <4 METS: SOB with walking and steps  Comments: Echo (12-3-2018) - EF 30-35%    Cath - moderate/severe LV dysfunction, multiple vessel, EF 40-45%  NOW S/P CABG  LBBB   GI/Hepatic/Renal     GERD: well controlled           Endo/Other             Other Findings              Physical Exam    Airway  Mallampati: II  TM Distance: 4 - 6 cm  Neck ROM: normal range of motion   Mouth opening: Normal     Cardiovascular               Dental    Dentition: Full upper dentures and Caps/crowns     Pulmonary                 Abdominal         Other Findings            Anesthetic Plan    ASA: 4  Anesthesia type: general    Monitoring Plan: Arterial line    Post procedure ventilation   Induction: Intravenous  Anesthetic plan and risks discussed with: Patient and Spouse      Given PFO IV lines were meticulously deaired. Pt remains intubated and sedated. Hemodynamically stable. Numerous episodes of asystole during CABG today.

## 2018-12-14 NOTE — PROGRESS NOTES
Ventilator check complete; patient has a #7. 0 ET tube secured at the 23 at the lip. Patient is sedated. Patient is not able to follow commands. Breath sounds are coarse. Trachea is midline, Negative for subcutaneous air, and chest excursion is symmetric. Patient is also Negative for cyanosis. All alarms are set and audible. Resuscitation bag is at the head of the bed. Ventilator Settings  Mode FIO2 Rate Tidal Volume Pressure PEEP I:E Ratio   SIMV, Pressure support, PRVC  60 %   16 500 ml  12 cm H2O  8 cm H20         Peak airway pressure: 21 cm H2O   Minute ventilation: 8 l/min     ABG: No results for input(s): PH, PCO2, PO2, HCO3 in the last 72 hours.       Kanu Wallace, RT

## 2018-12-14 NOTE — ANESTHESIA POSTPROCEDURE EVALUATION
Procedure(s):  CORONARY ARTERY BYPASS GRAFT X  3 , LIMA  VEIN HARVEST GREATER SAPHENOUS  ESOPHAGEAL TRANS ECHOCARDIOGRAM.    Anesthesia Post Evaluation        Patient location during evaluation: ICU  Patient participation: complete - patient cannot participate  Post-procedure mental status: Sedated. Pain management: adequate  Airway patency: patent  Anesthetic complications: no  Cardiovascular status: hemodynamically stable  Respiratory status: intubated  Hydration status: stable  Comments: Pt transported to ICU intubated and sedated.  VSS throughout transport and on arrival. Full report given to ICU staff at bedside        Visit Vitals  /54   Pulse 77   Temp 37.1 °C (98.8 °F)   Resp 15   Ht 5' 6\" (1.676 m)   Wt 62.6 kg (138 lb 1 oz)   SpO2 100%   BMI 22.28 kg/m²

## 2018-12-14 NOTE — BRIEF OP NOTE
BRIEF OPERATIVE NOTE    Date of Procedure: 12/14/2018   Preoperative Diagnosis: Atherosclerosis of native coronary artery of native heart with unstable angina pectoris [facility] [I25.110]  Postoperative Diagnosis: Atherosclerosis of native coronary artery of native heart with unstable angina pectoris ([facility] [I25.110]    Procedure(s):  CORONARY ARTERY BYPASS GRAFT X  3 , LIMA to the LAD, sequential SVG to ramus and to OM  VEIN HARVEST GREATER SAPHENOUS  ESOPHAGEAL TRANS ECHOCARDIOGRAM  Surgeon(s) and Role:     * Yun Davis MD - Primary         Surgical Assistant:      Surgical Staff:  Circ-1: Ayden Raymond RN  Circ-Relief: Liberty Suarez RN  Perfusionist: Shavon Quiles Tech-1: Montserrat Erwin Tech-2: Gautam Staple  Event Time In Time Out   Incision Start 4014    Incision Close 9148      Anesthesia: General   Estimated Blood Loss: minimal  Specimens: * No specimens in log *   Findings:     Complications:    Implants: * No implants in log *

## 2018-12-14 NOTE — ANESTHESIA PROCEDURE NOTES
Central Line Placement    Start time: 12/14/2018 7:21 AM  End time: 12/14/2018 7:35 AM  Performed by: Tino Galloway MD  Authorized by: Tino Galloway MD     Indications: vascular access, central pressure monitoring and need for vasopressors  Preanesthetic Checklist: patient identified, risks and benefits discussed, anesthesia consent, site marked, patient being monitored and timeout performed    Timeout Time: 07:21       Pre-procedure: All elements of maximal sterile barrier technique followed?  Yes    2% Chlorhexidine for cutaneous antisepsis, Hand hygiene performed prior to catheter insertion and Ultrasound guidance    Sterile Ultrasound Technique followed?: Yes            Procedure:   Prep:  Chlorhexidine and ChloraPrep  Location:  Internal jugular  Orientation:  Right  Patient position:  Trendelenburg  Catheter type:  Double lumen  Catheter size:  9 Fr    Number of attempts:  1  Successful placement: Yes      Assessment:   Post-procedure:  Catheter secured, sterile dressing applied and sterile dressing with CHG applied  Assessment:  Placement verified by x-ray, free fluid flow, blood return through all ports, other (comment) and guidewire removal verified  Insertion:  Uncomplicated  Patient tolerance:  Patient tolerated the procedure well with no immediate complications  Randolph Formkenneth Catheter floated without complication and secured at: 43    Real time US guidance used for placement and image saved to chart

## 2018-12-14 NOTE — CONSULTS
[facility] Cardiology Consultation        Date of  Admission: 2018  5:04 AM     Primary Care Physician: Dr. Maicol Fink  Primary Cardiologist: Dr. Rosario Cochran  Referring Physician: Dr. Fuentes Aguirre  Consulting Physician: Dr. Gena Hodges    CC/Reason for consult: CHB    HPI:  Ayad Maldonado is a 70 y.o. female who was recently seen by Dr. Rosario Cochran with family h/o CAD and c/o worsening ARRIAGA. She was prescribed a statin but had not been taking. She didn't have any adverse side effects she just chose not to take it. She had a LBBB. She underwent nuclear stress test that showed a reduced EF of 35% and a large fixed bryan-septal and apical defect. LHC was planned. She underwent cardiac catheterization which showed severe multivessel disease and she was referred for CABG. Pt came in for CABG this AM. She had a reported asystolic episode just prior to surgery and again during surgery. She is paced now with underlying CHB and [facility] was consulted. Pt had echo showing EF 30-35%. BiV ICD was recommended. Pt complete heart block is a new problem, no aggravating or alleviating factors, uncontrolled requiring epicardial pacing.         Past Medical History:   Diagnosis Date    Abnormal stress echo 2015    Anxiety     Arrhythmia     BCC (basal cell carcinoma of skin)     CAD Nonobstructive 10-20% LAD on CTA Coronary 2015    Chronic insomnia     Dyslipidemia     Fibromyalgia     GERD (gastroesophageal reflux disease)     IBS (irritable bowel syndrome)     Ischemic colitis ([facility] [address] 2014    Osteopenia     Overactive bladder     Psychiatric disorder     SCCA (squamous cell carcinoma) of skin       Past Surgical History:   Procedure Laterality Date    HX BREAST AUGMENTATION      HX BREAST BIOPSY Left     HX  SECTION      HX COLONOSCOPY  2015    WNL    HX ADRIANE AND BSO      HX TONSILLECTOMY      IMPLANT BREAST SILICONE/EQ Bilateral 0378       Allergies   Allergen Reactions    Codeine Nausea Only    Cenestin [Synthetic Conj Estrogens A] Palpitations    Doxycycline Nausea Only    Levaquin [Levofloxacin] Nausea and Vomiting    Pcn [Penicillins] Rash    Sulfa Dyne Rash      Social History     Socioeconomic History    Marital status:      Spouse name: Not on file    Number of children: Not on file    Years of education: Not on file    Highest education level: Not on file   Social Needs    Financial resource strain: Not on file    Food insecurity - worry: Not on file    Food insecurity - inability: Not on file   [employer] needs - medical: Not on file   [employer] needs - non-medical: Not on file   Occupational History    Occupation: Undaician/Works also at [email or web address]   Tobacco Use    Smoking status: Former Smoker     Last attempt to quit: 3/1/1984     Years since quittin.8    Smokeless tobacco: Never Used   Substance and Sexual Activity    Alcohol use: Yes     Comment: occ    Drug use: No    Sexual activity: Yes     Partners: Male     Birth control/protection: Surgical   Other Topics Concern    Not on file   Social History Narrative    Not on file     Family History   Problem Relation Age of Onset    Heart Disease Mother         CABG, [place]   Gyula.Decent Arthritis-osteo Mother     Cancer Father         larnyx, non smoker    Breast Cancer Paternal Grandmother 67        Current Facility-Administered Medications   Medication Dose Route Frequency    aspirin delayed-release tablet 81 mg  81 mg Oral QHS    carvedilol (COREG) tablet 3.125 mg  3.125 mg Oral BID WITH MEALS    [START ON 12/15/2018] citalopram (CELEXA) tablet 10 mg  10 mg Oral DAILY    rosuvastatin (CRESTOR) tablet 20 mg  20 mg Oral QHS    0.9% sodium chloride infusion  25 mL/hr IntraVENous CONTINUOUS    dextrose 5% - 0.45% NaCl with KCl 20 mEq/L infusion  25 mL/hr IntraVENous CONTINUOUS    sodium chloride (NS) flush 5-10 mL  5-10 mL IntraVENous Q8H    sodium chloride (NS) flush 5-10 mL 5-10 mL IntraVENous PRN    oxyCODONE-acetaminophen (PERCOCET) 5-325 mg per tablet 1 Tab  1 Tab Oral Q4H PRN    morphine 10 mg/ml injection 3-5 mg  3-5 mg IntraVENous Q1H PRN    naloxone (NARCAN) injection 0.4 mg  0.4 mg IntraVENous PRN    mupirocin (BACTROBAN) 2 % ointment   Both Nostrils BID    ceFAZolin (ANCEF) 2 g/20 mL in sterile water IV syringe  2 g IntraVENous Q8H    sodium bicarbonate 8.4 % (1 mEq/mL) injection 50 mEq  50 mEq IntraVENous PRN    EPINEPHrine (ADRENALIN) 4 mg in 0.9% sodium chloride 250 mL infusion  0.05-0.1 mcg/kg/min IntraVENous TITRATE    nitroglycerin (Tridil) 200 mcg/ml infusion   mcg/min IntraVENous TITRATE    PHENYLephrine (RENZO-SYNEPHRINE) 30 mg in 0.9% sodium chloride 250 mL infusion   mcg/min IntraVENous TITRATE    lidocaine (PF) (XYLOCAINE) 100 mg/5 mL (2 %) injection syringe  mg   mg IntraVENous ONCE PRN    amiodarone (CORDARONE) tablet 200 mg  200 mg Oral Q12H    ondansetron (ZOFRAN) injection 4 mg  4 mg IntraVENous Q4H PRN    insulin regular (NOVOLIN R, HUMULIN R) 100 Units in 0.9% sodium chloride 100 mL infusion  1 Units/hr IntraVENous TITRATE    dextrose (D50W) injection syrg 12.5 g  25 mL IntraVENous PRN    magnesium sulfate 1 g/100 ml IVPB (premix or compounded)  1 g IntraVENous PRN    potassium chloride 10 mEq in 50 ml IVPB  10 mEq IntraVENous PRN    midazolam (VERSED) injection 1 mg  1 mg IntraVENous Q1H PRN    propofol (DIPRIVAN) infusion  0-50 mcg/kg/min IntraVENous TITRATE    chlorhexidine (PERIDEX) 0.12 % mouthwash 10 mL  10 mL Oral BID    tuberculin injection 5 Units  5 Units IntraDERMal ONCE    influenza vaccine 2018-19 (6 mos+)(PF) (FLUARIX QUAD/FLULAVAL QUAD) injection 0.5 mL  0.5 mL IntraMUSCular PRIOR TO DISCHARGE    bacitracin 50,000 Units, neomycin-polymyxin B  2 mL in sterile water irrigation 1,000 mL irrigation   Irrigation ONCE    bupivacaine-EPINEPHrine (PF) (SENSORCAINE PF) 0.5 %-1:200,000 injection 300 mg  60 mL SubCUTAneous ONCE    iopamidol (ISOVUE-370) 76 % injection 50 mL  50 mL IntraVENous RAD ONCE       Review of symptoms:  Unable to obtain due to Pt condition       Subjective:   Physical Exam    Visit Vitals  /55 (BP 1 Location: Left arm, BP Patient Position: At rest)   Pulse 80   Temp 98 °F (36.7 °C)   Resp 12   Ht 5' 6\" (1.676 m)   Wt 62.6 kg (138 lb 1 oz)   SpO2 100%   BMI 22.28 kg/m²     General Appearance:  Well developed, well nourished, sedated but arousable   Ears/Nose/Mouth/Throat:   Hearing grossly normal         Neck: Supple. Chest:   Lungs clear to auscultation bilaterally. Cardiovascular:  Regular rate and paced rhythm, distant S1, S2, +rub   Abdomen:   Soft, non-tender, bowel sounds are active. Extremities: No edema bilaterally. Skin: Warm and dry.   Neuro: no new focal deficits, limited 2/2 sedated status       Labs:   Recent Results (from the past 24 hour(s))   POC CG8I    Collection Time: 12/14/18  7:25 AM   Result Value Ref Range    pH (POC) 7.367 7.35 - 7.45      pCO2 (POC) 48.8 (H) 35 - 45 MMHG    pO2 (POC) 395 (H) 75 - 100 MMHG    HCO3 (POC) 28.0 (H) 22 - 26 MMOL/L    sO2 (POC) 100 (H) 95 - 98 %    Base excess (POC) 2 mmol/L    Sodium,  136 - 145 MMOL/L    Potassium, POC 3.8 3.5 - 5.1 MMOL/L    Glucose,  (H) 65 - 100 MG/DL    Calcium, ionized (POC) 1.26 1.12 - 1.32 mmol/L   POC CG8I    Collection Time: 12/14/18  9:20 AM   Result Value Ref Range    pH (POC) 7.375 7.35 - 7.45      pCO2 (POC) 45.6 (H) 35 - 45 MMHG    pO2 (POC) 278 (H) 75 - 100 MMHG    HCO3 (POC) 26.7 (H) 22 - 26 MMOL/L    sO2 (POC) 100 (H) 95 - 98 %    Base excess (POC) 1 mmol/L    Sodium,  (H) 136 - 145 MMOL/L    Potassium, POC 3.8 3.5 - 5.1 MMOL/L    Glucose,  (H) 65 - 100 MG/DL    Calcium, ionized (POC) 1.23 1.12 - 1.32 mmol/L   POC CG8I    Collection Time: 12/14/18  9:48 AM   Result Value Ref Range    pH (POC) 7.359 7.35 - 7.45      pCO2 (POC) 44.7 35 - 45 MMHG    pO2 (POC) 364 (H) 75 - 100 MMHG    HCO3 (POC) 25.2 22 - 26 MMOL/L    sO2 (POC) 100 (H) 95 - 98 %    Base excess (POC) 0 mmol/L    Sodium,  136 - 145 MMOL/L    Potassium, POC 4.0 3.5 - 5.1 MMOL/L    Glucose,  (H) 65 - 100 MG/DL    Calcium, ionized (POC) 1.09 (L) 1.12 - 1.32 mmol/L   POC CG8I    Collection Time: 12/14/18 10:24 AM   Result Value Ref Range    pH (POC) 7.374 7.35 - 7.45      pCO2 (POC) 39.6 35 - 45 MMHG    pO2 (POC) 220 (H) 75 - 100 MMHG    HCO3 (POC) 23.1 22 - 26 MMOL/L    sO2 (POC) 100 (H) 95 - 98 %    Base deficit (POC) 2 mmol/L    Sodium,  136 - 145 MMOL/L    Potassium, POC 5.0 3.5 - 5.1 MMOL/L    Glucose,  (H) 65 - 100 MG/DL    Calcium, ionized (POC) 1.11 (L) 1.12 - 1.32 mmol/L   POC CG8I    Collection Time: 12/14/18 10:45 AM   Result Value Ref Range    pH (POC) 7.390 7.35 - 7.45      pCO2 (POC) 42.2 35 - 45 MMHG    pO2 (POC) 332 (H) 75 - 100 MMHG    HCO3 (POC) 25.6 22 - 26 MMOL/L    sO2 (POC) 100 (H) 95 - 98 %    Base excess (POC) 1 mmol/L    Sodium,  136 - 145 MMOL/L    Potassium, POC 5.0 3.5 - 5.1 MMOL/L    Glucose,  (H) 65 - 100 MG/DL    Calcium, ionized (POC) 1.09 (L) 1.12 - 1.32 mmol/L   POC CG8I    Collection Time: 12/14/18 11:15 AM   Result Value Ref Range    pH (POC) 7.356 7.35 - 7.45      pCO2 (POC) 42.7 35 - 45 MMHG    pO2 (POC) 335 (H) 75 - 100 MMHG    HCO3 (POC) 23.9 22 - 26 MMOL/L    sO2 (POC) 100 (H) 95 - 98 %    Base deficit (POC) 2 mmol/L    Sodium,  136 - 145 MMOL/L    Potassium, POC 4.5 3.5 - 5.1 MMOL/L    Glucose,  (H) 65 - 100 MG/DL    Calcium, ionized (POC) 1.39 (H) 1.12 - 1.32 mmol/L   GLUCOSE, POC    Collection Time: 12/14/18 12:30 PM   Result Value Ref Range    Glucose (POC) 158 (H) 65 - 100 mg/dL   POC G3    Collection Time: 12/14/18 12:33 PM   Result Value Ref Range    Device: VENT      FIO2 (POC) 40 %    pH (POC) 7.326 (L) 7.35 - 7.45      pCO2 (POC) 34.1 (L) 35 - 45 MMHG    pO2 (POC) 145 (H) 75 - 100 MMHG    HCO3 (POC) 17.8 (L) 22 - 26 MMOL/L    sO2 (POC) 99 (H) 95 - 98 %    Base deficit (POC) 7 mmol/L    Mode SIMV      Tidal volume 500 ml    Set Rate 16 bpm    PEEP/CPAP (POC) 8 cmH2O    Pressure support 12 cmH2O    Allens test (POC) NOT APPLICABLE      Site DRAWN FROM ARTERIAL LINE      Patient temp.  98.6      Specimen type (POC) ARTERIAL      Performed by Anabella     CO2, POC 19 MMOL/L    Respiratory comment: 0      Exhaled minute volume 8.60 L/min    COLLECT TIME 9,799     METABOLIC PANEL, BASIC    Collection Time: 12/14/18 12:36 PM   Result Value Ref Range    Sodium 149 (H) 136 - 145 mmol/L    Potassium 4.0 3.5 - 5.1 mmol/L    Chloride 120 (H) 98 - 107 mmol/L    CO2 19 (L) 21 - 32 mmol/L    Anion gap 10 7 - 16 mmol/L    Glucose 159 (H) 65 - 100 mg/dL    BUN 14 8 - 23 MG/DL    Creatinine 0.65 0.6 - 1.0 MG/DL    GFR est AA >60 >60 ml/min/1.73m2    GFR est non-AA >60 >60 ml/min/1.73m2    Calcium 7.5 (L) 8.3 - 10.4 MG/DL   MAGNESIUM    Collection Time: 12/14/18 12:36 PM   Result Value Ref Range    Magnesium 3.6 (H) 1.8 - 2.4 mg/dL   CBC W/O DIFF    Collection Time: 12/14/18 12:36 PM   Result Value Ref Range    WBC 13.7 (H) 4.3 - 11.1 K/uL    RBC 3.05 (L) 4.05 - 5.2 M/uL    HGB 9.3 (L) 11.7 - 15.4 g/dL    HCT 29.2 (L) 35.8 - 46.3 %    MCV 95.7 79.6 - 97.8 FL    MCH 30.5 26.1 - 32.9 PG    MCHC 31.8 31.4 - 35.0 g/dL    RDW 12.6 11.9 - 14.6 %    PLATELET 513 (L) 446 - 450 K/uL    MPV 11.8 9.4 - 12.3 FL    ABSOLUTE NRBC 0.00 0.0 - 0.2 K/uL   PTT    Collection Time: 12/14/18 12:36 PM   Result Value Ref Range    aPTT 33.5 24.7 - 39.8 SEC   PROTHROMBIN TIME + INR    Collection Time: 12/14/18 12:36 PM   Result Value Ref Range    Prothrombin time 17.8 (H) 11.7 - 14.5 sec    INR 1.5     FIBRINOGEN    Collection Time: 12/14/18 12:36 PM   Result Value Ref Range    Fibrinogen 128 (L) 190 - 501 mg/dL   GLUCOSE, POC    Collection Time: 12/14/18  1:16 PM   Result Value Ref Range    Glucose (POC) 170 (H) 65 - 100 mg/dL   GLUCOSE, POC    Collection Time: 12/14/18  2:28 PM   Result Value Ref Range    Glucose (POC) 132 (H) 65 - 100 mg/dL       Pt has been seen and examined by Dr. Jaz Freed. He agrees with the following assessment and plan. Assessment/Plan:       Diagnosis    CHB-s/p asystolic arrest, temporarily paced, will plan BiV ICD today per Dr. Mira Pelayo CAD S/P CABG x 4- supportive care per primary team    ICM EF 30-35%- BiV ICD today    LBBB (left bundle branch block)    Dyslipidemia- statin    Overactive bladder    IBS (irritable bowel syndrome)    Fibromyalgia    GERD (gastroesophageal reflux disease)- PPI       Thank you for consulting [facility] and allowing us to participate in the care of this patient. We will continue to follow along with you. Екатерина Mondragon PA-C     Attending Addendum    Patient independently seen and examined by me. Agree with above note by physician extender with the following additions and exceptions: 68yo with CAD c/b cardiomyopathy now s/p CABG surgery complicated by asystole and now complete heart block with last EF 30%. Under the direction of the new guidelines, Pt has indications for biventricular pacing with complete heart block and no underlying escape rhythm in the setting of cardiomyopathy. Discussed the option of BiV PPM or BiV ICD with  today, explained if EF does not improve and pacemaker is implanted she would require upgrade, but if ICD was placed and EF improved she may not require ICD capabilities.  and family want to proceed with ICD. Key findings are:  No CP or ARRIAGA  CV- RR, paced rhythm, +rub  Lungs- Coarse, intubated  Ext- no edema    Plan: I had a long discussion today with the patient's family regarding the risks, benefits, and alternatives of a biventricular implantable cardiac rhythm device. I discussed in detail the potential benefits of ICD therapy, including improved mortality and prevention of SCD.  Additionally, I discussed with the patient the potential risks of ICD implantation, including the risk of bleeding, infection, large blood vessel injury, lung or cardiac injury, venous occlusion, DVT/PE, pneumothorax, cardiac tamponade, perforation, need for urgent open heart surgery or additional procedures, device/lead failure, lead dislodgement, inappropriate shock(s), heart attack, stroke, arrhythmia, oversedation, respiratory arrest, and even death. We also discussed at length the indications for the addition of an LV lead for cardiac resynchronization in the setting of underlying conduction system disease and/or for the clinical suspicion for a high degree of ventricular pacing. We discussed not every patient has clinical improvement with a biventricular device, and implantation of a biventricular device does slightly increase the risks of the procedure. In addition, on rare occasions a patient's anatomy or underlying ventricular scar does not allow for successful placement of an LV lead or acceptable pacing parameters. After our comprehensive discussion, the patient's family would like to proceed as detailed above. Champ Salvador  [address] [facility] Cardiology

## 2018-12-15 ENCOUNTER — APPOINTMENT (OUTPATIENT)
Dept: GENERAL RADIOLOGY | Age: 63
DRG: 226 | End: 2018-12-15
Attending: THORACIC SURGERY (CARDIOTHORACIC VASCULAR SURGERY)
Payer: MEDICARE

## 2018-12-15 LAB
ANION GAP SERPL CALC-SCNC: 4 MMOL/L (ref 7–16)
ATRIAL RATE: 89 BPM
ATRIAL RATE: 90 BPM
BUN SERPL-MCNC: 9 MG/DL (ref 8–23)
CALCIUM SERPL-MCNC: 6.6 MG/DL (ref 8.3–10.4)
CALCULATED P AXIS, ECG09: 67 DEGREES
CALCULATED P AXIS, ECG09: 73 DEGREES
CALCULATED R AXIS, ECG10: -98 DEGREES
CALCULATED R AXIS, ECG10: 29 DEGREES
CALCULATED T AXIS, ECG11: 17 DEGREES
CALCULATED T AXIS, ECG11: 71 DEGREES
CHLORIDE SERPL-SCNC: 120 MMOL/L (ref 98–107)
CO2 SERPL-SCNC: 24 MMOL/L (ref 21–32)
CREAT SERPL-MCNC: 0.46 MG/DL (ref 0.6–1)
DIAGNOSIS, 93000: NORMAL
DIAGNOSIS, 93000: NORMAL
ERYTHROCYTE [DISTWIDTH] IN BLOOD BY AUTOMATED COUNT: 13.1 % (ref 11.9–14.6)
GLUCOSE BLD STRIP.AUTO-MCNC: 100 MG/DL (ref 65–100)
GLUCOSE BLD STRIP.AUTO-MCNC: 101 MG/DL (ref 65–100)
GLUCOSE BLD STRIP.AUTO-MCNC: 112 MG/DL (ref 65–100)
GLUCOSE BLD STRIP.AUTO-MCNC: 122 MG/DL (ref 65–100)
GLUCOSE BLD STRIP.AUTO-MCNC: 91 MG/DL (ref 65–100)
GLUCOSE BLD STRIP.AUTO-MCNC: 92 MG/DL (ref 65–100)
GLUCOSE BLD STRIP.AUTO-MCNC: 98 MG/DL (ref 65–100)
GLUCOSE SERPL-MCNC: 103 MG/DL (ref 65–100)
HCT VFR BLD AUTO: 28.5 % (ref 35.8–46.3)
HGB BLD-MCNC: 9.1 G/DL (ref 11.7–15.4)
MAGNESIUM SERPL-MCNC: 2.2 MG/DL (ref 1.8–2.4)
MCH RBC QN AUTO: 31 PG (ref 26.1–32.9)
MCHC RBC AUTO-ENTMCNC: 31.9 G/DL (ref 31.4–35)
MCV RBC AUTO: 96.9 FL (ref 79.6–97.8)
MM INDURATION POC: NORMAL MM (ref 0–5)
NRBC # BLD: 0 K/UL (ref 0–0.2)
P-R INTERVAL, ECG05: 170 MS
P-R INTERVAL, ECG05: 178 MS
PLATELET # BLD AUTO: 108 K/UL (ref 150–450)
PMV BLD AUTO: 11.9 FL (ref 9.4–12.3)
POTASSIUM SERPL-SCNC: 3.8 MMOL/L (ref 3.5–5.1)
PPD POC: NEGATIVE NEGATIVE
Q-T INTERVAL, ECG07: 416 MS
Q-T INTERVAL, ECG07: 456 MS
QRS DURATION, ECG06: 132 MS
QRS DURATION, ECG06: 148 MS
QTC CALCULATION (BEZET), ECG08: 508 MS
QTC CALCULATION (BEZET), ECG08: 554 MS
RBC # BLD AUTO: 2.94 M/UL (ref 4.05–5.2)
SODIUM SERPL-SCNC: 148 MMOL/L (ref 136–145)
VENTRICULAR RATE, ECG03: 89 BPM
VENTRICULAR RATE, ECG03: 90 BPM
WBC # BLD AUTO: 7.6 K/UL (ref 4.3–11.1)

## 2018-12-15 PROCEDURE — 99232 SBSQ HOSP IP/OBS MODERATE 35: CPT | Performed by: INTERNAL MEDICINE

## 2018-12-15 PROCEDURE — 85027 COMPLETE CBC AUTOMATED: CPT

## 2018-12-15 PROCEDURE — 77030032994 HC SOL INJ SOD CL 0.9% LFCR 500ML

## 2018-12-15 PROCEDURE — 65610000006 HC RM INTENSIVE CARE

## 2018-12-15 PROCEDURE — 93005 ELECTROCARDIOGRAM TRACING: CPT | Performed by: INTERNAL MEDICINE

## 2018-12-15 PROCEDURE — 82962 GLUCOSE BLOOD TEST: CPT

## 2018-12-15 PROCEDURE — 74011250637 HC RX REV CODE- 250/637: Performed by: THORACIC SURGERY (CARDIOTHORACIC VASCULAR SURGERY)

## 2018-12-15 PROCEDURE — 83735 ASSAY OF MAGNESIUM: CPT

## 2018-12-15 PROCEDURE — 80048 BASIC METABOLIC PNL TOTAL CA: CPT

## 2018-12-15 PROCEDURE — 74011250636 HC RX REV CODE- 250/636: Performed by: THORACIC SURGERY (CARDIOTHORACIC VASCULAR SURGERY)

## 2018-12-15 PROCEDURE — 71045 X-RAY EXAM CHEST 1 VIEW: CPT

## 2018-12-15 PROCEDURE — 36600 WITHDRAWAL OF ARTERIAL BLOOD: CPT

## 2018-12-15 RX ORDER — TRAMADOL HYDROCHLORIDE 50 MG/1
100 TABLET ORAL
Status: DISCONTINUED | OUTPATIENT
Start: 2018-12-15 | End: 2018-12-17

## 2018-12-15 RX ORDER — OXYCODONE AND ACETAMINOPHEN 10; 325 MG/1; MG/1
1 TABLET ORAL
Status: DISCONTINUED | OUTPATIENT
Start: 2018-12-15 | End: 2018-12-18

## 2018-12-15 RX ADMIN — ASPIRIN 81 MG: 81 TABLET, COATED ORAL at 00:20

## 2018-12-15 RX ADMIN — Medication 10 ML: at 14:00

## 2018-12-15 RX ADMIN — OXYCODONE AND ACETAMINOPHEN 1 TABLET: 5; 325 TABLET ORAL at 01:46

## 2018-12-15 RX ADMIN — MUPIROCIN: 20 OINTMENT TOPICAL at 17:53

## 2018-12-15 RX ADMIN — AMIODARONE HYDROCHLORIDE 200 MG: 200 TABLET ORAL at 21:24

## 2018-12-15 RX ADMIN — Medication 10 ML: at 14:29

## 2018-12-15 RX ADMIN — POTASSIUM CHLORIDE 10 MEQ: 14.9 INJECTION, SOLUTION INTRAVENOUS at 04:35

## 2018-12-15 RX ADMIN — Medication 20 MCG/MIN: at 06:36

## 2018-12-15 RX ADMIN — POTASSIUM CHLORIDE 10 MEQ: 14.9 INJECTION, SOLUTION INTRAVENOUS at 01:09

## 2018-12-15 RX ADMIN — Medication 10 ML: at 06:37

## 2018-12-15 RX ADMIN — OXYCODONE AND ACETAMINOPHEN 1 TABLET: 5; 325 TABLET ORAL at 07:46

## 2018-12-15 RX ADMIN — CITALOPRAM HYDROBROMIDE 10 MG: 10 TABLET ORAL at 09:22

## 2018-12-15 RX ADMIN — ROSUVASTATIN CALCIUM 20 MG: 20 TABLET, FILM COATED ORAL at 21:29

## 2018-12-15 RX ADMIN — Medication 2 G: at 01:01

## 2018-12-15 RX ADMIN — OXYCODONE HYDROCHLORIDE AND ACETAMINOPHEN 1 TABLET: 10; 325 TABLET ORAL at 16:26

## 2018-12-15 RX ADMIN — MUPIROCIN: 20 OINTMENT TOPICAL at 09:23

## 2018-12-15 RX ADMIN — OXYCODONE HYDROCHLORIDE AND ACETAMINOPHEN 1 TABLET: 10; 325 TABLET ORAL at 11:38

## 2018-12-15 RX ADMIN — OXYCODONE HYDROCHLORIDE AND ACETAMINOPHEN 1 TABLET: 10; 325 TABLET ORAL at 21:23

## 2018-12-15 RX ADMIN — Medication 10 ML: at 21:31

## 2018-12-15 RX ADMIN — AMIODARONE HYDROCHLORIDE 200 MG: 200 TABLET ORAL at 09:22

## 2018-12-15 NOTE — PROGRESS NOTES
[facility] CARDIOLOGY PROGRESS NOTE           12/15/2018 7:15 AM    Admit Date: 12/14/2018    Admit Diagnosis: Atherosclerosis of native coronary artery of native heart with unstable angina pectoris ([facility] [address] [I25.110]    Assessment:   Active Problems:    CAD Nonobstructive 10-20% LAD on CTA Coronary (4/1/2015)      Encounter for weaning from ventilator ([address] (12/14/2018)      Hypoxia (12/14/2018)      S/P CABG x 4 (12/14/2018)        Plan:   1. Complete heart block with reduced EF - s/p SJM CRT-D with excellent results. Routine device interrogation. CXR clear of PTX and device interrogation is stable. 2. Continue GDMT. 3. Post op surgical care. Thank you for allowing me to participate in the electrophysiologic care of this most pleasant patient. Please feel free to contact me if there are any questions or concerns. Drea Robertson. MD [place], MS  Clinical Cardiac Electrophysiology  [facility] Cardiology    Subjective:   No complaints this AM, no chest pain or shortness of breath, expected post op pain. Interval History: (History of pertinent interval events obtained from nursing staff)    ROS:  GEN:  No fever or chills  Cardiovascular:  As noted above  Pulmonary:  As noted above  Neuro:  No new focal motor or sensory loss      Objective:     Vitals:    12/15/18 0617 12/15/18 0624 12/15/18 0630 12/15/18 0701   BP: (!) 87/48  (!) 79/45 (!) 89/52   Pulse: 96  90 82   Resp: 12  24 15   Temp:       SpO2: 100%  100% 100%   Weight:  154 lb 12.2 oz (70.2 kg)     Height:           Physical Exam:  General-Well Developed, Well Nourished, No Acute Distress. Neck- supple, no JVD  CV- regular rate and rhythm no MRG, left sided CIED C/D/I. Central SS, C/D/I. Lung- clear bilaterally  Abd- soft, nontender, nondistended  Ext- no edema bilaterally.   Skin- warm and dry    Current Facility-Administered Medications   Medication Dose Route Frequency    aspirin delayed-release tablet 81 mg  81 mg Oral QHS    carvedilol (COREG) tablet 3.125 mg  3.125 mg Oral BID WITH MEALS    citalopram (CELEXA) tablet 10 mg  10 mg Oral DAILY    rosuvastatin (CRESTOR) tablet 20 mg  20 mg Oral QHS    0.9% sodium chloride infusion  25 mL/hr IntraVENous CONTINUOUS    dextrose 5% - 0.45% NaCl with KCl 20 mEq/L infusion  25 mL/hr IntraVENous CONTINUOUS    sodium chloride (NS) flush 5-10 mL  5-10 mL IntraVENous Q8H    sodium chloride (NS) flush 5-10 mL  5-10 mL IntraVENous PRN    oxyCODONE-acetaminophen (PERCOCET) 5-325 mg per tablet 1 Tab  1 Tab Oral Q4H PRN    morphine 10 mg/ml injection 3-5 mg  3-5 mg IntraVENous Q1H PRN    naloxone (NARCAN) injection 0.4 mg  0.4 mg IntraVENous PRN    mupirocin (BACTROBAN) 2 % ointment   Both Nostrils BID    ceFAZolin (ANCEF) 2 g/20 mL in sterile water IV syringe  2 g IntraVENous Q8H    sodium bicarbonate 8.4 % (1 mEq/mL) injection 50 mEq  50 mEq IntraVENous PRN    EPINEPHrine (ADRENALIN) 4 mg in 0.9% sodium chloride 250 mL infusion  0.05-0.1 mcg/kg/min IntraVENous TITRATE    nitroglycerin (Tridil) 200 mcg/ml infusion   mcg/min IntraVENous TITRATE    PHENYLephrine (RENZO-SYNEPHRINE) 30 mg in 0.9% sodium chloride 250 mL infusion   mcg/min IntraVENous TITRATE    lidocaine (PF) (XYLOCAINE) 100 mg/5 mL (2 %) injection syringe  mg   mg IntraVENous ONCE PRN    amiodarone (CORDARONE) tablet 200 mg  200 mg Oral Q12H    ondansetron (ZOFRAN) injection 4 mg  4 mg IntraVENous Q4H PRN    insulin regular (NOVOLIN R, HUMULIN R) 100 Units in 0.9% sodium chloride 100 mL infusion  1 Units/hr IntraVENous TITRATE    dextrose (D50W) injection syrg 12.5 g  25 mL IntraVENous PRN    magnesium sulfate 1 g/100 ml IVPB (premix or compounded)  1 g IntraVENous PRN    potassium chloride 10 mEq in 50 ml IVPB  10 mEq IntraVENous PRN    midazolam (VERSED) injection 1 mg  1 mg IntraVENous Q1H PRN    propofol (DIPRIVAN) infusion  0-50 mcg/kg/min IntraVENous TITRATE    chlorhexidine (PERIDEX) 0.12 % mouthwash 10 mL  10 mL Oral BID    tuberculin injection 5 Units  5 Units IntraDERMal ONCE    influenza vaccine 2018-19 (6 mos+)(PF) (FLUARIX QUAD/FLULAVAL QUAD) injection 0.5 mL  0.5 mL IntraMUSCular PRIOR TO DISCHARGE    sodium chloride (NS) flush 5-10 mL  5-10 mL IntraVENous Q8H    sodium chloride (NS) flush 5-10 mL  5-10 mL IntraVENous PRN    dexmedeTOMidine (PRECEDEX) 400 mcg in 0.9% sodium chloride 100 mL infusion  0.2-0.7 mcg/kg/hr IntraVENous TITRATE       Data Review:   Recent Results (from the past 24 hour(s))   POC CG8I    Collection Time: 12/14/18  7:25 AM   Result Value Ref Range    pH (POC) 7.367 7.35 - 7.45      pCO2 (POC) 48.8 (H) 35 - 45 MMHG    pO2 (POC) 395 (H) 75 - 100 MMHG    HCO3 (POC) 28.0 (H) 22 - 26 MMOL/L    sO2 (POC) 100 (H) 95 - 98 %    Base excess (POC) 2 mmol/L    Sodium,  136 - 145 MMOL/L    Potassium, POC 3.8 3.5 - 5.1 MMOL/L    Glucose,  (H) 65 - 100 MG/DL    Calcium, ionized (POC) 1.26 1.12 - 1.32 mmol/L   POC CG8I    Collection Time: 12/14/18  9:20 AM   Result Value Ref Range    pH (POC) 7.375 7.35 - 7.45      pCO2 (POC) 45.6 (H) 35 - 45 MMHG    pO2 (POC) 278 (H) 75 - 100 MMHG    HCO3 (POC) 26.7 (H) 22 - 26 MMOL/L    sO2 (POC) 100 (H) 95 - 98 %    Base excess (POC) 1 mmol/L    Sodium,  (H) 136 - 145 MMOL/L    Potassium, POC 3.8 3.5 - 5.1 MMOL/L    Glucose,  (H) 65 - 100 MG/DL    Calcium, ionized (POC) 1.23 1.12 - 1.32 mmol/L   POC CG8I    Collection Time: 12/14/18  9:48 AM   Result Value Ref Range    pH (POC) 7.359 7.35 - 7.45      pCO2 (POC) 44.7 35 - 45 MMHG    pO2 (POC) 364 (H) 75 - 100 MMHG    HCO3 (POC) 25.2 22 - 26 MMOL/L    sO2 (POC) 100 (H) 95 - 98 %    Base excess (POC) 0 mmol/L    Sodium,  136 - 145 MMOL/L    Potassium, POC 4.0 3.5 - 5.1 MMOL/L    Glucose,  (H) 65 - 100 MG/DL    Calcium, ionized (POC) 1.09 (L) 1.12 - 1.32 mmol/L   POC CG8I    Collection Time: 12/14/18 10:24 AM   Result Value Ref Range    pH (POC) 7.374 7.35 - 7.45      pCO2 (POC) 39.6 35 - 45 MMHG    pO2 (POC) 220 (H) 75 - 100 MMHG    HCO3 (POC) 23.1 22 - 26 MMOL/L    sO2 (POC) 100 (H) 95 - 98 %    Base deficit (POC) 2 mmol/L    Sodium,  136 - 145 MMOL/L    Potassium, POC 5.0 3.5 - 5.1 MMOL/L    Glucose,  (H) 65 - 100 MG/DL    Calcium, ionized (POC) 1.11 (L) 1.12 - 1.32 mmol/L   POC CG8I    Collection Time: 12/14/18 10:45 AM   Result Value Ref Range    pH (POC) 7.390 7.35 - 7.45      pCO2 (POC) 42.2 35 - 45 MMHG    pO2 (POC) 332 (H) 75 - 100 MMHG    HCO3 (POC) 25.6 22 - 26 MMOL/L    sO2 (POC) 100 (H) 95 - 98 %    Base excess (POC) 1 mmol/L    Sodium,  136 - 145 MMOL/L    Potassium, POC 5.0 3.5 - 5.1 MMOL/L    Glucose,  (H) 65 - 100 MG/DL    Calcium, ionized (POC) 1.09 (L) 1.12 - 1.32 mmol/L   POC CG8I    Collection Time: 12/14/18 11:15 AM   Result Value Ref Range    pH (POC) 7.356 7.35 - 7.45      pCO2 (POC) 42.7 35 - 45 MMHG    pO2 (POC) 335 (H) 75 - 100 MMHG    HCO3 (POC) 23.9 22 - 26 MMOL/L    sO2 (POC) 100 (H) 95 - 98 %    Base deficit (POC) 2 mmol/L    Sodium,  136 - 145 MMOL/L    Potassium, POC 4.5 3.5 - 5.1 MMOL/L    Glucose,  (H) 65 - 100 MG/DL    Calcium, ionized (POC) 1.39 (H) 1.12 - 1.32 mmol/L   GLUCOSE, POC    Collection Time: 12/14/18 12:30 PM   Result Value Ref Range    Glucose (POC) 158 (H) 65 - 100 mg/dL   POC G3    Collection Time: 12/14/18 12:33 PM   Result Value Ref Range    Device: VENT      FIO2 (POC) 40 %    pH (POC) 7.326 (L) 7.35 - 7.45      pCO2 (POC) 34.1 (L) 35 - 45 MMHG    pO2 (POC) 145 (H) 75 - 100 MMHG    HCO3 (POC) 17.8 (L) 22 - 26 MMOL/L    sO2 (POC) 99 (H) 95 - 98 %    Base deficit (POC) 7 mmol/L    Mode SIMV      Tidal volume 500 ml    Set Rate 16 bpm    PEEP/CPAP (POC) 8 cmH2O    Pressure support 12 cmH2O    Allens test (POC) NOT APPLICABLE      Site DRAWN FROM ARTERIAL LINE      Patient temp.  98.6      Specimen type (POC) ARTERIAL      Performed by Anabella     CO2, POC 19 MMOL/L Respiratory comment: 0      Exhaled minute volume 8.60 L/min    COLLECT TIME 1,989     METABOLIC PANEL, BASIC    Collection Time: 12/14/18 12:36 PM   Result Value Ref Range    Sodium 149 (H) 136 - 145 mmol/L    Potassium 4.0 3.5 - 5.1 mmol/L    Chloride 120 (H) 98 - 107 mmol/L    CO2 19 (L) 21 - 32 mmol/L    Anion gap 10 7 - 16 mmol/L    Glucose 159 (H) 65 - 100 mg/dL    BUN 14 8 - 23 MG/DL    Creatinine 0.65 0.6 - 1.0 MG/DL    GFR est AA >60 >60 ml/min/1.73m2    GFR est non-AA >60 >60 ml/min/1.73m2    Calcium 7.5 (L) 8.3 - 10.4 MG/DL   MAGNESIUM    Collection Time: 12/14/18 12:36 PM   Result Value Ref Range    Magnesium 3.6 (H) 1.8 - 2.4 mg/dL   CBC W/O DIFF    Collection Time: 12/14/18 12:36 PM   Result Value Ref Range    WBC 13.7 (H) 4.3 - 11.1 K/uL    RBC 3.05 (L) 4.05 - 5.2 M/uL    HGB 9.3 (L) 11.7 - 15.4 g/dL    HCT 29.2 (L) 35.8 - 46.3 %    MCV 95.7 79.6 - 97.8 FL    MCH 30.5 26.1 - 32.9 PG    MCHC 31.8 31.4 - 35.0 g/dL    RDW 12.6 11.9 - 14.6 %    PLATELET 739 (L) 212 - 450 K/uL    MPV 11.8 9.4 - 12.3 FL    ABSOLUTE NRBC 0.00 0.0 - 0.2 K/uL   PTT    Collection Time: 12/14/18 12:36 PM   Result Value Ref Range    aPTT 33.5 24.7 - 39.8 SEC   PROTHROMBIN TIME + INR    Collection Time: 12/14/18 12:36 PM   Result Value Ref Range    Prothrombin time 17.8 (H) 11.7 - 14.5 sec    INR 1.5     FIBRINOGEN    Collection Time: 12/14/18 12:36 PM   Result Value Ref Range    Fibrinogen 128 (L) 190 - 501 mg/dL   GLUCOSE, POC    Collection Time: 12/14/18  1:16 PM   Result Value Ref Range    Glucose (POC) 170 (H) 65 - 100 mg/dL   GLUCOSE, POC    Collection Time: 12/14/18  2:28 PM   Result Value Ref Range    Glucose (POC) 132 (H) 65 - 100 mg/dL   GLUCOSE, POC    Collection Time: 12/14/18  3:00 PM   Result Value Ref Range    Glucose (POC) 111 (H) 65 - 100 mg/dL   GLUCOSE, POC    Collection Time: 12/14/18  4:03 PM   Result Value Ref Range    Glucose (POC) 84 65 - 100 mg/dL   GLUCOSE, POC    Collection Time: 12/14/18  6:55 PM Result Value Ref Range    Glucose (POC) 136 (H) 65 - 100 mg/dL   MAGNESIUM    Collection Time: 12/14/18  7:13 PM   Result Value Ref Range    Magnesium 2.4 1.8 - 2.4 mg/dL   POTASSIUM    Collection Time: 12/14/18  7:13 PM   Result Value Ref Range    Potassium 3.8 3.5 - 5.1 mmol/L   HGB & HCT    Collection Time: 12/14/18  7:13 PM   Result Value Ref Range    HGB 9.1 (L) 11.7 - 15.4 g/dL    HCT 28.5 (L) 35.8 - 46.3 %   EKG, 12 LEAD, INITIAL    Collection Time: 12/14/18  8:12 PM   Result Value Ref Range    Ventricular Rate 89 BPM    Atrial Rate 89 BPM    P-R Interval 170 ms    QRS Duration 148 ms    Q-T Interval 456 ms    QTC Calculation (Bezet) 554 ms    Calculated P Axis 67 degrees    Calculated R Axis -98 degrees    Calculated T Axis 71 degrees    Diagnosis       Atrial-sensed ventricular-paced rhythm  Biventricular pacemaker detected  Abnormal ECG  When compared with ECG of 12-DEC-2018 10:36,  Electronic ventricular pacemaker has replaced Sinus rhythm  Confirmed by Shania Gomez ([id number]) on 12/15/2018 6:50:14 AM     GLUCOSE, POC    Collection Time: 12/14/18  8:25 PM   Result Value Ref Range    Glucose (POC) 143 (H) 65 - 100 mg/dL   POC G3    Collection Time: 12/14/18  8:28 PM   Result Value Ref Range    Device: VENT      FIO2 (POC) 30 %    pH (POC) 7.279 (L) 7.35 - 7.45      pCO2 (POC) 40.0 35 - 45 MMHG    pO2 (POC) 110 (H) 75 - 100 MMHG    HCO3 (POC) 18.8 (L) 22 - 26 MMOL/L    sO2 (POC) 98 95 - 98 %    Base deficit (POC) 7 mmol/L    Mode VENTILATOR/SPONTANEOUS/PRESSURE SUPPORT      PEEP/CPAP (POC) 8 cmH2O    Pressure support 8 cmH2O    Allens test (POC) NOT APPLICABLE      Total resp. rate 13      Site DRAWN FROM ARTERIAL LINE      Patient temp.  98.6      Specimen type (POC) ARTERIAL      Performed by Alen     CO2, POC 20 MMOL/L    Critical value read back 20:26     Respiratory comment: NurseNotified     Exhaled minute volume 6.30 L/min    COLLECT TIME 2,025     GLUCOSE, POC    Collection Time: 12/14/18  9:12 PM   Result Value Ref Range    Glucose (POC) 143 (H) 65 - 100 mg/dL   GLUCOSE, POC    Collection Time: 12/14/18 10:24 PM   Result Value Ref Range    Glucose (POC) 122 (H) 65 - 100 mg/dL   POC G3    Collection Time: 12/14/18 10:27 PM   Result Value Ref Range    Device: VENT      FIO2 (POC) 30 %    pH (POC) 7.345 (L) 7.35 - 7.45      pCO2 (POC) 39.9 35 - 45 MMHG    pO2 (POC) 108 (H) 75 - 100 MMHG    HCO3 (POC) 21.8 (L) 22 - 26 MMOL/L    sO2 (POC) 98 95 - 98 %    Base deficit (POC) 4 mmol/L    Mode VENTILATOR/SPONTANEOUS/PRESSURE SUPPORT      PEEP/CPAP (POC) 8 cmH2O    Pressure support 8 cmH2O    Allens test (POC) NO      Total resp. rate 13      Site DRAWN FROM ARTERIAL LINE      Patient temp.  98.6      Specimen type (POC) ARTERIAL      Performed by Alen     CO2, POC 23 MMOL/L    Critical value read back 22:29     Exhaled minute volume 6.20 L/min    COLLECT TIME 2,225     MAGNESIUM    Collection Time: 12/14/18 11:04 PM   Result Value Ref Range    Magnesium 2.2 1.8 - 2.4 mg/dL   POTASSIUM    Collection Time: 12/14/18 11:04 PM   Result Value Ref Range    Potassium 3.6 3.5 - 5.1 mmol/L   HGB & HCT    Collection Time: 12/14/18 11:04 PM   Result Value Ref Range    HGB 9.0 (L) 11.7 - 15.4 g/dL    HCT 28.0 (L) 35.8 - 46.3 %   GLUCOSE, POC    Collection Time: 12/14/18 11:06 PM   Result Value Ref Range    Glucose (POC) 116 (H) 65 - 100 mg/dL   GLUCOSE, POC    Collection Time: 12/15/18 12:17 AM   Result Value Ref Range    Glucose (POC) 101 (H) 65 - 100 mg/dL   GLUCOSE, POC    Collection Time: 12/15/18  1:06 AM   Result Value Ref Range    Glucose (POC) 92 65 - 100 mg/dL   GLUCOSE, POC    Collection Time: 12/15/18  2:52 AM   Result Value Ref Range    Glucose (POC) 100 65 - 100 mg/dL   MAGNESIUM    Collection Time: 12/15/18  2:54 AM   Result Value Ref Range    Magnesium 2.2 1.8 - 2.4 mg/dL   CBC W/O DIFF    Collection Time: 12/15/18  2:54 AM   Result Value Ref Range    WBC 7.6 4.3 - 11.1 K/uL    RBC 2.94 (L) 4.05 - 5.2 M/uL    HGB 9.1 (L) 11.7 - 15.4 g/dL    HCT 28.5 (L) 35.8 - 46.3 %    MCV 96.9 79.6 - 97.8 FL    MCH 31.0 26.1 - 32.9 PG    MCHC 31.9 31.4 - 35.0 g/dL    RDW 13.1 11.9 - 14.6 %    PLATELET 035 (L) 726 - 450 K/uL    MPV 11.9 9.4 - 12.3 FL    ABSOLUTE NRBC 0.00 0.0 - 0.2 K/uL   METABOLIC PANEL, BASIC    Collection Time: 12/15/18  2:54 AM   Result Value Ref Range    Sodium 148 (H) 136 - 145 mmol/L    Potassium 3.8 3.5 - 5.1 mmol/L    Chloride 120 (H) 98 - 107 mmol/L    CO2 24 21 - 32 mmol/L    Anion gap 4 (L) 7 - 16 mmol/L    Glucose 103 (H) 65 - 100 mg/dL    BUN 9 8 - 23 MG/DL    Creatinine 0.46 (L) 0.6 - 1.0 MG/DL    GFR est AA >60 >60 ml/min/1.73m2    GFR est non-AA >60 >60 ml/min/1.73m2    Calcium 6.6 (L) 8.3 - 10.4 MG/DL   GLUCOSE, POC    Collection Time: 12/15/18  4:49 AM   Result Value Ref Range    Glucose (POC) 98 65 - 100 mg/dL   GLUCOSE, POC    Collection Time: 12/15/18  6:27 AM   Result Value Ref Range    Glucose (POC) 91 65 - 100 mg/dL       EKG:  (EKG has been independently visualized by me with interpretation below): Biventricular pacing.

## 2018-12-15 NOTE — PROGRESS NOTES
Progress Note    Patient: Marquita Bence MRN: [id number]  SSN: [id number]    YOB: 1955  Age: 70 y.o. Sex: female      Admit Date: 12/14/2018    LOS: 1 day    POD 1 s/p CORONARY ARTERY BYPASS GRAFT X  3 , LIMA to the LAD, sequential SVG to ramus and to OM  VEIN HARVEST GREATER SAPHENOUS  ESOPHAGEAL TRANS ECHOCARDIOGRAM; s/p PPM placement / ICD      Subjective:     Required PPM / ICD postop for complete heart block (intraop arrest). No issues postop except for marginal BP. Objective:     Vitals:    12/15/18 0546 12/15/18 0601 12/15/18 0617 12/15/18 0624   BP: 100/56 97/52 (!) 87/48    Pulse: 96 99 96    Resp: 18 19 12    Temp:       SpO2: 100%  100%    Weight:    70.2 kg (154 lb 12.2 oz)   Height:              Intake and Output:  Current Shift: 12/14 1901 - 12/15 0700  In: 1768.8 [P.O.:240; I.V.:1468.8]  Out: 1305 [Urine:1095]  Chest tube drainage in 24 hrs: 423  Last three shifts: 12/13 0701 - 12/14 1900  In: 3384.2 [I.V.:2884.2]  Out: 1087 [Urine:5180]  Last 3 Recorded Weights in this Encounter    12/14/18 0617 12/15/18 0624   Weight: 62.6 kg (138 lb 1 oz) 70.2 kg (154 lb 12.2 oz)       Intake/Output Summary (Last 24 hours) at 12/15/2018 0636  Last data filed at 12/15/2018 0624  Gross per 24 hour   Intake 5152.95 ml   Output 6698 ml   Net -1545.05 ml     Date 12/14/18 0700 - 12/15/18 0659 12/15/18 0700 - 12/16/18 0659   Shift 0719-6814 0694-3730 24 Hour Total 5368-5512 4043-6140 24 Hour Total   INTAKE   P.O.  240 240        P. O.  240 240      I. V.(mL/kg/hr) 2500(3.3) 1853 4353        I.V.  500 500        Precedex Volume  31.2 31.2        Diprivan Volume  110.9 110.9        Insulin Volume  17.5 17.5        Nitroglycerin Volume (ml)  36.2 36.2        Epinephrine Volume  8.1 8.1        Phenylephrine Volume  117.4 117.4        Volume (lactated Ringers infusion) 500  500        Volume (0.9% sodium chloride infusion) 2000  2000        Volume (0.9% sodium chloride infusion)  420 420 Volume (dextrose 5% - 0.45% NaCl with KCl 20 mEq/L infusion)  441.7 441.7        Volume (ceFAZolin (ANCEF) 2 g/20 mL in sterile water IV syringe)  20 20        Volume (potassium chloride 10 mEq in 50 ml IVPB)  150 150      Blood 500  500        Autotransfused 500  500      NG/GT  60 60        Water Flush Volume (mL) ([REMOVED] Orogastric Tube 12/14/18)  30 30        Medication Volume ([REMOVED] Orogastric Tube 12/14/18)  30 30      Shift Total(mL/kg) 3000(47.9) 7629(80.0) 6428(25.9)      OUTPUT   Urine(mL/kg/hr) 5180(6.9) 1095 6275        Urine Output 2630  2630        Urine Output (mL) (Urinary Catheter 12/14/18 2- way; [place] - Temperature) 2550 1095 3645      Chest Tube 213 210 423        Output (ml) (Chest Tube #1 12/14/18 Angled;Left;Pleural) 213 210 423      Shift Total(mL/kg) 9598(27.0) 8333(08.3) 5922(46.8)      NET -2393 848 -1545. 1      Weight (kg) 62.6 70.2 70.2 70.2 70.2 70.2         Physical Exam:   Neuro:  Awake and alert. Nonfocal.  Neck:  No JVD  Chest:  Incision clean; sternum intact  Cor:  rrr without murmur, rub, or gallop. Lungs:  Clear to auscultation  Ext:  Incisions clean. Min edema.     Lab/Data Review:  BMP:   Lab Results   Component Value Date/Time     (H) 12/15/2018 02:54 AM    K 3.8 12/15/2018 02:54 AM     (H) 12/15/2018 02:54 AM    CO2 24 12/15/2018 02:54 AM    AGAP 4 (L) 12/15/2018 02:54 AM     (H) 12/15/2018 02:54 AM    BUN 9 12/15/2018 02:54 AM    CREA 0.46 (L) 12/15/2018 02:54 AM    GFRAA >60 12/15/2018 02:54 AM    GFRNA >60 12/15/2018 02:54 AM     CMP:   Lab Results   Component Value Date/Time     (H) 12/15/2018 02:54 AM    K 3.8 12/15/2018 02:54 AM     (H) 12/15/2018 02:54 AM    CO2 24 12/15/2018 02:54 AM    AGAP 4 (L) 12/15/2018 02:54 AM     (H) 12/15/2018 02:54 AM    BUN 9 12/15/2018 02:54 AM    CREA 0.46 (L) 12/15/2018 02:54 AM    GFRAA >60 12/15/2018 02:54 AM    GFRNA >60 12/15/2018 02:54 AM    CA 6.6 (L) 12/15/2018 02:54 AM    MG 2.2 12/15/2018 02:54 AM     CBC:   Lab Results   Component Value Date/Time    WBC 7.6 12/15/2018 02:54 AM    HGB 9.1 (L) 12/15/2018 02:54 AM    HCT 28.5 (L) 12/15/2018 02:54 AM     (L) 12/15/2018 02:54 AM     All Cardiac Markers in the last 24 hours: No results found for: CPK, CK, CKMMB, CKMB, RCK3, CKMBT, CKNDX, CKND1, MICHELLE, TROPT, TROIQ, LAKEISHA, TROPT, TNIPOC, BNP, BNPP  Recent Glucose Results:   Lab Results   Component Value Date/Time     (H) 12/15/2018 02:54 AM     (H) 12/14/2018 12:36 PM     ABG:   Lab Results   Component Value Date/Time    PHI 7.345 (L) 12/14/2018 10:27 PM    PCO2I 39.9 12/14/2018 10:27 PM    PO2I 108 (H) 12/14/2018 10:27 PM    HCO3I 21.8 (L) 12/14/2018 10:27 PM    FIO2I 30 12/14/2018 10:27 PM     COAGS:   Lab Results   Component Value Date/Time    APTT 33.5 12/14/2018 12:36 PM    PTP 17.8 (H) 12/14/2018 12:36 PM    INR 1.5 12/14/2018 12:36 PM     Liver Panel: No results found for: ALB, CBIL, TBIL, TP, GLOB, AGRAT, SGOT, ASTPOC, ALTPOC, ALT, GPT, AP         Assessment:     The patient is presently POD #1 from a CABG x 3, PPM/ICD. Overall, patient is doing well. Major issues: Borderline BP requiring can. Active Problems:    CAD Nonobstructive 10-20% LAD on CTA Coronary (4/1/2015)      Encounter for weaning from ventilator ([address] (12/14/2018)      Hypoxia (12/14/2018)      S/P CABG x 4 (12/14/2018)        Plan:       Cardiac: Stable. Respiratory: Wean oxygen as tolerated. Renal: Stable. GI: Advance as tolerated. Neuro:  Stable. Wires: In.    Tubes: Out per protocol. Disposition:  Stay in CVICU. Transfer to stepdown tomorrow if BP better.         Signed By: Berkley Oppenheim, MD     December 15, 2018

## 2018-12-15 NOTE — PROGRESS NOTES
Pt assesed for extubation readiness. vt 300  Rate 18  rsbi 53  nif -25   vc 950  Pt awake and oriented following commands . Able to support head from pillow. Normal cuff leak noted when volume removed. Pt extubated per protocol to heated high flow 50l 35%. Pt in no distress tolarated procedure and vocalizing well.

## 2018-12-15 NOTE — PROGRESS NOTES
Ventilator check complete; patient has a #7. 0 ET tube secured at the 23 at the lip. Patient is sedated. Patient is able to follow commands. Breath sounds are diminished. Trachea is midline, Negative for subcutaneous air, and chest excursion is symmetric. Patient is also Negative for cyanosis and is Negative for pitting edema. All alarms are set and audible. Resuscitation bag is at the head of the bed. Ventilator Settings  Mode FIO2 Rate Tidal Volume Pressure PEEP I:E Ratio   Pressure support  30 %    500 ml  8 cm H2O  8 cm H20         Peak airway pressure: 17.1 cm H2O   Minute ventilation: 8.1 l/min     ABG: No results for input(s): PH, PCO2, PO2, HCO3 in the last 72 hours.       Torey Champion, RT

## 2018-12-15 NOTE — PROGRESS NOTES
Critical Care Daily Progress Note: 12/15/2018    Belinda Carbajal   Admission Date: 12/14/2018         The patient's chart is reviewed and the patient is discussed with the staff.     70 y old WF s//p CABG  X 4        Subjective:     Extubated last night  Complains of shoulder pain     Current Facility-Administered Medications   Medication Dose Route Frequency    aspirin delayed-release tablet 81 mg  81 mg Oral QHS    carvedilol (COREG) tablet 3.125 mg  3.125 mg Oral BID WITH MEALS    citalopram (CELEXA) tablet 10 mg  10 mg Oral DAILY    rosuvastatin (CRESTOR) tablet 20 mg  20 mg Oral QHS    0.9% sodium chloride infusion  25 mL/hr IntraVENous CONTINUOUS    dextrose 5% - 0.45% NaCl with KCl 20 mEq/L infusion  25 mL/hr IntraVENous CONTINUOUS    sodium chloride (NS) flush 5-10 mL  5-10 mL IntraVENous Q8H    sodium chloride (NS) flush 5-10 mL  5-10 mL IntraVENous PRN    oxyCODONE-acetaminophen (PERCOCET) 5-325 mg per tablet 1 Tab  1 Tab Oral Q4H PRN    morphine 10 mg/ml injection 3-5 mg  3-5 mg IntraVENous Q1H PRN    naloxone (NARCAN) injection 0.4 mg  0.4 mg IntraVENous PRN    mupirocin (BACTROBAN) 2 % ointment   Both Nostrils BID    ceFAZolin (ANCEF) 2 g/20 mL in sterile water IV syringe  2 g IntraVENous Q8H    sodium bicarbonate 8.4 % (1 mEq/mL) injection 50 mEq  50 mEq IntraVENous PRN    EPINEPHrine (ADRENALIN) 4 mg in 0.9% sodium chloride 250 mL infusion  0.05-0.1 mcg/kg/min IntraVENous TITRATE    nitroglycerin (Tridil) 200 mcg/ml infusion   mcg/min IntraVENous TITRATE    PHENYLephrine (RENZO-SYNEPHRINE) 30 mg in 0.9% sodium chloride 250 mL infusion   mcg/min IntraVENous TITRATE    lidocaine (PF) (XYLOCAINE) 100 mg/5 mL (2 %) injection syringe  mg   mg IntraVENous ONCE PRN    amiodarone (CORDARONE) tablet 200 mg  200 mg Oral Q12H    ondansetron (ZOFRAN) injection 4 mg  4 mg IntraVENous Q4H PRN    insulin regular (NOVOLIN R, HUMULIN R) 100 Units in 0.9% sodium chloride 100 mL infusion  1 Units/hr IntraVENous TITRATE    dextrose (D50W) injection syrg 12.5 g  25 mL IntraVENous PRN    magnesium sulfate 1 g/100 ml IVPB (premix or compounded)  1 g IntraVENous PRN    potassium chloride 10 mEq in 50 ml IVPB  10 mEq IntraVENous PRN    midazolam (VERSED) injection 1 mg  1 mg IntraVENous Q1H PRN    propofol (DIPRIVAN) infusion  0-50 mcg/kg/min IntraVENous TITRATE    chlorhexidine (PERIDEX) 0.12 % mouthwash 10 mL  10 mL Oral BID    tuberculin injection 5 Units  5 Units IntraDERMal ONCE    influenza vaccine 2018-19 (6 mos+)(PF) (FLUARIX QUAD/FLULAVAL QUAD) injection 0.5 mL  0.5 mL IntraMUSCular PRIOR TO DISCHARGE    sodium chloride (NS) flush 5-10 mL  5-10 mL IntraVENous Q8H    sodium chloride (NS) flush 5-10 mL  5-10 mL IntraVENous PRN    dexmedeTOMidine (PRECEDEX) 400 mcg in 0.9% sodium chloride 100 mL infusion  0.2-0.7 mcg/kg/hr IntraVENous TITRATE       Review of Systems    Constitutional:  negative for fever, chills, sweats  Cardiovascular:  negative for chest pain, palpitations, syncope, edema  Gastrointestinal:  negative for dysphagia, reflux, vomiting, diarrhea, abdominal pain, or melena  Neurologic:  negative for focal weakness, numbness, headache      Objective:     Vitals:    12/15/18 0546 12/15/18 0601 12/15/18 0617 12/15/18 0624   BP: 100/56 97/52 (!) 87/48    Pulse: 96 99 96    Resp: 18 19 12    Temp:       SpO2: 100%  100%    Weight:    154 lb 12.2 oz (70.2 kg)   Height:           Intake and Output:   12/13 0701 - 12/14 1900  In: 3384.2 [I.V.:2884.2]  Out: 4212 [Urine:5180]  12/14 1901 - 12/15 0700  In: 1768.8 [P.O.:240; I.V.:1468.8]  Out: 4469 [Urine:1095]    Physical Exam:          Constitutional:  the patient is well developed and in no acute distress  EENMT:  Sclera clear, pupils equal, oral mucosa moist  Respiratory: clear  Cardiovascular:  RRR without M,G,R  Gastrointestinal: soft and non-tender; with positive bowel sounds.   Musculoskeletal: warm without cyanosis. There is no lower leg edema.   Skin:  no jaundice or rashes, surgical wounds   Neurologic: no gross neuro deficits     Psychiatric:  alert and oriented x 3    LINES:  KRAIG tao, [place], CT     DRIPS:  none    CXR:       LAB  Recent Labs     12/15/18  0449 12/15/18  0252 12/15/18  0106 12/15/18  0017 12/14/18  2306   GLUCPOC 98 100 92 101* 116*      Recent Labs     12/15/18  0254 12/14/18  2304 12/14/18 1913 12/14/18  1236 12/12/18  0912   WBC 7.6  --   --  13.7* 4.9   HGB 9.1* 9.0* 9.1* 9.3* 13.9   HCT 28.5* 28.0* 28.5* 29.2* 43.2   *  --   --  138* 203   INR  --   --   --  1.5 1.0     Recent Labs     12/15/18  0254 12/14/18  2304 12/14/18 1913 12/14/18  1236 12/12/18  0912   *  --   --  149* 139   K 3.8 3.6 3.8 4.0 4.4   *  --   --  120* 105   CO2 24  --   --  19* 30   *  --   --  159* 104*   BUN 9  --   --  14 17   CREA 0.46*  --   --  0.65 0.75   MG 2.2 2.2 2.4 3.6* 2.5*   CA 6.6*  --   --  7.5* 9.3   ALB  --   --   --   --  3.9   TBILI  --   --   --   --  0.2   ALT  --   --   --   --  21   SGOT  --   --   --   --  15     Recent Labs     12/14/18 2227 12/14/18 2028 12/14/18  1233   PHI 7.345* 7.279* 7.326*   PCO2I 39.9 40.0 34.1*   PO2I 108* 110* 145*   HCO3I 21.8* 18.8* 17.8*       Assessment:  (Medical Decision Making)     Hospital Problems  Date Reviewed: 11/29/2018          Codes Class Noted POA    Encounter for weaning from ventilator [facility]) extubated  ICD-10-CM: Z99.11  ICD-9-CM: V46.13  12/14/2018 Unknown        Hypoxia on 2L NC ICD-10-CM: R09.02  ICD-9-CM: 799.02  12/14/2018 Unknown        S/P CABG x 4 per surgery ICD-10-CM: Z95.1  ICD-9-CM: V45.81  12/14/2018 Unknown        CAD Nonobstructive 10-20% LAD on CTA Coronary ICD-10-CM: I25.10  ICD-9-CM: 414.00  4/1/2015 Unknown              Plan:  (Medical Decision Making)     -- wean 02  - IS, pulmonary toilet  mobilize    More than 50% of the time documented was spent in face-to-face contact with the patient and in the care of the patient on the floor/unit where the patient is located.     Fidelina Marquez MD

## 2018-12-15 NOTE — PROGRESS NOTES
RT at bedside. Pt able to meet requirements for extubation (physical mechanics and NIF). Procedure explained to Pt. Pt nodded understanding. VSS. Pt extubated at this time. Mouth and throat suctioned as Pt coughed to removed all secretions. BBS , no stridor noted. Pt able to verbalize name and cough effectively. Pt tolerated procedure well, no acute distress noted. Pt placed on 50L/35%o2 via Airvo , O2 sat 100%. Will monitor respiratory status.

## 2018-12-15 NOTE — PROGRESS NOTES
Dual skin assessment completed. No redness or breakdown noted. Foam dressing reapplied to sacral area.

## 2018-12-16 ENCOUNTER — APPOINTMENT (OUTPATIENT)
Dept: GENERAL RADIOLOGY | Age: 63
DRG: 226 | End: 2018-12-16
Attending: THORACIC SURGERY (CARDIOTHORACIC VASCULAR SURGERY)
Payer: MEDICARE

## 2018-12-16 LAB
ABO + RH BLD: NORMAL
ANION GAP SERPL CALC-SCNC: 8 MMOL/L (ref 7–16)
BLD PROD TYP BPU: NORMAL
BLD PROD TYP BPU: NORMAL
BLOOD GROUP ANTIBODIES SERPL: NORMAL
BPU ID: NORMAL
BPU ID: NORMAL
BUN SERPL-MCNC: 12 MG/DL (ref 8–23)
CALCIUM SERPL-MCNC: 7.6 MG/DL (ref 8.3–10.4)
CHLORIDE SERPL-SCNC: 111 MMOL/L (ref 98–107)
CO2 SERPL-SCNC: 23 MMOL/L (ref 21–32)
CREAT SERPL-MCNC: 0.43 MG/DL (ref 0.6–1)
CROSSMATCH RESULT,%XM: NORMAL
CROSSMATCH RESULT,%XM: NORMAL
ERYTHROCYTE [DISTWIDTH] IN BLOOD BY AUTOMATED COUNT: 13.2 % (ref 11.9–14.6)
GLUCOSE BLD STRIP.AUTO-MCNC: 118 MG/DL (ref 65–100)
GLUCOSE SERPL-MCNC: 106 MG/DL (ref 65–100)
HCT VFR BLD AUTO: 26.9 % (ref 35.8–46.3)
HGB BLD-MCNC: 8.4 G/DL (ref 11.7–15.4)
MAGNESIUM SERPL-MCNC: 2.5 MG/DL (ref 1.8–2.4)
MCH RBC QN AUTO: 31 PG (ref 26.1–32.9)
MCHC RBC AUTO-ENTMCNC: 31.2 G/DL (ref 31.4–35)
MCV RBC AUTO: 99.3 FL (ref 79.6–97.8)
MM INDURATION POC: NORMAL MM (ref 0–5)
NRBC # BLD: 0 K/UL (ref 0–0.2)
PLATELET # BLD AUTO: 88 K/UL (ref 150–450)
PMV BLD AUTO: 12.3 FL (ref 9.4–12.3)
POTASSIUM SERPL-SCNC: 3.5 MMOL/L (ref 3.5–5.1)
PPD POC: NORMAL NEGATIVE
RBC # BLD AUTO: 2.71 M/UL (ref 4.05–5.2)
SODIUM SERPL-SCNC: 142 MMOL/L (ref 136–145)
SPECIMEN EXP DATE BLD: NORMAL
STATUS OF UNIT,%ST: NORMAL
STATUS OF UNIT,%ST: NORMAL
UNIT DIVISION, %UDIV: 0
UNIT DIVISION, %UDIV: 0
WBC # BLD AUTO: 7.8 K/UL (ref 4.3–11.1)

## 2018-12-16 PROCEDURE — 74011250636 HC RX REV CODE- 250/636: Performed by: THORACIC SURGERY (CARDIOTHORACIC VASCULAR SURGERY)

## 2018-12-16 PROCEDURE — 74011250637 HC RX REV CODE- 250/637: Performed by: INTERNAL MEDICINE

## 2018-12-16 PROCEDURE — 77030020263 HC SOL INJ SOD CL0.9% LFCR 1000ML

## 2018-12-16 PROCEDURE — 65610000006 HC RM INTENSIVE CARE

## 2018-12-16 PROCEDURE — 71045 X-RAY EXAM CHEST 1 VIEW: CPT

## 2018-12-16 PROCEDURE — 99232 SBSQ HOSP IP/OBS MODERATE 35: CPT | Performed by: INTERNAL MEDICINE

## 2018-12-16 PROCEDURE — 74011250637 HC RX REV CODE- 250/637: Performed by: THORACIC SURGERY (CARDIOTHORACIC VASCULAR SURGERY)

## 2018-12-16 PROCEDURE — 85027 COMPLETE CBC AUTOMATED: CPT

## 2018-12-16 PROCEDURE — 83735 ASSAY OF MAGNESIUM: CPT

## 2018-12-16 PROCEDURE — 82962 GLUCOSE BLOOD TEST: CPT

## 2018-12-16 PROCEDURE — 80048 BASIC METABOLIC PNL TOTAL CA: CPT

## 2018-12-16 RX ORDER — CARVEDILOL 3.12 MG/1
3.12 TABLET ORAL 2 TIMES DAILY WITH MEALS
Status: DISCONTINUED | OUTPATIENT
Start: 2018-12-16 | End: 2018-12-17

## 2018-12-16 RX ORDER — LISINOPRIL 5 MG/1
2.5 TABLET ORAL DAILY
Status: DISCONTINUED | OUTPATIENT
Start: 2018-12-16 | End: 2018-12-17

## 2018-12-16 RX ORDER — PANTOPRAZOLE SODIUM 40 MG/1
40 TABLET, DELAYED RELEASE ORAL DAILY
Status: DISCONTINUED | OUTPATIENT
Start: 2018-12-16 | End: 2018-12-19 | Stop reason: HOSPADM

## 2018-12-16 RX ORDER — POTASSIUM CHLORIDE 7.45 MG/ML
10 INJECTION INTRAVENOUS ONCE
Status: COMPLETED | OUTPATIENT
Start: 2018-12-16 | End: 2018-12-16

## 2018-12-16 RX ADMIN — PANTOPRAZOLE SODIUM 40 MG: 40 TABLET, DELAYED RELEASE ORAL at 17:30

## 2018-12-16 RX ADMIN — OXYCODONE HYDROCHLORIDE AND ACETAMINOPHEN 1 TABLET: 10; 325 TABLET ORAL at 17:30

## 2018-12-16 RX ADMIN — OXYCODONE HYDROCHLORIDE AND ACETAMINOPHEN 1 TABLET: 10; 325 TABLET ORAL at 07:53

## 2018-12-16 RX ADMIN — CARVEDILOL 3.12 MG: 3.12 TABLET, FILM COATED ORAL at 09:39

## 2018-12-16 RX ADMIN — Medication 10 ML: at 22:13

## 2018-12-16 RX ADMIN — OXYCODONE HYDROCHLORIDE AND ACETAMINOPHEN 1 TABLET: 10; 325 TABLET ORAL at 12:49

## 2018-12-16 RX ADMIN — AMIODARONE HYDROCHLORIDE 200 MG: 200 TABLET ORAL at 08:23

## 2018-12-16 RX ADMIN — ROSUVASTATIN CALCIUM 20 MG: 20 TABLET, FILM COATED ORAL at 22:13

## 2018-12-16 RX ADMIN — POTASSIUM CHLORIDE 10 MEQ: 7.46 INJECTION, SOLUTION INTRAVENOUS at 09:39

## 2018-12-16 RX ADMIN — AMIODARONE HYDROCHLORIDE 200 MG: 200 TABLET ORAL at 22:11

## 2018-12-16 RX ADMIN — POTASSIUM CHLORIDE 10 MEQ: 7.46 INJECTION, SOLUTION INTRAVENOUS at 08:43

## 2018-12-16 RX ADMIN — MUPIROCIN: 20 OINTMENT TOPICAL at 09:41

## 2018-12-16 RX ADMIN — OXYCODONE HYDROCHLORIDE AND ACETAMINOPHEN 1 TABLET: 10; 325 TABLET ORAL at 22:10

## 2018-12-16 RX ADMIN — LISINOPRIL 2.5 MG: 5 TABLET ORAL at 09:39

## 2018-12-16 RX ADMIN — CITALOPRAM HYDROBROMIDE 10 MG: 10 TABLET ORAL at 08:23

## 2018-12-16 RX ADMIN — MUPIROCIN: 20 OINTMENT TOPICAL at 22:20

## 2018-12-16 RX ADMIN — OXYCODONE HYDROCHLORIDE AND ACETAMINOPHEN 1 TABLET: 10; 325 TABLET ORAL at 02:30

## 2018-12-16 RX ADMIN — Medication 10 ML: at 17:31

## 2018-12-16 RX ADMIN — Medication 5 ML: at 06:00

## 2018-12-16 NOTE — PROGRESS NOTES
Progress Note    Patient: Wade Bond MRN: [id number]  SSN: [id number]    YOB: 1955  Age: 70 y.o. Sex: female      Admit Date: 12/14/2018    LOS: 2 days    POD 2 s/p CORONARY ARTERY BYPASS GRAFT X  3 , LIMA to the LAD, sequential SVG to ramus and to OM  VEIN HARVEST GREATER SAPHENOUS  ESOPHAGEAL TRANS ECHOCARDIOGRAM; s/p PPM placement / ICD      Subjective:     Required PPM / ICD postop for complete heart block (intraop arrest). No issues postop except for marginal BP. Objective:     Vitals:    12/16/18 0345 12/16/18 0400 12/16/18 0415 12/16/18 0430   BP: 105/51 103/51 (!) 87/52 108/52   Pulse: 88 87 85 90   Resp: 15 14 12 12   Temp:       SpO2: 100% 100% 100% 97%   Weight:       Height:              Intake and Output:  Current Shift: No intake/output data recorded. Chest tube drainage in 24 hrs: 423  Last three shifts: 12/14 0701 - 12/15 1900  In: 6231.5 [P.O.:800; I.V.:4871.5]  Out: 5518 [Urine:7010]  Last 3 Recorded Weights in this Encounter    12/14/18 0617 12/15/18 0624   Weight: 62.6 kg (138 lb 1 oz) 70.2 kg (154 lb 12.2 oz)       Intake/Output Summary (Last 24 hours) at 12/16/2018 0611  Last data filed at 12/15/2018 1900  Gross per 24 hour   Intake 1874.67 ml   Output 735 ml   Net 1139.67 ml     Date 12/15/18 0700 - 12/16/18 0659 12/16/18 0700 - 12/17/18 0659   Shift 9763-7764 1622-6186 24 Hour Total 7597-5078 1858-0727 24 Hour Total   INTAKE   P.O. 560  560        P. O. 560  560      I. V.(mL/kg/hr) 518.6(0.6)  518. 6        Phenylephrine Volume 231.1  231.1        Volume (dextrose 5% - 0.45% NaCl with KCl 20 mEq/L infusion) 287.5  287.5      Shift Total(mL/kg) 9610.3(48.7)  9468.7(16.5)      OUTPUT   Urine(mL/kg/hr) 660(0.8) 75 735        Urine Output (mL) (Urinary Catheter 12/14/18 2- way; Mcnulty - Temperature) 660 75 735      Shift Total(mL/kg) 660(9.4) 75(1.1) 735(10.5)      .6 -75 343.6      Weight (kg) 70.2 70.2 70.2 70.2 70.2 70.2         Physical Exam:   Neuro: Awake and alert. Nonfocal.  Neck:  No JVD  Chest:  Incision clean; sternum intact  Cor:  rrr without murmur, rub, or gallop. Lungs:  Clear to auscultation  Ext:  Incisions clean. Min edema. Lab/Data Review:  BMP:   No results found for: NA, K, CL, CO2, AGAP, GLU, BUN, CREA, GFRAA, GFRNA  CMP:   No results found for: NA, K, CL, CO2, AGAP, GLU, BUN, CREA, GFRAA, GFRNA, CA, MG, PHOS, ALB, TBIL, TP, ALB, GLOB, AGRAT, SGOT, ALT, GPT  CBC:   Lab Results   Component Value Date/Time    WBC 7.8 12/16/2018 04:22 AM    HGB 8.4 (L) 12/16/2018 04:22 AM    HCT 26.9 (L) 12/16/2018 04:22 AM    PLT 88 (L) 12/16/2018 04:22 AM     All Cardiac Markers in the last 24 hours: No results found for: CPK, CK, CKMMB, CKMB, RCK3, CKMBT, CKNDX, CKND1, MICHELLE, TROPT, TROIQ, LAKEISHA, TROPT, TNIPOC, BNP, BNPP  Recent Glucose Results:   No results found for: GLU  ABG:   No results found for: PH, PHI, PCO2, PCO2I, PO2, PO2I, HCO3, HCO3I, FIO2, FIO2I  COAGS:   No results found for: APTT, PTP, INR  Liver Panel: No results found for: ALB, CBIL, TBIL, TP, GLOB, AGRAT, SGOT, ASTPOC, ALTPOC, ALT, GPT, AP         Assessment:     The patient is presently POD #1 from a CABG x 3, PPM/ICD. Overall, patient is doing well. Major issues: Borderline BP, off can. Active Problems:    CAD Nonobstructive 10-20% LAD on CTA Coronary (4/1/2015)      Encounter for weaning from ventilator ([address] (12/14/2018)      Hypoxia (12/14/2018)      S/P CABG x 4 (12/14/2018)        Plan:       Cardiac: Stable. Hold beta blocker for marginal BP. Respiratory: Wean oxygen as tolerated. Renal: Stable. GI: Advance as tolerated. Neuro:  Stable. Wires: In.    Tubes: Out per protocol. Disposition:  No stepdown beds available. Likely to stepdown tomorrow when bed is available.         Signed By: Irene Mcfarland MD     December 16, 2018

## 2018-12-16 NOTE — PROGRESS NOTES
Critical Care Daily Progress Note: 12/16/2018    Rivera Salgado   Admission Date: 12/14/2018         The patient's chart is reviewed and the patient is discussed with the staff. 70 y old WF s//p CABG  X 4    Required PPM / ICD postop for complete heart block (intraop arrest).            Subjective:   Feels better today  Off pressors    Current Facility-Administered Medications   Medication Dose Route Frequency    oxyCODONE-acetaminophen (PERCOCET 10)  mg per tablet 1 Tab  1 Tab Oral Q4H PRN    traMADol (ULTRAM) tablet 100 mg  100 mg Oral Q6H PRN    aspirin delayed-release tablet 81 mg  81 mg Oral QHS    citalopram (CELEXA) tablet 10 mg  10 mg Oral DAILY    rosuvastatin (CRESTOR) tablet 20 mg  20 mg Oral QHS    0.9% sodium chloride infusion  25 mL/hr IntraVENous CONTINUOUS    dextrose 5% - 0.45% NaCl with KCl 20 mEq/L infusion  25 mL/hr IntraVENous CONTINUOUS    sodium chloride (NS) flush 5-10 mL  5-10 mL IntraVENous Q8H    sodium chloride (NS) flush 5-10 mL  5-10 mL IntraVENous PRN    morphine 10 mg/ml injection 3-5 mg  3-5 mg IntraVENous Q1H PRN    naloxone (NARCAN) injection 0.4 mg  0.4 mg IntraVENous PRN    mupirocin (BACTROBAN) 2 % ointment   Both Nostrils BID    sodium bicarbonate 8.4 % (1 mEq/mL) injection 50 mEq  50 mEq IntraVENous PRN    EPINEPHrine (ADRENALIN) 4 mg in 0.9% sodium chloride 250 mL infusion  0.05-0.1 mcg/kg/min IntraVENous TITRATE    nitroglycerin (Tridil) 200 mcg/ml infusion   mcg/min IntraVENous TITRATE    PHENYLephrine (RENZO-SYNEPHRINE) 30 mg in 0.9% sodium chloride 250 mL infusion   mcg/min IntraVENous TITRATE    amiodarone (CORDARONE) tablet 200 mg  200 mg Oral Q12H    ondansetron (ZOFRAN) injection 4 mg  4 mg IntraVENous Q4H PRN    insulin regular (NOVOLIN R, HUMULIN R) 100 Units in 0.9% sodium chloride 100 mL infusion  1 Units/hr IntraVENous TITRATE    dextrose (D50W) injection syrg 12.5 g  25 mL IntraVENous PRN    magnesium sulfate 1 g/100 ml IVPB (premix or compounded)  1 g IntraVENous PRN    midazolam (VERSED) injection 1 mg  1 mg IntraVENous Q1H PRN    propofol (DIPRIVAN) infusion  0-50 mcg/kg/min IntraVENous TITRATE    influenza vaccine 2018-19 (6 mos+)(PF) (FLUARIX QUAD/FLULAVAL QUAD) injection 0.5 mL  0.5 mL IntraMUSCular PRIOR TO DISCHARGE    sodium chloride (NS) flush 5-10 mL  5-10 mL IntraVENous PRN    dexmedeTOMidine (PRECEDEX) 400 mcg in 0.9% sodium chloride 100 mL infusion  0.2-0.7 mcg/kg/hr IntraVENous TITRATE       Review of Systems    Constitutional:  negative for fever, chills, sweats  Cardiovascular:  negative for chest pain, palpitations, syncope, edema  Gastrointestinal:  negative for dysphagia, reflux, vomiting, diarrhea, abdominal pain, or melena  Neurologic:  negative for focal weakness, numbness, headache      Objective:     Vitals:    12/16/18 0530 12/16/18 0545 12/16/18 0600 12/16/18 0615   BP: 111/57 108/54 115/56 110/54   Pulse: 94 91 83 84   Resp: 16 25 12 10   Temp:       SpO2: 97% 99% 100% 100%   Weight:       Height:           Intake and Output:   12/14 0701 - 12/15 1900  In: 6231.5 [P.O.:800; I.V.:4871.5]  Out: 7433 [Urine:7010]  12/15 1901 - 12/16 0700  In: -   Out: 26 [Urine:520]    Physical Exam:          Constitutional:  the patient is well developed and in no acute distress  EENMT:  Sclera clear, pupils equal, oral mucosa moist  Respiratory: clear  Cardiovascular:  RRR without M,G,R  Gastrointestinal: soft and non-tender; with positive bowel sounds. Musculoskeletal: warm without cyanosis. There is no lower leg edema.   Skin:  no jaundice or rashes, surgical wounds   Neurologic: no gross neuro deficits     Psychiatric:  alert and oriented x 3    LINES:  RIJ cordis, [place], CT     DRIPS:  none    CXR:       LAB  Recent Labs     12/16/18  0518 12/15/18  2103 12/15/18  1735 12/15/18  0627 12/15/18  0449   GLUCPOC 118* 112* 122* 91 98      Recent Labs     12/16/18  0422 12/15/18  0254 12/14/18  2300 12/14/18  1236   WBC 7.8 7.6  --   --  13.7*   HGB 8.4* 9.1* 9.0*   < > 9.3*   HCT 26.9* 28.5* 28.0*   < > 29.2*   PLT 88* 108*  --   --  138*   INR  --   --   --   --  1.5    < > = values in this interval not displayed. Recent Labs     12/16/18  0422 12/15/18  0254 12/14/18  2304  12/14/18  1236    148*  --   --  149*   K 3.5 3.8 3.6   < > 4.0   * 120*  --   --  120*   CO2 23 24  --   --  19*   * 103*  --   --  159*   BUN 12 9  --   --  14   CREA 0.43* 0.46*  --   --  0.65   MG 2.5* 2.2 2.2   < > 3.6*   CA 7.6* 6.6*  --   --  7.5*    < > = values in this interval not displayed. Recent Labs     12/14/18  2227 12/14/18  2028 12/14/18  1233   PHI 7.345* 7.279* 7.326*   PCO2I 39.9 40.0 34.1*   PO2I 108* 110* 145*   HCO3I 21.8* 18.8* 17.8*       Assessment:  (Medical Decision Making)     Hospital Problems  Date Reviewed: 11/29/2018          Codes Class Noted POA    Encounter for weaning from ventilator [facility]) resolved  ICD-10-CM: Z99.11  ICD-9-CM: V46.13  12/14/2018 Unknown        Hypoxia on 2L NC ICD-10-CM: R09.02  ICD-9-CM: 799.02  12/14/2018 Unknown        S/P CABG x 4 per surgery ICD-10-CM: Z95.1  ICD-9-CM: V45.81  12/14/2018 Unknown        CAD Nonobstructive 10-20% LAD on CTA Coronary ICD-10-CM: I25.10  ICD-9-CM: 414.00  4/1/2015 Unknown              Plan:  (Medical Decision Making)     -- wean 02  - IS, pulmonary toilet  Mobilize  -to step belkis when bed available    More than 50% of the time documented was spent in face-to-face contact with the patient and in the care of the patient on the floor/unit where the patient is located.     Griselda Manos, MD

## 2018-12-16 NOTE — PROGRESS NOTES
[facility] CARDIOLOGY PROGRESS NOTE           12/16/2018 7:15 AM    Admit Date: 12/14/2018    Admit Diagnosis: Atherosclerosis of native coronary artery of native heart with unstable angina pectoris ([facility] [address] [I25.110]    Assessment:   Active Problems:    CAD Nonobstructive 10-20% LAD on CTA Coronary (4/1/2015)      Encounter for weaning from ventilator ([address] (12/14/2018)      Hypoxia (12/14/2018)      S/P CABG x 4 (12/14/2018)        Plan:   1. Complete heart block with reduced EF - s/p SJM CRT-D with excellent results. Routine device interrogation. CXR clear of PTX and device interrogation is stable. 2. Continue GDMT, add back low dose coreg and start low dose ACEi today. 3. Post op surgical care, transfer to floor. Thank you for allowing me to participate in the electrophysiologic care of this most pleasant patient. Please feel free to contact me if there are any questions or concerns. Sherry Del Angel MD, MS  Clinical Cardiac Electrophysiology  [facility]    Subjective:   No complaints this AM, no chest pain or shortness of breath, expected post op pain. Walking around unit. Doing well. Interval History: (History of pertinent interval events obtained from nursing staff)    ROS:  GEN:  No fever or chills  Cardiovascular:  As noted above  Pulmonary:  As noted above  Neuro:  No new focal motor or sensory loss      Objective:     Vitals:    12/16/18 0645 12/16/18 0700 12/16/18 0715 12/16/18 0730   BP: 116/56 121/59 116/58 130/62   Pulse: 87 87 88 98   Resp: 11 15 15 11   Temp:       SpO2: 100% 100% 100% 95%   Weight:  153 lb 7 oz (69.6 kg)     Height:           Physical Exam:  General-Well Developed, Well Nourished, No Acute Distress. Neck- supple, no JVD  CV- regular rate and rhythm no MRG, left sided CIED C/D/I. Central SS, C/D/I. Lung- clear bilaterally  Abd- soft, nontender, nondistended  Ext- no edema bilaterally.   Skin- warm and dry    Current Facility-Administered Medications   Medication Dose Route Frequency    potassium chloride 10 mEq in 100 ml IVPB  10 mEq IntraVENous ONCE    potassium chloride 10 mEq in 100 ml IVPB  10 mEq IntraVENous ONCE    oxyCODONE-acetaminophen (PERCOCET 10)  mg per tablet 1 Tab  1 Tab Oral Q4H PRN    traMADol (ULTRAM) tablet 100 mg  100 mg Oral Q6H PRN    aspirin delayed-release tablet 81 mg  81 mg Oral QHS    citalopram (CELEXA) tablet 10 mg  10 mg Oral DAILY    rosuvastatin (CRESTOR) tablet 20 mg  20 mg Oral QHS    0.9% sodium chloride infusion  25 mL/hr IntraVENous CONTINUOUS    dextrose 5% - 0.45% NaCl with KCl 20 mEq/L infusion  25 mL/hr IntraVENous CONTINUOUS    sodium chloride (NS) flush 5-10 mL  5-10 mL IntraVENous Q8H    sodium chloride (NS) flush 5-10 mL  5-10 mL IntraVENous PRN    morphine 10 mg/ml injection 3-5 mg  3-5 mg IntraVENous Q1H PRN    naloxone (NARCAN) injection 0.4 mg  0.4 mg IntraVENous PRN    mupirocin (BACTROBAN) 2 % ointment   Both Nostrils BID    sodium bicarbonate 8.4 % (1 mEq/mL) injection 50 mEq  50 mEq IntraVENous PRN    EPINEPHrine (ADRENALIN) 4 mg in 0.9% sodium chloride 250 mL infusion  0.05-0.1 mcg/kg/min IntraVENous TITRATE    nitroglycerin (Tridil) 200 mcg/ml infusion   mcg/min IntraVENous TITRATE    PHENYLephrine (RENZO-SYNEPHRINE) 30 mg in 0.9% sodium chloride 250 mL infusion   mcg/min IntraVENous TITRATE    amiodarone (CORDARONE) tablet 200 mg  200 mg Oral Q12H    ondansetron (ZOFRAN) injection 4 mg  4 mg IntraVENous Q4H PRN    insulin regular (NOVOLIN R, HUMULIN R) 100 Units in 0.9% sodium chloride 100 mL infusion  1 Units/hr IntraVENous TITRATE    dextrose (D50W) injection syrg 12.5 g  25 mL IntraVENous PRN    magnesium sulfate 1 g/100 ml IVPB (premix or compounded)  1 g IntraVENous PRN    midazolam (VERSED) injection 1 mg  1 mg IntraVENous Q1H PRN    propofol (DIPRIVAN) infusion  0-50 mcg/kg/min IntraVENous TITRATE    influenza vaccine 2018-19 (6 mos+)(PF) (FLUARIX QUAD/FLULAVAL QUAD) injection 0.5 mL  0.5 mL IntraMUSCular PRIOR TO DISCHARGE    sodium chloride (NS) flush 5-10 mL  5-10 mL IntraVENous PRN    dexmedeTOMidine (PRECEDEX) 400 mcg in 0.9% sodium chloride 100 mL infusion  0.2-0.7 mcg/kg/hr IntraVENous TITRATE       Data Review:   Recent Results (from the past 24 hour(s))   GLUCOSE, POC    Collection Time: 12/15/18  5:35 PM   Result Value Ref Range    Glucose (POC) 122 (H) 65 - 100 mg/dL   PLEASE READ & DOCUMENT PPD TEST IN 24 HRS    Collection Time: 12/15/18  8:02 PM   Result Value Ref Range    PPD Negative Negative    mm Induration 0mm mm   GLUCOSE, POC    Collection Time: 12/15/18  9:03 PM   Result Value Ref Range    Glucose (POC) 112 (H) 65 - 100 mg/dL   CBC W/O DIFF    Collection Time: 12/16/18  4:22 AM   Result Value Ref Range    WBC 7.8 4.3 - 11.1 K/uL    RBC 2.71 (L) 4.05 - 5.2 M/uL    HGB 8.4 (L) 11.7 - 15.4 g/dL    HCT 26.9 (L) 35.8 - 46.3 %    MCV 99.3 (H) 79.6 - 97.8 FL    MCH 31.0 26.1 - 32.9 PG    MCHC 31.2 (L) 31.4 - 35.0 g/dL    RDW 13.2 11.9 - 14.6 %    PLATELET 88 (L) 792 - 450 K/uL    MPV 12.3 9.4 - 12.3 FL    ABSOLUTE NRBC 0.00 0.0 - 0.2 K/uL   METABOLIC PANEL, BASIC    Collection Time: 12/16/18  4:22 AM   Result Value Ref Range    Sodium 142 136 - 145 mmol/L    Potassium 3.5 3.5 - 5.1 mmol/L    Chloride 111 (H) 98 - 107 mmol/L    CO2 23 21 - 32 mmol/L    Anion gap 8 7 - 16 mmol/L    Glucose 106 (H) 65 - 100 mg/dL    BUN 12 8 - 23 MG/DL    Creatinine 0.43 (L) 0.6 - 1.0 MG/DL    GFR est AA >60 >60 ml/min/1.73m2    GFR est non-AA >60 >60 ml/min/1.73m2    Calcium 7.6 (L) 8.3 - 10.4 MG/DL   MAGNESIUM    Collection Time: 12/16/18  4:22 AM   Result Value Ref Range    Magnesium 2.5 (H) 1.8 - 2.4 mg/dL   GLUCOSE, POC    Collection Time: 12/16/18  5:18 AM   Result Value Ref Range    Glucose (POC) 118 (H) 65 - 100 mg/dL       EKG:  (EKG has been independently visualized by me with interpretation below): Biventricular pacing.

## 2018-12-17 ENCOUNTER — APPOINTMENT (OUTPATIENT)
Dept: GENERAL RADIOLOGY | Age: 63
DRG: 226 | End: 2018-12-17
Attending: THORACIC SURGERY (CARDIOTHORACIC VASCULAR SURGERY)
Payer: MEDICARE

## 2018-12-17 PROBLEM — I25.10 CAD (CORONARY ARTERY DISEASE): Status: ACTIVE | Noted: 2018-12-17

## 2018-12-17 LAB
ANION GAP SERPL CALC-SCNC: 5 MMOL/L (ref 7–16)
BUN SERPL-MCNC: 11 MG/DL (ref 8–23)
CALCIUM SERPL-MCNC: 7.5 MG/DL (ref 8.3–10.4)
CHLORIDE SERPL-SCNC: 108 MMOL/L (ref 98–107)
CO2 SERPL-SCNC: 26 MMOL/L (ref 21–32)
CREAT SERPL-MCNC: 0.39 MG/DL (ref 0.6–1)
ERYTHROCYTE [DISTWIDTH] IN BLOOD BY AUTOMATED COUNT: 12.7 % (ref 11.9–14.6)
GLUCOSE BLD STRIP.AUTO-MCNC: 100 MG/DL (ref 65–100)
GLUCOSE BLD STRIP.AUTO-MCNC: 124 MG/DL (ref 65–100)
GLUCOSE SERPL-MCNC: 113 MG/DL (ref 65–100)
HCT VFR BLD AUTO: 27 % (ref 35.8–46.3)
HGB BLD-MCNC: 8.6 G/DL (ref 11.7–15.4)
MAGNESIUM SERPL-MCNC: 2.1 MG/DL (ref 1.8–2.4)
MCH RBC QN AUTO: 30.9 PG (ref 26.1–32.9)
MCHC RBC AUTO-ENTMCNC: 31.9 G/DL (ref 31.4–35)
MCV RBC AUTO: 97.1 FL (ref 79.6–97.8)
MM INDURATION POC: 0 MM (ref 0–5)
NRBC # BLD: 0 K/UL (ref 0–0.2)
PLATELET # BLD AUTO: 97 K/UL (ref 150–450)
PMV BLD AUTO: 12.2 FL (ref 9.4–12.3)
POTASSIUM SERPL-SCNC: 3.9 MMOL/L (ref 3.5–5.1)
PPD POC: NORMAL NEGATIVE
RBC # BLD AUTO: 2.78 M/UL (ref 4.05–5.2)
SODIUM SERPL-SCNC: 139 MMOL/L (ref 136–145)
WBC # BLD AUTO: 7.1 K/UL (ref 4.3–11.1)

## 2018-12-17 PROCEDURE — 74011250637 HC RX REV CODE- 250/637: Performed by: THORACIC SURGERY (CARDIOTHORACIC VASCULAR SURGERY)

## 2018-12-17 PROCEDURE — 83735 ASSAY OF MAGNESIUM: CPT

## 2018-12-17 PROCEDURE — 74011250637 HC RX REV CODE- 250/637: Performed by: PHYSICIAN ASSISTANT

## 2018-12-17 PROCEDURE — 65660000004 HC RM CVT STEPDOWN

## 2018-12-17 PROCEDURE — 94760 N-INVAS EAR/PLS OXIMETRY 1: CPT

## 2018-12-17 PROCEDURE — 82962 GLUCOSE BLOOD TEST: CPT

## 2018-12-17 PROCEDURE — 71045 X-RAY EXAM CHEST 1 VIEW: CPT

## 2018-12-17 PROCEDURE — 85027 COMPLETE CBC AUTOMATED: CPT

## 2018-12-17 PROCEDURE — 77010033678 HC OXYGEN DAILY

## 2018-12-17 PROCEDURE — 99232 SBSQ HOSP IP/OBS MODERATE 35: CPT | Performed by: INTERNAL MEDICINE

## 2018-12-17 PROCEDURE — 80048 BASIC METABOLIC PNL TOTAL CA: CPT

## 2018-12-17 PROCEDURE — 36592 COLLECT BLOOD FROM PICC: CPT

## 2018-12-17 RX ORDER — POTASSIUM CHLORIDE 20 MEQ/1
20 TABLET, EXTENDED RELEASE ORAL
Status: DISCONTINUED | OUTPATIENT
Start: 2018-12-17 | End: 2018-12-19 | Stop reason: HOSPADM

## 2018-12-17 RX ORDER — POTASSIUM CHLORIDE 750 MG/1
10 TABLET, EXTENDED RELEASE ORAL DAILY
Status: DISCONTINUED | OUTPATIENT
Start: 2018-12-18 | End: 2018-12-19 | Stop reason: HOSPADM

## 2018-12-17 RX ORDER — FUROSEMIDE 40 MG/1
40 TABLET ORAL DAILY
Status: DISCONTINUED | OUTPATIENT
Start: 2018-12-18 | End: 2018-12-19 | Stop reason: HOSPADM

## 2018-12-17 RX ORDER — POTASSIUM CHLORIDE 20 MEQ/1
40 TABLET, EXTENDED RELEASE ORAL
Status: DISCONTINUED | OUTPATIENT
Start: 2018-12-17 | End: 2018-12-19 | Stop reason: HOSPADM

## 2018-12-17 RX ORDER — ADHESIVE BANDAGE
30 BANDAGE TOPICAL DAILY PRN
Status: DISCONTINUED | OUTPATIENT
Start: 2018-12-17 | End: 2018-12-19 | Stop reason: HOSPADM

## 2018-12-17 RX ORDER — LANOLIN ALCOHOL/MO/W.PET/CERES
400 CREAM (GRAM) TOPICAL
Status: DISCONTINUED | OUTPATIENT
Start: 2018-12-17 | End: 2018-12-19 | Stop reason: HOSPADM

## 2018-12-17 RX ORDER — CARVEDILOL 3.12 MG/1
3.12 TABLET ORAL 2 TIMES DAILY WITH MEALS
Status: DISCONTINUED | OUTPATIENT
Start: 2018-12-17 | End: 2018-12-19 | Stop reason: HOSPADM

## 2018-12-17 RX ORDER — MAG HYDROX/ALUMINUM HYD/SIMETH 200-200-20
30 SUSPENSION, ORAL (FINAL DOSE FORM) ORAL
Status: DISCONTINUED | OUTPATIENT
Start: 2018-12-17 | End: 2018-12-19 | Stop reason: HOSPADM

## 2018-12-17 RX ORDER — ACETAMINOPHEN 325 MG/1
650 TABLET ORAL
Status: DISCONTINUED | OUTPATIENT
Start: 2018-12-17 | End: 2018-12-19 | Stop reason: HOSPADM

## 2018-12-17 RX ORDER — POTASSIUM CHLORIDE 750 MG/1
10 TABLET, EXTENDED RELEASE ORAL
Status: COMPLETED | OUTPATIENT
Start: 2018-12-17 | End: 2018-12-17

## 2018-12-17 RX ORDER — LISINOPRIL 5 MG/1
2.5 TABLET ORAL DAILY
Status: DISCONTINUED | OUTPATIENT
Start: 2018-12-18 | End: 2018-12-19 | Stop reason: HOSPADM

## 2018-12-17 RX ORDER — AMOXICILLIN 250 MG
2 CAPSULE ORAL EVERY 12 HOURS
Status: DISCONTINUED | OUTPATIENT
Start: 2018-12-17 | End: 2018-12-19

## 2018-12-17 RX ORDER — FAMOTIDINE 20 MG/1
20 TABLET, FILM COATED ORAL 2 TIMES DAILY
Status: DISCONTINUED | OUTPATIENT
Start: 2018-12-17 | End: 2018-12-19 | Stop reason: HOSPADM

## 2018-12-17 RX ADMIN — CARVEDILOL 3.12 MG: 3.12 TABLET, FILM COATED ORAL at 17:41

## 2018-12-17 RX ADMIN — PANTOPRAZOLE SODIUM 40 MG: 40 TABLET, DELAYED RELEASE ORAL at 08:33

## 2018-12-17 RX ADMIN — CITALOPRAM HYDROBROMIDE 10 MG: 10 TABLET ORAL at 08:33

## 2018-12-17 RX ADMIN — POTASSIUM CHLORIDE 10 MEQ: 10 TABLET, EXTENDED RELEASE ORAL at 08:56

## 2018-12-17 RX ADMIN — OXYCODONE HYDROCHLORIDE AND ACETAMINOPHEN 1 TABLET: 10; 325 TABLET ORAL at 16:16

## 2018-12-17 RX ADMIN — TRAMADOL HYDROCHLORIDE 100 MG: 50 TABLET, FILM COATED ORAL at 16:11

## 2018-12-17 RX ADMIN — Medication 10 ML: at 20:26

## 2018-12-17 RX ADMIN — OXYCODONE HYDROCHLORIDE AND ACETAMINOPHEN 1 TABLET: 10; 325 TABLET ORAL at 20:23

## 2018-12-17 RX ADMIN — OXYCODONE HYDROCHLORIDE AND ACETAMINOPHEN 1 TABLET: 10; 325 TABLET ORAL at 12:15

## 2018-12-17 RX ADMIN — FAMOTIDINE 20 MG: 20 TABLET ORAL at 17:41

## 2018-12-17 RX ADMIN — OXYCODONE HYDROCHLORIDE AND ACETAMINOPHEN 1 TABLET: 10; 325 TABLET ORAL at 03:03

## 2018-12-17 RX ADMIN — Medication 10 ML: at 08:34

## 2018-12-17 RX ADMIN — STANDARDIZED SENNA CONCENTRATE AND DOCUSATE SODIUM 2 TABLET: 8.6; 5 TABLET, FILM COATED ORAL at 20:23

## 2018-12-17 RX ADMIN — Medication 10 ML: at 16:19

## 2018-12-17 RX ADMIN — AMIODARONE HYDROCHLORIDE 200 MG: 200 TABLET ORAL at 20:23

## 2018-12-17 RX ADMIN — ROSUVASTATIN CALCIUM 20 MG: 20 TABLET, FILM COATED ORAL at 20:23

## 2018-12-17 RX ADMIN — MUPIROCIN: 20 OINTMENT TOPICAL at 20:24

## 2018-12-17 RX ADMIN — MUPIROCIN: 20 OINTMENT TOPICAL at 08:34

## 2018-12-17 RX ADMIN — AMIODARONE HYDROCHLORIDE 200 MG: 200 TABLET ORAL at 08:33

## 2018-12-17 RX ADMIN — OXYCODONE HYDROCHLORIDE AND ACETAMINOPHEN 1 TABLET: 10; 325 TABLET ORAL at 06:54

## 2018-12-17 NOTE — PROGRESS NOTES
TRANSFER - OUT REPORT:    Verbal report given to Masha Koch on [facility]  being transferred to  Stepdown for routine progression of care       Report consisted of patients Situation, Background, Assessment and   Recommendations(SBAR). Information from the following report(s) SBAR, OR Summary, Intake/Output, MAR, Recent Results, Med Rec Status and Cardiac Rhythm Paced was reviewed with the receiving nurse. Lines:   Peripheral IV 12/14/18 Right; Lower Arm (Active)   Site Assessment Clean, dry, & intact 12/17/2018 11:01 AM   Phlebitis Assessment 0 12/17/2018 11:01 AM   Infiltration Assessment 0 12/17/2018 11:01 AM   Dressing Status Clean, dry, & intact 12/17/2018 11:01 AM   Dressing Type Transparent;Tape 12/17/2018 11:01 AM   Hub Color/Line Status Green;Flushed 12/17/2018 11:01 AM   Action Taken Open ports on tubing capped 12/17/2018  3:07 AM        Opportunity for questions and clarification was provided.       Patient transported with:   Monitor  O2 @ 2 liters

## 2018-12-17 NOTE — PROGRESS NOTES
Pt seen in CVICU s/p CABG with DR. Gee Mckeon. Alert and oriented with spouse at bedside. Confirms all demographics and insurance. Independent prior to admission of ADL's and driving self. Discussed possible needs for d/c of HH vs STR. Pt has friend coming to stay with her at d/c per pt and spouse. They agree with POC for HH at d/c. CM to follow and assist with any further d/c needs and HH. Care Management Interventions  PCP Verified by CM: Yes(confirms Dr. Jacqueline Brown)  Mode of Transport at Discharge:  Other (see comment)  Transition of Care Consult (CM Consult): Discharge Planning  Discharge Durable Medical Equipment: (none)  Current Support Network: Lives with Spouse, Own Home(lives with spouse, Rebeca Giordano -[phone])  Confirm Follow Up Transport: Self(driving self prior to admission)  Plan discussed with Pt/Family/Caregiver: Yes  Freedom of Choice Offered: Yes  [employer] Information Provided?: (confirms MCR/ supplemental - able to get rx with drug plan)  Discharge Location  Discharge Placement: Home with home health

## 2018-12-17 NOTE — PROGRESS NOTES
Critical Care Daily Progress Note: 12/17/2018    Juan King   Admission Date: 12/14/2018         The patient's chart is reviewed and the patient is discussed with the staff. 70 y old WF s//p CABG  X 4    Required PPM / ICD postop for complete heart block (intraop arrest). Subjective: Weaned to RA. VS stable. No complaints except expected post op pain. Up to chair and ambulated earlier.      Current Facility-Administered Medications   Medication Dose Route Frequency    carvedilol (COREG) tablet 3.125 mg  3.125 mg Oral BID WITH MEALS    lisinopril (PRINIVIL, ZESTRIL) tablet 2.5 mg  2.5 mg Oral DAILY    lip protectant (BLISTEX) ointment   Topical PRN    pantoprazole (PROTONIX) tablet 40 mg  40 mg Oral DAILY    oxyCODONE-acetaminophen (PERCOCET 10)  mg per tablet 1 Tab  1 Tab Oral Q4H PRN    traMADol (ULTRAM) tablet 100 mg  100 mg Oral Q6H PRN    aspirin delayed-release tablet 81 mg  81 mg Oral QHS    citalopram (CELEXA) tablet 10 mg  10 mg Oral DAILY    rosuvastatin (CRESTOR) tablet 20 mg  20 mg Oral QHS    0.9% sodium chloride infusion  25 mL/hr IntraVENous CONTINUOUS    dextrose 5% - 0.45% NaCl with KCl 20 mEq/L infusion  25 mL/hr IntraVENous CONTINUOUS    sodium chloride (NS) flush 5-10 mL  5-10 mL IntraVENous Q8H    sodium chloride (NS) flush 5-10 mL  5-10 mL IntraVENous PRN    morphine 10 mg/ml injection 3-5 mg  3-5 mg IntraVENous Q1H PRN    naloxone (NARCAN) injection 0.4 mg  0.4 mg IntraVENous PRN    mupirocin (BACTROBAN) 2 % ointment   Both Nostrils BID    sodium bicarbonate 8.4 % (1 mEq/mL) injection 50 mEq  50 mEq IntraVENous PRN    EPINEPHrine (ADRENALIN) 4 mg in 0.9% sodium chloride 250 mL infusion  0.05-0.1 mcg/kg/min IntraVENous TITRATE    nitroglycerin (Tridil) 200 mcg/ml infusion   mcg/min IntraVENous TITRATE    PHENYLephrine (RENZO-SYNEPHRINE) 30 mg in 0.9% sodium chloride 250 mL infusion   mcg/min IntraVENous TITRATE    amiodarone (CORDARONE) tablet 200 mg  200 mg Oral Q12H    ondansetron (ZOFRAN) injection 4 mg  4 mg IntraVENous Q4H PRN    insulin regular (NOVOLIN R, HUMULIN R) 100 Units in 0.9% sodium chloride 100 mL infusion  1 Units/hr IntraVENous TITRATE    dextrose (D50W) injection syrg 12.5 g  25 mL IntraVENous PRN    magnesium sulfate 1 g/100 ml IVPB (premix or compounded)  1 g IntraVENous PRN    midazolam (VERSED) injection 1 mg  1 mg IntraVENous Q1H PRN    propofol (DIPRIVAN) infusion  0-50 mcg/kg/min IntraVENous TITRATE    influenza vaccine 2018-19 (6 mos+)(PF) (FLUARIX QUAD/FLULAVAL QUAD) injection 0.5 mL  0.5 mL IntraMUSCular PRIOR TO DISCHARGE    sodium chloride (NS) flush 5-10 mL  5-10 mL IntraVENous PRN    dexmedeTOMidine (PRECEDEX) 400 mcg in 0.9% sodium chloride 100 mL infusion  0.2-0.7 mcg/kg/hr IntraVENous TITRATE       Review of Systems    Constitutional:  negative for fever, chills, sweats  Cardiovascular:  negative for chest pain, palpitations, syncope, edema  Gastrointestinal:  negative for dysphagia, reflux, vomiting, diarrhea, abdominal pain, or melena  Neurologic:  negative for focal weakness, numbness, headache      Objective:     Vitals:    12/17/18 0530 12/17/18 0701 12/17/18 0731 12/17/18 0833   BP: 103/53 117/58 119/58 95/52   Pulse: 87 92 99 97   Resp: 20 11 14    Temp:       SpO2: 99% 96% 94%    Weight: 150 lb 5.7 oz (68.2 kg)      Height:           Intake and Output:   12/15 1901 - 12/17 0700  In: 720 [P.O.:720]  Out: 570 [Urine:570]  No intake/output data recorded. Physical Exam:          Constitutional:  the patient is well developed and in no acute distress  EENMT:  Sclera clear, pupils equal, oral mucosa moist  Respiratory: clear  Cardiovascular:  RRR without M,G,R  Gastrointestinal: soft and non-tender; with positive bowel sounds. Musculoskeletal: warm without cyanosis. There is no lower leg edema.   Skin:  no jaundice or rashes, surgical wounds   Neurologic: no gross neuro deficits     Psychiatric:  alert and oriented x 3    LINES:  KRAIG tao, Pric[place], CT     DRIPS:  none    CXR:    12/17:          12/16:         LAB  Recent Labs     12/16/18  0518 12/15/18  2103 12/15/18  1735 12/15/18  0627 12/15/18  0449   GLUCPOC 118* 112* 122* 91 98      Recent Labs     12/17/18  0349 12/16/18  0422 12/15/18  0254  12/14/18  1236   WBC 7.1 7.8 7.6  --  13.7*   HGB 8.6* 8.4* 9.1*   < > 9.3*   HCT 27.0* 26.9* 28.5*   < > 29.2*   PLT 97* 88* 108*  --  138*   INR  --   --   --   --  1.5    < > = values in this interval not displayed. Recent Labs     12/17/18  0349 12/16/18  0422 12/15/18  0254    142 148*   K 3.9 3.5 3.8   * 111* 120*   CO2 26 23 24   * 106* 103*   BUN 11 12 9   CREA 0.39* 0.43* 0.46*   MG 2.1 2.5* 2.2   CA 7.5* 7.6* 6.6*     Recent Labs     12/14/18  2227 12/14/18  2028 12/14/18  1233   PHI 7.345* 7.279* 7.326*   PCO2I 39.9 40.0 34.1*   PO2I 108* 110* 145*   HCO3I 21.8* 18.8* 17.8*       Assessment:  (Medical Decision Making)     Hospital Problems  Date Reviewed: 11/29/2018          Codes Class Noted POA    Encounter for weaning from ventilator [facility])  ICD-10-CM: Z99.11  ICD-9-CM: V46.13  12/14/2018 Unknown    Now off vent and on RA    Hypoxia  ICD-10-CM: R09.02  ICD-9-CM: 799.02  12/14/2018 Unknown    Resolved    S/P CABG x 4 per surgery ICD-10-CM: Z95.1  ICD-9-CM: V45.81  12/14/2018 Unknown        CAD Nonobstructive 10-20% LAD on CTA Coronary ICD-10-CM: I25.10  ICD-9-CM: 414.00  4/1/2015 Unknown              Plan:  (Medical Decision Making)     Move to step down unit when bed available. Increase activity. Call if further assistance needed. More than 50% of the time documented was spent in face-to-face contact with the patient and in the care of the patient on the floor/unit where the patient is located.     Hortencia Bennett MD

## 2018-12-17 NOTE — PROGRESS NOTES
Today's Date: 12/17/2018  Date of Admission: 12/14/2018    Chart Reviewed. Subjective:     Patient feels ok. She feels drowsy. Appetite ok. Medications Reviewed. Objective:     Vitals:    12/17/18 0931 12/17/18 1001 12/17/18 1031 12/17/18 1101   BP: 100/57 105/55 114/57 111/58   Pulse: 97 96 96 95   Resp: 15 21 17 18   Temp:       SpO2: 94% 96% 94% 95%   Weight:       Height:           Intake and Output  Current Shift: 12/17 0701 - 12/17 1900  In: 240 [P.O.:240]  Out: 350 [Urine:350]   Last 3 Shifts: 12/15 1901 - 12/17 0700  In: 720 [P.O.:720]  Out: 570 [Urine:570]    Physical Exam:  General: Well Developed, Well Nourished, No Acute Distress, Alert & Oriented x 3, Appropriate mood  Neck: supple, no JVD  Heart: S1S2 with RRR without murmurs or gallops  Lungs: Clear throughout auscultation bilaterally without adventitious sounds  Abd: soft, nontender, nondistended, with good bowel sounds  Ext: no edema bilaterally  Sternal incision: clean, dry, and intact  Skin: warm and dry    LABS  Data Review:   Recent Labs     12/17/18  0349 12/16/18  0422  12/14/18  1236    142   < > 149*   K 3.9 3.5   < > 4.0   MG 2.1 2.5*   < > 3.6*   BUN 11 12   < > 14   CREA 0.39* 0.43*   < > 0.65   * 106*   < > 159*   WBC 7.1 7.8   < > 13.7*   HGB 8.6* 8.4*   < > 9.3*   HCT 27.0* 26.9*   < > 29.2*   PLT 97* 88*   < > 138*   INR  --   --   --  1.5    < > = values in this interval not displayed. Estimated Creatinine Clearance: 134.6 mL/min (A) (based on SCr of 0.39 mg/dL (L)).       Assessment/Plan:     Principal Problem:    S/P CABG x 4 (12/14/2018)  Continue ASA, BB, ACE-I, statin, continue PT, transfer to stepdown, will remove pacing wires, s/p BiV ICD    Active Problems:    Encounter for weaning from ventilator ([facility] [address] (12/14/2018)        Hypoxia (12/14/2018)  Resolved, stable on room air    Systolic CHF  Continue Coreg, lisinopril as BP tolerates, ICD placed     CAD  As above, continue medical therapy Rinku Solis PA-C

## 2018-12-17 NOTE — PROGRESS NOTES
Bedside shift change report received from [facility] at this time.

## 2018-12-17 NOTE — PROGRESS NOTES
R IJ cordis removed with no complications. Occlusive dressing placed over cordis site after hemostasis achieved. VSS. Will continue to monitor.

## 2018-12-18 PROBLEM — I44.2 COMPLETE HEART BLOCK (HCC): Status: ACTIVE | Noted: 2018-12-18

## 2018-12-18 PROBLEM — I50.20 SYSTOLIC CONGESTIVE HEART FAILURE (HCC): Status: ACTIVE | Noted: 2018-12-18

## 2018-12-18 LAB
ERYTHROCYTE [DISTWIDTH] IN BLOOD BY AUTOMATED COUNT: 12.6 % (ref 11.9–14.6)
HCT VFR BLD AUTO: 29 % (ref 35.8–46.3)
HGB BLD-MCNC: 9.2 G/DL (ref 11.7–15.4)
MAGNESIUM SERPL-MCNC: 2.1 MG/DL (ref 1.8–2.4)
MCH RBC QN AUTO: 30.4 PG (ref 26.1–32.9)
MCHC RBC AUTO-ENTMCNC: 31.7 G/DL (ref 31.4–35)
MCV RBC AUTO: 95.7 FL (ref 79.6–97.8)
NRBC # BLD: 0 K/UL (ref 0–0.2)
PLATELET # BLD AUTO: 133 K/UL (ref 150–450)
PMV BLD AUTO: 11.9 FL (ref 9.4–12.3)
POTASSIUM SERPL-SCNC: 3.7 MMOL/L (ref 3.5–5.1)
RBC # BLD AUTO: 3.03 M/UL (ref 4.05–5.2)
WBC # BLD AUTO: 7 K/UL (ref 4.3–11.1)

## 2018-12-18 PROCEDURE — 97110 THERAPEUTIC EXERCISES: CPT

## 2018-12-18 PROCEDURE — 84132 ASSAY OF SERUM POTASSIUM: CPT

## 2018-12-18 PROCEDURE — 74011250637 HC RX REV CODE- 250/637: Performed by: THORACIC SURGERY (CARDIOTHORACIC VASCULAR SURGERY)

## 2018-12-18 PROCEDURE — 83735 ASSAY OF MAGNESIUM: CPT

## 2018-12-18 PROCEDURE — 94760 N-INVAS EAR/PLS OXIMETRY 1: CPT

## 2018-12-18 PROCEDURE — 97530 THERAPEUTIC ACTIVITIES: CPT

## 2018-12-18 PROCEDURE — 85027 COMPLETE CBC AUTOMATED: CPT

## 2018-12-18 PROCEDURE — 97161 PT EVAL LOW COMPLEX 20 MIN: CPT

## 2018-12-18 PROCEDURE — 74011250637 HC RX REV CODE- 250/637: Performed by: PHYSICIAN ASSISTANT

## 2018-12-18 PROCEDURE — 36415 COLL VENOUS BLD VENIPUNCTURE: CPT

## 2018-12-18 PROCEDURE — 65660000004 HC RM CVT STEPDOWN

## 2018-12-18 RX ORDER — OXYCODONE AND ACETAMINOPHEN 5; 325 MG/1; MG/1
1 TABLET ORAL
Status: DISCONTINUED | OUTPATIENT
Start: 2018-12-18 | End: 2018-12-19 | Stop reason: HOSPADM

## 2018-12-18 RX ADMIN — OXYCODONE HYDROCHLORIDE AND ACETAMINOPHEN 1 TABLET: 5; 325 TABLET ORAL at 09:23

## 2018-12-18 RX ADMIN — CITALOPRAM HYDROBROMIDE 10 MG: 10 TABLET ORAL at 09:12

## 2018-12-18 RX ADMIN — OXYCODONE HYDROCHLORIDE AND ACETAMINOPHEN 1 TABLET: 10; 325 TABLET ORAL at 03:02

## 2018-12-18 RX ADMIN — Medication 10 ML: at 21:09

## 2018-12-18 RX ADMIN — FUROSEMIDE 40 MG: 40 TABLET ORAL at 09:13

## 2018-12-18 RX ADMIN — CARVEDILOL 3.12 MG: 3.12 TABLET, FILM COATED ORAL at 17:34

## 2018-12-18 RX ADMIN — POTASSIUM CHLORIDE 20 MEQ: 20 TABLET, EXTENDED RELEASE ORAL at 17:34

## 2018-12-18 RX ADMIN — MUPIROCIN: 20 OINTMENT TOPICAL at 09:14

## 2018-12-18 RX ADMIN — POTASSIUM CHLORIDE 10 MEQ: 20 TABLET, EXTENDED RELEASE ORAL at 09:13

## 2018-12-18 RX ADMIN — Medication 10 ML: at 05:01

## 2018-12-18 RX ADMIN — FAMOTIDINE 20 MG: 20 TABLET ORAL at 17:34

## 2018-12-18 RX ADMIN — OXYCODONE HYDROCHLORIDE AND ACETAMINOPHEN 1 TABLET: 5; 325 TABLET ORAL at 15:58

## 2018-12-18 RX ADMIN — OXYCODONE HYDROCHLORIDE AND ACETAMINOPHEN 1 TABLET: 5; 325 TABLET ORAL at 19:59

## 2018-12-18 RX ADMIN — ROSUVASTATIN CALCIUM 20 MG: 20 TABLET, FILM COATED ORAL at 21:09

## 2018-12-18 RX ADMIN — STANDARDIZED SENNA CONCENTRATE AND DOCUSATE SODIUM 2 TABLET: 8.6; 5 TABLET, FILM COATED ORAL at 09:13

## 2018-12-18 RX ADMIN — Medication 10 ML: at 13:44

## 2018-12-18 RX ADMIN — PANTOPRAZOLE SODIUM 40 MG: 40 TABLET, DELAYED RELEASE ORAL at 05:01

## 2018-12-18 RX ADMIN — POTASSIUM CHLORIDE 20 MEQ: 20 TABLET, EXTENDED RELEASE ORAL at 09:14

## 2018-12-18 RX ADMIN — CARVEDILOL 3.12 MG: 3.12 TABLET, FILM COATED ORAL at 09:12

## 2018-12-18 RX ADMIN — LISINOPRIL 2.5 MG: 5 TABLET ORAL at 09:13

## 2018-12-18 RX ADMIN — STANDARDIZED SENNA CONCENTRATE AND DOCUSATE SODIUM 2 TABLET: 8.6; 5 TABLET, FILM COATED ORAL at 21:08

## 2018-12-18 RX ADMIN — AMIODARONE HYDROCHLORIDE 200 MG: 200 TABLET ORAL at 21:09

## 2018-12-18 RX ADMIN — FAMOTIDINE 20 MG: 20 TABLET ORAL at 09:13

## 2018-12-18 RX ADMIN — ASPIRIN 81 MG: 81 TABLET, COATED ORAL at 21:09

## 2018-12-18 RX ADMIN — AMIODARONE HYDROCHLORIDE 200 MG: 200 TABLET ORAL at 09:13

## 2018-12-18 NOTE — OP NOTES
[facility] REPORT    Nafisa Hinds  MR#: [id number]  : 1955  ACCOUNT #: [de-identified]   DATE OF SERVICE: 2018    PREOPERATIVE DIAGNOSIS:  Coronary artery occlusive disease. POSTOPERATIVE DIAGNOSIS:  Coronary artery occlusive disease. PROCEDURE PERFORMED:  Three-vessel coronary artery bypass grafting using a sequential saphenous vein graft to the intermediate and the obtuse marginal coronary, left internal mammary artery to the left anterior descending coronary; (arterial line and [place]-Jessica catheter placed by anesthesia); temporary right ventricular pacing wire. ANESTHESIA:  General.    CARDIOPULMONARY BYPASS TIME:  76 minutes. AORTIC CROSSCLAMP TIME:  57 minutes. SURGEON:  Alison Perez MD    ASSISTANT:   .     ANESTHESIA:  General endotracheal.     ESTIMATED BLOOD LOSS:  minimal.    SPECIMENS REMOVED:   .    COMPLICATIONS:   .    IMPLANTS:   .      PREOPERATIVE HISTORY:  This is a 76-year-old lady who had developed exertional chest pain. Her stress test was markedly abnormal, when she was evaluated for preoperative general surgery, a cardiac catheterization showing multivessel disease. Surgical revascularization was recommended. WHAT WAS FOUND AND WHAT WAS DONE:  The heart was exposed through median sternotomy. It should be stated that on induction, the patient had a short episode of asystole and then after conduit had been obtained, on touching the pericardium, she again had asystole. At this time, temporary pacing wires were placed and the patient was paced until being placed on cardiopulmonary bypass. Three coronaries were grafted, placing a sequential vein graft to the intermediate and the obtuse marginal coronary. The internal mammary artery was used to bypass the mid left anterior descending coronary. The patient came off bypass, but again was paced with multiple temporary right ventricular pacing wires.     PROCEDURE IN DETAIL:  The patient was premedicated and brought to the operating room. The patient was placed supine on the table. Appropriate monitoring lines were placed, including a [place]-Jessica catheter, radial artery catheter. General endotracheal anesthesia was given. The anterior body and both legs were prepped with Betadine. The patient was draped into the sterile field. It should be stated that with induction, the patient had a short burst, a short episode of asystole, which was corrected with medication. With the patient prepped and draped, saphenous vein was removed from the lower leg. The vein was prepared and the skin of the leg was closed with subcuticular closure technique. The chest was opened in the midline. A sternotomy was carried out and the left pleural cavity was opened widely. Mammary artery was taken down. Once again, on touching the pericardium, the patient again had an episode of asystole. At this time, temporary alligator clips were placed to the right ventricle and we had temporary pacing from this point on. Heparin at 3 300 units per kilogram was given. The patient was cannulated and was then placed on bypass. The aorta was crossclamped, blood cardioplegia was given antegrade. The circumflex marginal coronary was open for a 5 mm length and reverse vein was sutured to this vessel with 7-0 Prolene. Adjacent to this was the intermediate coronary. This vessel was opened for a 5 mm length and using the same vein segment, a side-to-side anastomosis was carried out. Next, the internal mammary artery was sutured to the mid left anterior descending coronary with a running 7-0 Prolene, and then finally a 4 mm button of aortic wall was removed and the proximal end of the vein graft was sutured directly to the aorta with a 6-0 Prolene. The patient was then rewarmed to 37 degrees centigrade.   She was weaned from bypass with the help of ventricular pacing after multiple pacing wires had been established. At this point, cannulas were removed and the pursestring sutures tied. Protamine was given to reverse the heparin. Hemostasis was satisfactory. The sternum was reapproximated with interrupted stainless steel wires. Soft tissues closed with Vicryl and the skin was closed with Vicryl using a subcuticular closure technique. The patient tolerated the procedure well, moved to intensive care in a stable condition. Sponge, needle, and instrument counts were correct.       MD LANA Lou / TN  D: 12/18/2018 09:33     T: 12/18/2018 10:13  JOB #: [id number]  CC: Keli Hung MD

## 2018-12-18 NOTE — PROGRESS NOTES
Cardiac Rehab: Spoke with patient regarding referral to cardiac rehab. Patient meets admission criteria based on CABG x3 (12/14/18). Written information about Cardiac Rehab given and reviewed with patient. Discussed lifestyle modifications to promote cardiac wellness. Patient indicated that she  wants to participate in the cardiac rehab program and her orientation has been scheduled. Her Cardiologist is Dr. Austin Montgomery.       Thank you,  Ana Rolle BSN, RN  Cardiopulmonary Rehabilitation Nurse Liaison  [facility]

## 2018-12-18 NOTE — PROGRESS NOTES
Problem: Mobility Impaired (Adult and Pediatric)  Goal: *Acute Goals and Plan of Care (Insert Text)  LTG:  (1.)Ms. Immanuel Stanton will move from supine to sit and sit to supine , scoot up and down and roll side to side in flat bed without siderails with  INDEPENDENT within 7 day(s). (2.)Ms. Immanuel Stanton will perform all functional transfers with  INDEPENDENT using the least restrictive/no device within 7 day(s). (3.)Ms. Immanuel Stanton will ambulate with  INDEPENDENT for 500+ feet with normal vital sign response with the least restrictive/no device within 7 day(s). (4.)Ms. Immanuel Stanton will ambulate up/down 2 steps with bilateral  railing with  MODIFIED INDEPENDENCE with no device within 7 day(s). PHYSICAL THERAPY: Daily Note, Treatment Day: Day of Assessment, PM 12/18/2018  INPATIENT: Hospital Day: 5  [place] / [place] / Product Type: Medicare /   ICD precautions L UE   NAME/AGE/GENDER: Jeevan Quinonez is a 70 y.o. female   PRIMARY DIAGNOSIS: Atherosclerosis of native coronary artery of native heart with unstable angina pectoris (HCC) [I25.110] S/P CABG x 4 S/P CABG x 4  Procedure(s) (LRB):  ICD (N/A)  4 Days Post-Op  ICD-10: Treatment Diagnosis:    · Other abnormalities of gait and mobility (R26.89)   Precaution/Allergies:  Codeine; Cenestin [synthetic conj estrogens a]; Doxycycline; Levaquin [levofloxacin]; Pcn [penicillins]; and Sulfa dyne      ASSESSMENT:     Ms. Immanuel Stanton presents sitting up in bedside chair. She is agreeable to treatment. She stood from chair with Supervision and then ambulated with CGA to bathroom. She was independent with all bathroom hygiene. She then ambulated into hallway with CGA. She needed min assist for sit to supine transfer. She is progressing well toward all goals. Will continue PT efforts. This section established at most recent assessment   PROBLEM LIST (Impairments causing functional limitations):  1. Decreased ADL/Functional Activities  2.  Decreased Transfer Abilities  3. Decreased Ambulation Ability/Technique  4. Increased Pain  5. Decreased Activity Tolerance  6. Decreased Knowledge of Precautions   INTERVENTIONS PLANNED: (Benefits and precautions of physical therapy have been discussed with the patient.)  1. Balance Exercise  2. Bed Mobility  3. Gait Training  4. Home Exercise Program (HEP)  5. Therapeutic Activites  6. Therapeutic Exercise/Strengthening  7. Transfer Training  8. education     TREATMENT PLAN: Frequency/Duration:daily to twice daily for 1 week  Rehabilitation Potential For Stated Goals: Excellent     RECOMMENDED REHABILITATION/EQUIPMENT: (at time of discharge pending progress): Due to the probability of continued deficits (see above) this patient will not likely need continued skilled physical therapy after discharge. Equipment:    None at this time              HISTORY:   History of Present Injury/Illness (Reason for Referral): Admitted for above surgeries. Past Medical History/Comorbidities:   Ms. Felipe Reddy  has a past medical history of Abnormal stress echo, Anxiety, Arrhythmia, BCC (basal cell carcinoma of skin), CAD Nonobstructive 10-20% LAD on CTA Coronary, Chronic insomnia, Dyslipidemia, Fibromyalgia, GERD (gastroesophageal reflux disease), IBS (irritable bowel syndrome), Ischemic colitis ([address] Osteopenia, Overactive bladder, Psychiatric disorder, SCCA (squamous cell carcinoma) of skin, and Systolic congestive heart failure ([address] Ms. Felipe Reddy  has a past surgical history that includes hx tonsillectomy; implant breast silicone/eq (Bilateral, ); hx colonoscopy (); hx kym and bso (); hx  section (); hx breast biopsy (Left, ); hx breast augmentation (); ICD (N/A, 2018); CORONARY ARTERY BYPASS GRAFT X  3 , [place] (N/A, 2018); VEIN HARVEST GREATER SAPHENOUS (Left, 2018); and ESOPHAGEAL TRANS ECHOCARDIOGRAM (N/A, 2018).   Social History/Living Environment:   Home Environment: Private residence  One/Two Story Residence: [facility] level  # of Interior Steps: 2  Interior Rails: Both  Living Alone: No  Support Systems: Spouse/Significant Other/Partner  Patient Expects to be Discharged to[employer] residence  Current DME Used/Available at Home: None  Prior Level of Function/Work/Activity:  Lives at home with spouse, independent in home and community. Drives, etc.  Dominant Side:         RIGHT   Number of Personal Factors/Comorbidities that affect the Plan of Care: 3+: HIGH COMPLEXITY   EXAMINATION:   Most Recent Physical Functioning:   Gross Assessment:                  Posture:  Posture (WDL): Exceptions to WDL  Posture Assessment: Forward head, Rounded shoulders  Balance:  Sitting: Intact  Standing: Intact Bed Mobility:  Sit to Supine: Minimum assistance  Wheelchair Mobility:     Transfers:  Sit to Stand: Supervision  Stand to Sit: Supervision  Gait:     Base of Support: Widened  Speed/Pepper: Slow  Step Length: Left shortened;Right shortened  Distance (ft): 180 Feet (ft)  Ambulation - Level of Assistance: Contact guard assistance      Body Structures Involved:  1. Heart Body Functions Affected:  1. Sensory/Pain  2. Cardio  3. Movement Related Activities and Participation Affected:  1. Mobility  2. Self Care  3. Domestic Life  4. Community, Social and [place]   Number of elements that affect the Plan of Care: 4+: HIGH COMPLEXITY   CLINICAL PRESENTATION:   Presentation: Stable and uncomplicated: LOW COMPLEXITY   CLINICAL DECISION MAKIN [facility] Mobility Inpatient Short Form  How much difficulty does the patient currently have. .. Unable A Lot A Little None   1. Turning over in bed (including adjusting bedclothes, sheets and blankets)? [] 1   [] 2   [x] 3   [] 4   2. Sitting down on and standing up from a chair with arms ( e.g., wheelchair, bedside commode, etc.)   [] 1   [] 2   [x] 3   [] 4   3. Moving from lying on back to sitting on the side of the bed? [] 1   [] 2   [x] 3   [] 4   How much help from another person does the patient currently need. .. Total A Lot A Little None   4. Moving to and from a bed to a chair (including a wheelchair)? [] 1   [] 2   [x] 3   [] 4   5. Need to walk in hospital room? [] 1   [] 2   [x] 3   [] 4   6. Climbing 3-5 steps with a railing? [] 1   [] 2   [x] 3   [] 4   © 2007, Trustees of [employer], under license to [email or web address]. All rights reserved      Score:  Initial: 18 Most Recent: X (Date: -- )    Interpretation of Tool:  Represents activities that are increasingly more difficult (i.e. Bed mobility, Transfers, Gait). Score 24 23 22-20 19-15 14-10 9-7 6     Modifier CH CI CJ CK CL CM CN      ? Mobility - Walking and Moving Around:     - CURRENT STATUS: CK - 40%-59% impaired, limited or restricted    - GOAL STATUS: CJ - 20%-39% impaired, limited or restricted    - D/C STATUS:  ---------------To be determined---------------  [place] / [place] / Product Type: Medicare /      Medical Necessity:     · Patient is expected to demonstrate progress in strength and functional technique to increase independence with   and improve safety during all functional mobility. Reason for Services/Other Comments:  · Patient continues to require skilled intervention due to medical complications. Use of outcome tool(s) and clinical judgement create a POC that gives a: Clear prediction of patient's progress: LOW COMPLEXITY            TREATMENT:   (In addition to Assessment/Re-Assessment sessions the following treatments were rendered)   Pre-treatment Symptoms/Complaints:  \"I need something good to eat. \"  Pain: Initial:   Pain Intensity 1: 0  Post Session:  None noted      Therapeutic Activity: (    15 Minutes): Therapeutic activities including Bed transfers, Chair transfers, Toilet transfers and Ambulation on level ground to improve mobility, strength and activity tolerance. Required minimal verbal cues   to for technique for sit to supine transfer. Therapeutic Exercise: (  8 minutes):  Exercises per grid below to improve mobility, strength and balance. Required minimal visual and verbal cues to promote proper body alignment. Progressed complexity of movement as indicated. Hands on table top for balance. Date: 12/18/18        Ambulation  Device  assistance         Partial Squats         Hip Abduction/ Adduction Standing         Heel Raises  Standing x20 AB        Toe Raises Standing x20 AB        Hip Flexion Standing x20 AB                 Key:  R=right, L=left, B=bilaterally, A=active, AA=active assisted, P=passive      Braces/Orthotics/Lines/Etc:   · monitor  · O2 Device: Room air  Treatment/Session Assessment:    · Response to Treatment:  Pleasant and cooperative. · Interdisciplinary Collaboration:   o Physical Therapy Assistant  o Registered Nurse  · After treatment position/precautions:   o Up in chair  o Bed/Chair-wheels locked  o Call light within reach  o RN notified  o Family at bedside   · Compliance with Program/Exercises: Compliant all of the time  · Recommendations/Intent for next treatment session: \"Next visit will focus on advancements to more challenging activities and reduction in assistance provided\".   Total Treatment Duration:  PT Patient Time In/Time Out  Time In: 1335  Time Out: [facility]

## 2018-12-18 NOTE — PROGRESS NOTES
TRANSFER - IN REPORT:    Verbal report received from [address] (name) on Parviz Cage  being received from CV ICU(unit) for routine progression of care      Report consisted of patients Situation, Background, Assessment and   Recommendations(SBAR). Information from the following report(s) SBAR, Procedure Summary, Intake/Output, MAR and Cardiac Rhythm NSR was reviewed with the receiving nurse. Opportunity for questions and clarification was provided. Assessment completed upon patients arrival to unit and care assumed. Dual skin assessment complete. No skin breakdown noted. MS and LLE ANGY.

## 2018-12-18 NOTE — PROGRESS NOTES
Bedside and Verbal shift change report given to self (oncoming nurse) by Trinh Benedict (offgoing nurse). Report included the following information SBAR, Kardex, Intake/Output, MAR and Recent Results.

## 2018-12-18 NOTE — PROGRESS NOTES
Problem: Mobility Impaired (Adult and Pediatric)  Goal: *Acute Goals and Plan of Care (Insert Text)  LTG:  (1.)Ms. Briana August will move from supine to sit and sit to supine , scoot up and down and roll side to side in flat bed without siderails with  INDEPENDENT within 7 day(s). (2.)Ms. Briana August will perform all functional transfers with  INDEPENDENT using the least restrictive/no device within 7 day(s). (3.)Ms. Briana August will ambulate with  INDEPENDENT for 500+ feet with normal vital sign response with the least restrictive/no device within 7 day(s). (4.)Ms. Briana August will ambulate up/down 2 steps with bilateral  railing with  MODIFIED INDEPENDENCE with no device within 7 day(s). PHYSICAL THERAPY: Initial Assessment, Treatment Day: Day of Assessment, AM 12/18/2018  INPATIENT: Hospital Day: 5  [place] / [place] / Product Type: Medicare /   ICD precautions L UE   NAME/AGE/GENDER: Belinda Carbajal is a 70 y.o. female   PRIMARY DIAGNOSIS: Atherosclerosis of native coronary artery of native heart with unstable angina pectoris (HCC) [I25.110] S/P CABG x 4 S/P CABG x 4  Procedure(s) (LRB):  ICD (N/A)  4 Days Post-Op  ICD-10: Treatment Diagnosis:    · Other abnormalities of gait and mobility (R26.89)   Precaution/Allergies:  Codeine; Cenestin [synthetic conj estrogens a]; Doxycycline; Levaquin [levofloxacin]; Pcn [penicillins]; and Sulfa dyne      ASSESSMENT:     Ms. Briana August presents sitting in recliner with spouse at bedside. She was pleasant and agreeable for PT assessment. At baseline she is extremely active and independent. Patient stood with SBA and ambulated 175' with CGA with decreased reciprocal arm swing and short steps. SpO2 remained 95% on room air and HR increased from 90's to 100's. Sat with SBA. Patient has declined in functional mobility following undergoing above surgeries, anticipate that she will progress quickly.   Ms. Briana August would benefit from skilled physical therapy (medically necessary) to address her deficits and maximize her function. Initiated treatment to include exercises below. Educated patient in ICD precautions as well. Patient with good participation. This section established at most recent assessment   PROBLEM LIST (Impairments causing functional limitations):  1. Decreased ADL/Functional Activities  2. Decreased Transfer Abilities  3. Decreased Ambulation Ability/Technique  4. Increased Pain  5. Decreased Activity Tolerance  6. Decreased Knowledge of Precautions   INTERVENTIONS PLANNED: (Benefits and precautions of physical therapy have been discussed with the patient.)  1. Balance Exercise  2. Bed Mobility  3. Gait Training  4. Home Exercise Program (HEP)  5. Therapeutic Activites  6. Therapeutic Exercise/Strengthening  7. Transfer Training  8. education     TREATMENT PLAN: Frequency/Duration:daily to twice daily for 1 week  Rehabilitation Potential For Stated Goals: Excellent     RECOMMENDED REHABILITATION/EQUIPMENT: (at time of discharge pending progress): Due to the probability of continued deficits (see above) this patient will not likely need continued skilled physical therapy after discharge. Equipment:    None at this time              HISTORY:   History of Present Injury/Illness (Reason for Referral): Admitted for above surgeries. Past Medical History/Comorbidities:   Ms. Rebeca Balderrama  has a past medical history of Abnormal stress echo, Anxiety, Arrhythmia, BCC (basal cell carcinoma of skin), CAD Nonobstructive 10-20% LAD on CTA Coronary, Chronic insomnia, Dyslipidemia, Fibromyalgia, GERD (gastroesophageal reflux disease), IBS (irritable bowel syndrome), Ischemic colitis ([address] Osteopenia, Overactive bladder, Psychiatric disorder, SCCA (squamous cell carcinoma) of skin, and Systolic congestive heart failure ([address]   Ms. Rebeca Balderrama  has a past surgical history that includes hx tonsillectomy; implant breast silicone/eq (Bilateral, 1988); hx colonoscopy (); hx kym and bso (); hx  section (); hx breast biopsy (Left, ); hx breast augmentation (); ICD (N/A, 2018); CORONARY ARTERY BYPASS GRAFT X  3 , LIMA (N/A, 2018); VEIN HARVEST GREATER SAPHENOUS (Left, 2018); and ESOPHAGEAL TRANS ECHOCARDIOGRAM (N/A, 2018). Social History/Living Environment:   Home Environment: Private residence  One/Two Story Residence: Split level  # of Interior Steps: 2  Interior Rails: Both  Living Alone: No  Support Systems: Spouse/Significant Other/Partner  Patient Expects to be Discharged to[de-identified] Private residence  Current DME Used/Available at Home: None  Prior Level of Function/Work/Activity:  Lives at home with spouse, independent in home and community. Drives, etc.  Dominant Side:         RIGHT   Number of Personal Factors/Comorbidities that affect the Plan of Care: 3+: HIGH COMPLEXITY   EXAMINATION:   Most Recent Physical Functioning:   Gross Assessment:  AROM: Generally decreased, functional  Strength: Generally decreased, functional               Posture:  Posture (WDL): Exceptions to WDL  Posture Assessment: Forward head, Rounded shoulders  Balance:  Sitting: Intact  Standing: Intact Bed Mobility:     Wheelchair Mobility:     Transfers:  Sit to Stand: Supervision  Stand to Sit: Supervision  Bed to Chair: Supervision  Gait:     Base of Support: Widened  Speed/Pepper: Pace decreased (<100 feet/min); Slow  Step Length: Left shortened;Right shortened  Gait Abnormalities: Altered arm swing;Decreased step clearance; Other(flexed knees)  Distance (ft): 175 Feet (ft)  Ambulation - Level of Assistance: Contact guard assistance      Body Structures Involved:  1. Heart Body Functions Affected:  1. Sensory/Pain  2. Cardio  3. Movement Related Activities and Participation Affected:  1. Mobility  2. Self Care  3. Domestic Life  4.  Community, Social and [place]   Number of elements that affect the Plan of Care: 4+: HIGH COMPLEXITY   CLINICAL PRESENTATION:   Presentation: Stable and uncomplicated: LOW COMPLEXITY   CLINICAL DECISION MAKING:   [facility] AM-PAC 6 Clicks   Basic Mobility Inpatient Short Form  How much difficulty does the patient currently have. .. Unable A Lot A Little None   1. Turning over in bed (including adjusting bedclothes, sheets and blankets)? [] 1   [] 2   [x] 3   [] 4   2. Sitting down on and standing up from a chair with arms ( e.g., wheelchair, bedside commode, etc.)   [] 1   [] 2   [x] 3   [] 4   3. Moving from lying on back to sitting on the side of the bed? [] 1   [] 2   [x] 3   [] 4   How much help from another person does the patient currently need. .. Total A Lot A Little None   4. Moving to and from a bed to a chair (including a wheelchair)? [] 1   [] 2   [x] 3   [] 4   5. Need to walk in hospital room? [] 1   [] 2   [x] 3   [] 4   6. Climbing 3-5 steps with a railing? [] 1   [] 2   [x] 3   [] 4   © 2007, Trustees of [facility], under license to [employer]. All rights reserved      Score:  Initial: 18 Most Recent: X (Date: -- )    Interpretation of Tool:  Represents activities that are increasingly more difficult (i.e. Bed mobility, Transfers, Gait). Score 24 23 22-20 19-15 14-10 9-7 6     Modifier CH CI CJ CK CL CM CN      ? Mobility - Walking and Moving Around:     - CURRENT STATUS: CK - 40%-59% impaired, limited or restricted    - GOAL STATUS: CJ - 20%-39% impaired, limited or restricted    - D/C STATUS:  ---------------To be determined---------------  [place] / [place] / Product Type: Medicare /      Medical Necessity:     · Patient is expected to demonstrate progress in strength and functional technique to increase independence with   and improve safety during all functional mobility. Reason for Services/Other Comments:  · Patient continues to require skilled intervention due to medical complications.    Use of outcome tool(s) and clinical judgement create a POC that gives a: Clear prediction of patient's progress: LOW COMPLEXITY            TREATMENT:   (In addition to Assessment/Re-Assessment sessions the following treatments were rendered)   Pre-treatment Symptoms/Complaints:  None. Pain: Initial:   Pain Intensity 1: 4  Pain Location 1: Back  Pain Orientation 1: Lower  Pain Intervention(s) 1: Ambulation/Increased Activity  Post Session:  4     Therapeutic Exercise: (  8 minutes):  Exercises per grid below to improve mobility, strength and balance. Required minimal visual and verbal cues to promote proper body alignment. Progressed complexity of movement as indicated. Hands on table top for balance. Date: 12/18/18        Ambulation  Device  assistance         Partial Squats         Hip Abduction/ Adduction Standing         Heel Raises  Standing x20 AB        Toe Raises Standing x20 AB        Hip Flexion Standing x20 AB                 Key:  R=right, L=left, B=bilaterally, A=active, AA=active assisted, P=passive      Braces/Orthotics/Lines/Etc:   · monitor  · O2 Device: Room air  Treatment/Session Assessment:    · Response to Treatment:  Pleasant and cooperative. · Interdisciplinary Collaboration:   o Physical Therapist  o Registered Nurse  o Respiratory Therapist  · After treatment position/precautions:   o Up in chair  o Bed/Chair-wheels locked  o Call light within reach  o RN notified  o Family at bedside   · Compliance with Program/Exercises: Compliant all of the time  · Recommendations/Intent for next treatment session: \"Next visit will focus on advancements to more challenging activities and reduction in assistance provided\".   Total Treatment Duration:  PT Patient Time In/Time Out  Time In: 1124  Time Out: CHON Parada, DPT

## 2018-12-18 NOTE — PROGRESS NOTES
Bedside shift report given to Oracio Watters RN (oncoming nurse) by Carmen Starks RN (offgoing nurse). Bedside shift report included the following information: SBAR, Kardex, ED Summary, MAR, and Recent Results.

## 2018-12-18 NOTE — PROGRESS NOTES
Bedside shift report received from Alfonso Abdul RN (offgoing nurse). Report given to Cherelle Andrade RN. Vital signs stable. No complaints present. Patient in stable condition.

## 2018-12-18 NOTE — DISCHARGE SUMMARY
Discharge Summary     Patient ID:  Tenzin Dominguez  [id number]  84 y.o.  1955    Admit date: 12/14/2018    Discharge date:  12/19/2018    Admitting Physician: Sylvie Ferguson MD     Discharge Physician: ISAIAH Kumari/Dr. Gabriel Smart    Admission Diagnoses: Atherosclerosis of native coronary artery of native heart with unstable angina pectoris [facility]) [I25.110]    Discharge Diagnoses:   Patient Active Problem List    Diagnosis Date Noted    Complete heart block ([facility] 50/10/3367    Systolic congestive heart failure ([address] 12/18/2018    CAD (coronary artery disease) 12/17/2018    Encounter for weaning from ventilator ([facility] 12/14/2018    Hypoxia 12/14/2018    S/P CABG x 4 12/14/2018    Coronary artery disease involving native coronary artery of native heart with angina pectoris ([facility] 10/31/2018    LBBB (left bundle branch block) 10/31/2018    Osteopenia     BCC (basal cell carcinoma of skin)     SCCA (squamous cell carcinoma) of skin     Anxiety     Chronic insomnia     Dyslipidemia     Overactive bladder     IBS (irritable bowel syndrome)     Abnormal stress echo 03/01/2015    Fibromyalgia 08/14/2012    GERD (gastroesophageal reflux disease) 08/14/2012       Procedures this admission:  CABG surgery, implantation of biventricular ICD  Consults: Pulmonary/Intensive Care, Electrophysiology     Hospital Course: Patient presented for elective CABG. She was seen in the office by Dr. Mona Campbell for pre-operative evaluation. She has FH of CAD and had noted worsening ARRIAGA. She had a LBBB. She underwent nuclear stress test that showed a reduced EF of 35% and a large fixed bryan-septal and apical defect. LHC was planned. She underwent cardiac catheterization that showed severe multivessel disease. CABG surgery was planned. She underwent CABG X 3 with LIMA to LAD, sequential SVG to Ramus and OM on 12/14/18. On induction, she had a short episode of asystole.  She was resuscitated with chest compressions and epinephrine. She maintained sinus rhythm with LBBB after resuscitation. After conduit was obtained on touching the pericardium, she again had asystole. Pacing wires were placed. Post operatively, she had complete heart block. Electrophysiology was consulted. She was seen by Dr. Brendon Brown. She underwent implantation of a [employer] biventricular ICD on 12/14. She had post op hypotension that required pressor support. She was transferred to [facility] on 12/17. She felt drowsy. She was weaned off of O2. She progressed well with PT. She remained in sinus rhythm. The morning of 12/19/18, patient was feeling well and ambulating without any symptoms. Patient was determined stable and ready for discharge. Patient was instructed on the importance of medication compliance and outpatient follow up. For maximized medical therapy for CAD, patient will continue ASA, BB, and statin as well. She is not discharged on ACE-I due to low BP after surgery. She will continue Coreg. ACE-I can be resumed on outpatient basis at the discretion of Dr. Gene Chatterjee. For systolic CHF, she will continue Coreg. As above, lisinopril as held for low BP. The patient will have close transitional care follow up and device check with [facility] Dr. Gene Chatterjee on December 28th at 10:30am THE [facility] AT [facility]). The patient will follow up with Dr. Esther Sheffield on January 8th at 3:20pm and has been referred to cardiac rehab. DISPOSITION: The patient is being discharged home with home health services in stable condition on a low saturated fat, low cholesterol and low salt diet. The patient is instructed to advance activities as tolerated to the limit of fatigue or shortness of breath. The patient is instructed to avoid all heavy lifting or activities that strain the chest or upper arm muscles. Strenuous activity should be avoided.  The patient is instructed to watch for signs of infection which include: increasing area of redness, fever or purulent drainage at the incision site. The patient is instructed to call the office or return to the ER for immediate evaluation for any shortness of breath or chest pain not relieved by NTG. Discharge Exam:   Visit Vitals  BP 98/54 (BP 1 Location: Right arm, BP Patient Position: Sitting)   Pulse 95   Temp 97.8 °F (36.6 °C)   Resp 16   Ht 5' 6\" (1.676 m)   Wt 153 lb 9.6 oz (69.7 kg)   SpO2 95%   BMI 24.79 kg/m²         Physical Exam:  General: Well Developed, Well Nourished, No Acute Distress, Alert & Oriented  Neck: supple, no JVD  Heart: S1S2 with RRR without murmurs or gallops  Lungs: Clear throughout auscultation bilaterally without adventitious sounds  Abd: soft, nontender, nondistended, with good bowel sounds  Ext: warm, no edema  Sternal incision: clean, dry, and intact  Left subclavian site: clean, dry and intact  Skin: warm and dry      Recent Results (from the past 24 hour(s))   MAGNESIUM    Collection Time: 12/19/18  4:43 AM   Result Value Ref Range    Magnesium 2.0 1.8 - 2.4 mg/dL   POTASSIUM    Collection Time: 12/19/18  4:43 AM   Result Value Ref Range    Potassium 3.4 (L) 3.5 - 5.1 mmol/L         Patient Instructions:   Current Discharge Medication List      START taking these medications    Details   oxyCODONE-acetaminophen (PERCOCET) 5-325 mg per tablet Take 1 Tab by mouth every four (4) hours as needed. Max Daily Amount: 6 Tabs. Qty: 16 Tab, Refills: 0    Associated Diagnoses: S/P CABG x 4         CONTINUE these medications which have NOT CHANGED    Details   carvedilol (COREG) 6.25 mg tablet Take 6.25 mg by mouth two (2) times daily (with meals). aspirin delayed-release 81 mg tablet Take 81 mg by mouth nightly. pantoprazole (PROTONIX) 40 mg tablet TAKE 1 TABLET BY MOUTH DAILY  Qty: 90 Tab, Refills: 1      citalopram (CELEXA) 10 mg tablet Take 1 Tab by mouth daily. Qty: 90 Tab, Refills: 1      rosuvastatin (CRESTOR) 20 mg tablet Take 1 Tab by mouth nightly.   Qty: 90 Tab, Refills: 3      nitroglycerin (NITROSTAT) 0.4 mg SL tablet 1 Tab by SubLINGual route every five (5) minutes as needed for Chest Pain.   Qty: 25 Tab, Refills: 11      estradiol (ESTRACE) 0.01 % (0.1 mg/gram) vaginal cream 1 gm PV every day X 2 weeks then 1/2 gm twice weekly  Qty: 42.5 g, Refills: 5    Associated Diagnoses: Vaginal atrophy         STOP taking these medications       amiodarone (CORDARONE) 200 mg tablet Comments:   Reason for Stopping:         mupirocin calcium (BACTROBAN) 2 % nasal ointment Comments:   Reason for Stopping:         lisinopril (PRINIVIL, ZESTRIL) 10 mg tablet Comments:   Reason for Stopping:                 Signed:  Virginia Murillo PA-C  12/19/2018

## 2018-12-18 NOTE — PROGRESS NOTES
Care Management Interventions  PCP Verified by CM: Yes(confirms Dr. Maxwell Anand)  Mode of Transport at Discharge: Other (see comment)  Transition of Care Consult (CM Consult): Discharge Planning  Discharge Durable Medical Equipment: (none)  Current Support Network: Lives with Spouse, Own Home(lives with spouse, Nohemi Foil -[phone])  Confirm Follow Up Transport: Self(driving self prior to admission)  Plan discussed with Pt/Family/Caregiver: Yes  Freedom of Choice Offered: Yes  [employer] Information Provided?: (confirms MCR/ supplemental - able to get rx with drug plan)  Discharge Location  Discharge Placement: Home with home health  Patient lives with her  and was independent of ADL's and driving prior to admission. Patient d/c plan is home with home health when medically stable. CM following.

## 2018-12-18 NOTE — PROGRESS NOTES
Today's Date: 12/18/2018  Date of Admission: 12/14/2018    Chart Reviewed. Subjective:     Patient feels drowsy. She states she has lost track of time/days. She denies any dyspnea. Appetite ok. Medications Reviewed. Objective:     Vitals:    12/17/18 2251 12/18/18 0302 12/18/18 0304 12/18/18 0727   BP: 116/58 133/80  134/64   Pulse: 95 (!) 104  96   Resp: 18 20 16   Temp: 97.9 °F (36.6 °C) 98 °F (36.7 °C)  98.2 °F (36.8 °C)   SpO2: 98% 97%  98%   Weight:   153 lb 9.6 oz (69.7 kg)    Height:   5' 6\" (1.676 m)        Intake and Output  Current Shift: No intake/output data recorded. Last 3 Shifts: 12/16 1901 - 12/18 0700  In: 1340 [P.O.:1340]  Out: 1850 [Urine:1850]    Physical Exam:  General: Well Developed, Well Nourished, No Acute Distress, Alert to verbal but drowsy, Appropriate mood  Neck: supple, no JVD  Heart: S1S2 with RRR without murmurs or gallops  Lungs: Clear throughout auscultation bilaterally without adventitious sounds  Abd: soft, nontender, nondistended, with good bowel sounds  Ext: no edema bilaterally  Sternal incision: clean, dry, and intact  Skin: warm and dry    LABS  Data Review:   Recent Labs     12/18/18  0435 12/17/18  0349 12/16/18  0422   NA  --  139 142   K 3.7 3.9 3.5   MG 2.1 2.1 2.5*   BUN  --  11 12   CREA  --  0.39* 0.43*   GLU  --  113* 106*   WBC 7.0 7.1 7.8   HGB 9.2* 8.6* 8.4*   HCT 29.0* 27.0* 26.9*   * 97* 88*       Estimated Creatinine Clearance: 134.6 mL/min (A) (based on SCr of 0.39 mg/dL (L)).       Assessment/Plan:     Principal Problem:    S/P CABG x 4 (12/14/2018)  Continue ASA, BB, ACE-I, statin, continue PT, s/p BiV ICD, wean O2 as tolerated      Active Problems:    Encounter for weaning from ventilator ([facility] (12/14/2018)      Hypoxia (12/14/2018)  On O2 by NC, wean as tolerated      Systolic CHF  Continue Coreg, lisinopril as BP tolerates, ICD placed     CAD  As above, continue medical therapy     Complete heart block/asystole  S/p BiV ICD        Brooke Villegas PA-C

## 2018-12-18 NOTE — DISCHARGE INSTRUCTIONS
CARDIAC DEVICE INSTRUCTION SHEET    1. You should have received a 1-2 weeks follow up appointment upon discharge from the hospital.  If you did not receive this appointment prior to leaving the hospital, please contact us at [phone]. 2.  Keep your incision dry for 14 days. DO NOT put ointments, creams or lotions on the incision for 2 weeks. 3.  Your dressing will be removed at your follow up appointment. If you have sutures/staples, the nurse will remove them at the follow up appointment. 4.  Call us IMMEDIATELY if you develop fever, pain, redness, or drainage at the incision site. 5.  You may use your pacemaker/ICD arm, but keep your arm lower than your shoulder for the first 8 weeks (or until cleared by a physician) to prevent the device lead(s) from moving. 6.  Do not lift more than 10 pounds for 4 weeks and 20 pounds for 8 weeks. 7.  Within 6 weeks you should receive your cardiac device ID card. Carry your card with you at all times. Always show it to any doctor or dentist you see. 8.    You will need to have your device evaluated every 3 months via office, remote, or telephone. 9.  Microwaves WILL NOT harm your device. Warnings do not apply to you. 10.  At airports, always show your cardiac device ID card. You may walk through metal detectors but do not allow the hand held wand near your device. 11.  DO NOT have an MRI without contacting your cardiologists office first.     12.  Please refer to the education booklet given to you at the hospital for the activities and equipment you should avoid. Some may reprogram or interfere with your cardiac device. Coronary Artery Bypass Graft: What to Expect at Home  Your Recovery    Coronary artery bypass graft (CABG) is surgery to treat coronary artery disease. The surgery helps blood make a detour, or bypass, around one or more narrowed or blocked coronary arteries.  Coronary arteries are the blood vessels that bring blood to the heart. Your doctor did the surgery through a cut, called an incision, in your chest.  You will feel tired and sore for the first few weeks after surgery. You may have some brief, sharp pains on either side of your chest. Your chest, shoulders, and upper back may ache. The incision in your chest and the area where the healthy vein was taken may be sore or swollen. These symptoms usually get better after 4 to 6 weeks. You will probably be able to do many of your usual activities after 4 to 6 weeks. But for 2 to 3 months you will not be able to lift heavy objects or do activities that strain your chest or upper arm muscles. At first you may notice that you get tired easily and need to rest often. It may take 1 to 2 months to get your energy back. Some people find that they are more emotional after this surgery. You may cry easily or show emotion in ways that are unusual for you. This is common and may last for up to a year. Some people get depressed after CABG surgery. Talk with your doctor if you have sadness that continues or you are concerned about how you are feeling. Treatment and other support can help you feel better. Even though the surgery may improve your symptoms, you will still need to make changes in your lifestyle to lower your risk of a heart attack or stroke. It will be important to eat a heart-healthy diet, get regular exercise, not smoke, take your heart medicines, and reduce stress. You will likely start a cardiac rehabilitation (rehab) program in the hospital. You will continue with this rehab program after you go home to help you recover and prevent problems with your heart. Talk to your doctor about whether rehab is right for you. This care sheet gives you a general idea about how long it will take for you to recover. But each person recovers at a different pace. Follow the steps below to get better as quickly as possible.   How can you care for yourself at home?  Activity    · Rest when you feel tired. Getting enough sleep will help you recover. Try to sleep on your back for 4 to 6 weeks while your breastbone (sternum) heals. This usually takes about 4 to 6 weeks.     · Try to walk each day. Start by walking a little more than you did the day before. Bit by bit, increase the amount you walk. Walking boosts blood flow and helps prevent pneumonia and constipation.     · Avoid strenuous activities, such as bicycle riding, jogging, weight lifting, or heavy aerobic exercise, until your doctor says it is okay.     · For 3 months, avoid activities that strain your chest or upper arm muscles. This includes pushing a  or vacuum, mopping floors, or swinging a golf club or tennis racquet.     · For 2 to 3 months, avoid lifting anything that would make you strain. This may include a child, heavy grocery bags and milk containers, a heavy briefcase or backpack, or cat litter or dog food bags.     · Hold a pillow firmly over your chest incision when you cough or take deep breaths. This will support your chest and reduce your pain.     · Do breathing exercises at home as instructed by your doctor. This will help prevent pneumonia.     · Ask your doctor when you can drive again.     · You will probably need to take 4 to 12 weeks off from work. It depends on the type of work you do and how you feel.     · You may shower as usual. Pat the incision dry. Do not take a bath for the first 3 weeks, or until your doctor tells you it is okay.     · Do not swim or use a hot tub for at least 1 month, or until your doctor says it is okay.     · Ask your doctor when it is okay for you to have sex. Diet    · Eat a heart-healthy diet. If you have not been eating this way, talk to your doctor. You also may want to talk to a dietitian.  A dietitian can help you learn about healthy foods.     · Drink plenty of fluids (unless your doctor tells you not to).     · You may notice that your bowel movements are not regular right after your surgery. This is common. Try to avoid constipation and straining with bowel movements. You may want to take a fiber supplement every day. If you have not had a bowel movement after a couple of days, ask your doctor about taking a mild laxative. Medicines    · Your doctor will tell you if and when you can restart your medicines. He or she will also give you instructions about taking any new medicines.     · If you take blood thinners, such as warfarin (Coumadin), clopidogrel (Plavix), or aspirin, be sure to talk to your doctor. He or she will tell you if and when to start taking those medicines again. Make sure that you understand exactly what your doctor wants you to do.     · Your doctor may give you medicines to prevent blood clots, keep your heartbeat steady, and lower your blood pressure and cholesterol. Take your medicines exactly as prescribed. Call your doctor if you think you are having a problem with your medicine.     · Be safe with medicines. Take pain medicines exactly as directed. ? If the doctor gave you a prescription medicine for pain, take it as prescribed. ? If you are not taking a prescription pain medicine, ask your doctor if you can take an over-the-counter medicine. ? Do not take aspirin, ibuprofen (Advil, Motrin), naproxen (Aleve), or other nonsteroidal anti-inflammatory drugs (NSAIDs) unless your doctor says it is okay.     · If you think your pain medicine is making you sick to your stomach:  ? Take your medicine after meals (unless your doctor has told you not to). ? Ask your doctor for a different pain medicine.     · If your doctor prescribed antibiotics, take them as directed. Do not stop taking them just because you feel better. You need to take the full course of antibiotics.    Incision care    · If you have strips of tape on the incisions the doctor made, leave the tape on for a week or until it falls off.     · Wash the area daily with warm, soapy water, and pat it dry. Don't use hydrogen peroxide or alcohol, which can slow healing. You may cover the area with a gauze bandage if it weeps or rubs against clothing. Change the bandage every day.     · Keep the area clean and dry.     · Do not use any creams, lotions, powders, ointments, or oils unless your doctor tells you it is okay.     · If you have an incision in your leg:  ? Wear support stockings on your legs during the day for the first 2 weeks. You can take the stockings off at night while you sleep. ? Raise your legs above the level of your heart whenever you lay down for the first 4 to 6 weeks. Other instructions    · Keep track of your weight. Weigh yourself every day at the same time of day, on the same scale, in the same amount of clothing. A sudden increase in weight can be a sign of a problem with your heart. Tell your doctor if you suddenly gain weight, such as 3 pounds or more in 2 to 3 days.     · Do not smoke. Smoking can make it harder for you to recover and it will raise the chances of your arteries narrowing again. If you need help quitting, talk to your doctor about stop-smoking programs and medicines. These can increase your chances of quitting for good. Follow-up care is a key part of your treatment and safety. Be sure to make and go to all appointments, and call your doctor if you are having problems. It's also a good idea to know your test results and keep a list of the medicines you take. When should you call for help? Call 911 anytime you think you may need emergency care. For example, call if:    · You passed out (lost consciousness).     · You have severe trouble breathing.     · You have sudden chest pain and shortness of breath, or you cough up blood.     · You have severe pain in your chest.     · You have symptoms of a heart attack. These may include:  ? Chest pain or pressure, or a strange feeling in the chest.  ? Sweating. ? Shortness of breath.   ? Nausea or vomiting. ? Pain, pressure, or a strange feeling in the back, neck, jaw, or upper belly or in one or both shoulders or arms. ? Lightheadedness or sudden weakness. ? A fast or irregular heartbeat. After you call 911, the  may tell you to chew 1 adult-strength or 2 to 4 low-dose aspirin. Wait for an ambulance. Do not try to drive yourself.     · You have angina symptoms (such as chest pain or pressure) that do not go away with rest or are not getting better within 5 minutes after you take a dose of nitroglycerin.    Call your doctor now or seek immediate medical care if:    · You have pain that does not get better after you take pain medicine.     · You have a fever over 100°F.     · You have loose stitches, or your incision comes open.     · Bright red blood has soaked through the bandage over your incision.     · You have signs of infection, such as:  ? Increased pain, swelling, warmth, or redness. ? Red streaks leading from the incision. ? Pus draining from the incision. ? Swollen lymph nodes in your neck, armpits, or groin. ? A fever.     · You have signs of a blood clot in a leg. If you had a vein removed from your leg, you may have tenderness and swelling while your leg heals. But signs of a blood clot may be in a different part of your leg and may include:  ? Pain in your calf, back of the knee, thigh, or groin. ?  Redness and swelling in your leg or groin.     · Your heartbeat feels very fast or slow, skips beats, or flutters.     · You are dizzy or lightheaded, or you feel like you may faint.     · You have new or increased shortness of breath.    Watch closely for changes in your health, and be sure to contact your doctor if:    · You gain weight suddenly, such as 3 pounds or more in 2 to 3 days.     · You have increased swelling in your legs, ankles, or feet.     · You have any concerns about your incision.     · You feel very sad or have other signs of depression, such as trouble sleeping or eating.     · You have questions about diet, exercise, quitting smoking, or stress reduction after surgery. Where can you learn more? Go to http://juan-andrei.info/. Enter F759 in the search box to learn more about \"Coronary Artery Bypass Graft: What to Expect at Home. \"  Current as of: December 6, 2017  Content Version: 11.8  © 8735-2210 [employer]. Care instructions adapted under license by [employer] (which disclaims liability or warranty for this information). If you have questions about a medical condition or this instruction, always ask your healthcare professional. [place] [address] any warranty or liability for your use of this information. Reducing Heart Attack Risk With Daily Medicine: Care Instructions  Your Care Instructions    Heart disease is the number one cause of death. If you are at risk for heart disease, there are many medicines that can reduce your risk. These include:  · ACE inhibitors or ARBs. These are types of blood pressure medicines. They can reduce the risk of heart attacks and strokes if you are at high risk. · Statin medicines. These lower cholesterol. They can also reduce the risk of heart disease and strokes. · Aspirin and other antiplatelets. These prevent blood clots. They can help certain people lower their risk of a heart attack or stroke. · Beta-blocker medicines. These are a type of blood pressure and heart medicine. They can reduce the chance of early death if you have had a heart attack. All medicines can cause side effects. So it is important to understand the pros and cons of any medicine you take. It is also important to take your medicines exactly as your doctor tells you to. Follow-up care is a key part of your treatment and safety. Be sure to make and go to all appointments, and call your doctor if you are having problems.  It's also a good idea to know your test results and keep a list of the medicines you take. ACE inhibitors  ACE (angiotensin-converting enzyme) inhibitors are used for three main reasons. They lower blood pressure, protect the kidneys, and prevent heart attacks and strokes. Examples include benazepril (Lotensin), lisinopril (Prinivil, Zestril), and ramipril (Altace). An angiotensin II receptor blocker (ARB) may be used instead of an ACE inhibitor. ARBs help you in the same ways as ACE inhibitors. Examples include candesartan (Atacand), irbesartan (Avapro), losartan (Cozaar). Before you start taking an ACE inhibitor or an ARB, make sure your doctor knows if:  · You are taking a water pill (diuretic). · You are taking potassium pills or using salt substitutes. · You are pregnant or breastfeeding. · You have had a kidney transplant or other kidney problems. ACE inhibitors and ARBs can cause side effects. Call your doctor right away if you have:  · Trouble breathing. · Swelling in your face, head, neck, or tongue. · Dizziness or lightheadedness. · A dry cough. Statins  Statins lower cholesterol. Examples include atorvastatin (Lipitor), lovastatin (Mevacor), pravastatin (Pravachol), and simvastatin (Zocor). Before you start taking a statin, make sure your doctor knows if:  · You have had a kidney transplant or other kidney problems. · You have liver disease. · You take any other prescription medicine, over-the-counter medicine, vitamins, supplements, or herbal remedies. · You are pregnant or breastfeeding. Statins can cause side effects. Call your doctor right away if you have:  · New, severe muscle aches. · Brown urine. Aspirin  Taking an aspirin every day can lower your risk for a heart attack. A heart attack occurs when a blood vessel in the heart gets blocked. When this happens, oxygen can't get to the heart muscle, and part of the heart dies. Aspirin can help prevent blood clots that can block the blood vessels.   Talk to your doctor before you start taking aspirin every day. He or she may recommend that you take one low-dose aspirin (81 mg) tablet each day, with a meal and a full glass of water. Taking aspirin isn't right for everyone. This is because it can cause serious bleeding. And you may not be able to use aspirin if you:  · Have asthma. · Have an ulcer or other stomach problem. · Take some other medicine (called a blood thinner) that prevents blood clots. · Are allergic to aspirin. Before having a surgery or procedure, tell your doctor or dentist that you take aspirin. He or she will tell you if you should stop taking aspirin beforehand. Make sure that you understand exactly what your doctor wants you to do. Aspirin can cause side effects. Call your doctor right away if you have:  · Unusual bleeding or bruising. · Nausea, vomiting, or heartburn. · Black or bloody stools. Beta-blockers  Beta-blockers are used for three main reasons. They lower blood pressure, relieve angina symptoms (such as chest pain or pressure), and reduce the chances of a second heart attack. They include atenolol (Tenormin), carvedilol (Coreg), and metoprolol (Lopressor). Before you start taking a beta-blocker, make sure your doctor knows if you have:  · Severe asthma or frequent asthma attacks. · A very slow pulse (less than 55 beats a minute). Beta-blockers can cause side effects. Call your doctor right away if you have:  · Wheezing or trouble breathing. · Dizziness or lightheadedness. · Asthma that gets worse. When should you call for help? Watch closely for changes in your health, and be sure to contact your doctor if you have any problems. Where can you learn more? Go to http://juan-andrei.info/. Enter R428 in the search box to learn more about \"Reducing Heart Attack Risk With Daily Medicine: Care Instructions. \"  Current as of: December 6, 2017  Content Version: 11.8  © 8543-1871 [employer].  Care instructions adapted under license by [employer] (which disclaims liability or warranty for this information). If you have questions about a medical condition or this instruction, always ask your healthcare professional. [address] any warranty or liability for your use of this information. Heart-Healthy Diet: Care Instructions  Your Care Instructions    A heart-healthy diet has lots of vegetables, fruits, nuts, beans, and whole grains, and is low in salt. It limits foods that are high in saturated fat, such as meats, cheeses, and fried foods. It may be hard to change your diet, but even small changes can lower your risk of heart attack and heart disease. Follow-up care is a key part of your treatment and safety. Be sure to make and go to all appointments, and call your doctor if you are having problems. It's also a good idea to know your test results and keep a list of the medicines you take. How can you care for yourself at home? Watch your portions  · Learn what a serving is. A \"serving\" and a \"portion\" are not always the same thing. Make sure that you are not eating larger portions than are recommended. For example, a serving of pasta is ½ cup. A serving size of meat is 2 to 3 ounces. A 3-ounce serving is about the size of a deck of cards. Measure serving sizes until you are good at [employer] them. Keep in mind that restaurants often serve portions that are 2 or 3 times the size of one serving. · To keep your energy level up and keep you from feeling hungry, eat often but in smaller portions. · Eat only the number of calories you need to stay at a healthy weight. If you need to lose weight, eat fewer calories than your body burns (through exercise and other physical activity). Eat more fruits and vegetables  · Eat a variety of fruit and vegetables every day. Dark green, deep orange, red, or yellow fruits and vegetables are especially good for you.  Examples include spinach, carrots, peaches, and berries. · Keep carrots, celery, and other veggies handy for snacks. Buy fruit that is in season and store it where you can see it so that you will be tempted to eat it. · Cook dishes that have a lot of veggies in them, such as stir-fries and soups. Limit saturated and trans fat  · Read food labels, and try to avoid saturated and trans fats. They increase your risk of heart disease. Trans fat is found in many processed foods such as cookies and crackers. · Use olive or canola oil when you cook. Try cholesterol-lowering spreads, such as Benecol or Take Control. · Bake, broil, grill, or steam foods instead of frying them. · Choose lean meats instead of high-fat meats such as hot dogs and sausages. Cut off all visible fat when you prepare meat. · Eat fish, skinless poultry, and meat alternatives such as soy products instead of high-fat meats. Soy products, such as tofu, may be especially good for your heart. · Choose low-fat or fat-free milk and dairy products. Eat fish  · Eat at least two servings of fish a week. Certain fish, such as salmon and tuna, contain omega-3 fatty acids, which may help reduce your risk of heart attack. Eat foods high in fiber  · Eat a variety of grain products every day. Include whole-grain foods that have lots of fiber and nutrients. Examples of whole-grain foods include oats, whole wheat bread, and brown rice. · Buy whole-grain breads and cereals, instead of white bread or pastries. Limit salt and sodium  · Limit how much salt and sodium you eat to help lower your blood pressure. · Taste food before you salt it. Add only a little salt when you think you need it. With time, your taste buds will adjust to less salt. · Eat fewer snack items, fast foods, and other high-salt, processed foods. Check food labels for the amount of sodium in packaged foods. · Choose low-sodium versions of canned goods (such as soups, vegetables, and beans).   Limit sugar  · Limit drinks and foods with added sugar. These include candy, desserts, and soda pop. Limit alcohol  · Limit alcohol to no more than 2 drinks a day for men and 1 drink a day for women. Too much alcohol can cause health problems. When should you call for help? Watch closely for changes in your health, and be sure to contact your doctor if:    · You would like help planning heart-healthy meals. Where can you learn more? Go to http://juan-andrei.info/. Enter V137 in the search box to learn more about \"Heart-Healthy Diet: Care Instructions. \"  Current as of: December 6, 2017  Content Version: 11.8  © 2938-9399 [employer]. Care instructions adapted under license by [employer] (which disclaims liability or warranty for this information). If you have questions about a medical condition or this instruction, always ask your healthcare professional. [place] [address] any warranty or liability for your use of this information. Implantable Cardioverter-Defibrillator Placement: What to Expect at [facility] cardioverter-defibrillator (ICD) placement is surgery to put an ICD in your chest. An ICD is a small, battery-powered device that fixes life-threatening changes in your heartbeat. If the ICD detects a life-threatening rapid heart rhythm, it tries to slow the rhythm back to normal using electrical pulses. If the dangerous rhythm does not stop, the ICD sends an electric shock to the heart to restore a normal rhythm. The device then goes back to its watchful mode. Your chest may be sore where the doctor made the cut (incision) and put in the ICD. You also may have a bruise and mild swelling. These symptoms usually get better in 1 to 2 weeks. You may feel a hard ridge along the incision. This usually gets softer in the months after surgery. You probably will be able to see and feel the outline of the ICD under your skin.   You will probably be able to go back to work or your usual routine 1 to 2 weeks after surgery. Your doctor will talk to you about how often you will need to have the ICD checked. When you have an ICD, it is important to avoid electrical devices that can stop your ICD from working right. Check with your doctor about what you need to stay away from, what you need to use with care, and what is okay to use. You will need to stay away from things with strong magnetic and electrical fields such as MRI machines (unless your ICD is safe for an MRI), welding equipment, and power generators. You can use a cell phone, but keep it at least 6 inches away from your ICD. You can safely use most household and office electronics. These include kitchen appliances, electric power tools, and computers. This care sheet gives you a general idea about how long it will take for you to recover. But each person recovers at a different pace. Follow the steps below to get better as quickly as possible. How can you care for yourself at home? Activity    · Rest when you feel tired. Getting enough sleep will help you recover.     · Try to walk each day. Start by walking a little more than you did the day before. Bit by bit, increase the amount you walk. Walking boosts blood flow and helps prevent pneumonia and constipation.     · For 4 to 6 weeks:  ? Avoid activities that strain your chest or upper arm muscles. This includes pushing a  or vacuum, mopping floors, swimming, or swinging a golf club or tennis racquet. ? Do not raise your arm, on the side of your body where the ICD is located, above shoulder level. ? Avoid strenuous activities, such as bicycle riding, jogging, weight lifting, or heavy aerobic exercise. ? Avoid lifting anything that would make you strain.  This may include heavy grocery bags and milk containers, a heavy briefcase or backpack, cat litter or dog food bags, or a child.     · Ask your doctor when you can drive again.     · You will probably need to take about 1 to 2 weeks off from work. It depends on the type of work you do and how you feel.     · Ask your doctor when it is okay for you to have sex. Diet    · You can eat your normal diet. If your stomach is upset, try bland, low-fat foods like plain rice, broiled chicken, toast, and yogurt.     · Drink plenty of fluids (unless your doctor tells you not to). Medicines    · Your doctor will tell you if and when you can restart your medicines. He or she will also give you instructions about taking any new medicines.     · If you take blood thinners, such as warfarin (Coumadin), clopidogrel (Plavix), or aspirin, be sure to talk to your doctor. He or she will tell you if and when to start taking those medicines again. Make sure that you understand exactly what your doctor wants you to do.     · Take pain medicines exactly as directed. ? If the doctor gave you a prescription medicine for pain, take it as prescribed. ? If you are not taking a prescription pain medicine, ask your doctor if you can take an over-the-counter medicine. ? Do not take aspirin, ibuprofen (Advil, Motrin), naproxen (Aleve), or other nonsteroidal anti-inflammatory drugs (NSAIDs) unless your doctor says it is okay.     · If you think your pain medicine is making you sick to your stomach:  ? Take your medicine after meals (unless your doctor has told you not to). ? Ask your doctor for a different pain medicine.     · If your doctor prescribed antibiotics, take them as directed. Do not stop taking them just because you feel better. You need to take the full course of antibiotics. Incision care    · Keep the area clean and dry.     · Ask your doctor when you can shower or take a bath.     · If you have strips of tape on the incision, leave the tape on for a week or until it falls off.     · Wash the area daily with warm, soapy water, and pat it dry. Don't use hydrogen peroxide or alcohol, which can slow healing.  You may cover the area with a gauze bandage if it weeps or rubs against clothing. Exercise    · Check with your doctor before you start an exercise program. Your doctor may recommend that you work with a physical therapist to learn how to exercise safely with an ICD. Other instructions    · Carry your ICD identification card with you at all times. The card should include the ICD  and model information.     · Wear medical alert jewelry that states you have an ICD. You can buy this at most drugstores.     · Have your ICD checked as often as your doctor recommends. In some cases, this may be done over the phone or the Internet. your doctor will give you instructions about how to do this.     · Talk with your doctor about the possibility of turning off the ICD at the end of life. You can put your wishes about the ICD in an advance directive. Follow-up care is a key part of your treatment and safety. Be sure to make and go to all appointments, and call your doctor if you are having problems. It's also a good idea to know your test results and keep a list of the medicines you take. When should you call for help? Call 911 anytime you think you may need emergency care. For example, call if:    · You passed out (lost consciousness).     · You have trouble breathing.    Call your doctor now or seek immediate medical care if:    · You receive a shock from your ICD.     · You are dizzy or lightheaded, or you feel like you may faint.     · You have pain that does not get better after you take pain medicine.     · You hear an alarm or feel a vibration from your ICD.     · You have loose stitches, or your incision comes open.     · Bright red blood has soaked through the bandage over your incision.     · You have signs of infection, such as:  ? Increased pain, swelling, warmth, or redness. ? Red streaks leading from the incision. ? Pus draining from the incision. ?  A fever.    Watch closely for changes in your health, and be sure to contact your doctor if you have any problems. Where can you learn more? Go to http://juan-andrei.info/. Enter K184 in the search box to learn more about \"Implantable Cardioverter-Defibrillator Placement: What to Expect at Home. \"  Current as of: December 6, 2017  Content Version: 11.8  © 3143-9702 [employer]. Care instructions adapted under license by [employer] (which disclaims liability or warranty for this information). If you have questions about a medical condition or this instruction, always ask your healthcare professional. [address] any warranty or liability for your use of this information. Avoiding Triggers With Heart Failure: Care Instructions  Your Care Instructions    Triggers are anything that make your heart failure flare up. A flare-up is also called \"sudden heart failure\" or \"acute heart failure. \" When you have a flare-up, fluid builds up in your lungs, and you have problems breathing. You might need to go to the hospital. By watching for changes in your condition and avoiding triggers, you can prevent heart failure flare-ups. Follow-up care is a key part of your treatment and safety. Be sure to make and go to all appointments, and call your doctor if you are having problems. It's also a good idea to know your test results and keep a list of the medicines you take. How can you care for yourself at home? Watch for changes in your weight and condition  · Weigh yourself without clothing at the same time each day. Record your weight. Call your doctor if you have sudden weight gain, such as more than 2 to 3 pounds in a day or 5 pounds in a week. (Your doctor may suggest a different range of weight gain.) A sudden weight gain may mean that your heart failure is getting worse. · Keep a daily record of your symptoms. Write down any changes in how you feel, such as new shortness of breath, cough, or problems eating.  Also record if your ankles are more swollen than usual and if you feel more tired than usual. Note anything that you ate or did that could have triggered these changes. Limit sodium  Sodium causes your body to hold on to extra water. This may cause your heart failure symptoms to get worse. People get most of their sodium from processed foods. Fast food and restaurant meals also tend to be very high in sodium. · Your doctor may suggest that you limit sodium to 2,000 milligrams (mg) a day or less. That is less than 1 teaspoon of salt a day, including all the salt you eat in cooking or in packaged foods. · Read food labels on cans and food packages. They tell you how much sodium you get in one serving. Check the serving size. If you eat more than one serving, you are getting more sodium. · Be aware that sodium can come in forms other than salt, including monosodium glutamate (MSG), sodium citrate, and sodium bicarbonate (baking soda). MSG is often added to Asian food. You can sometimes ask for food without MSG or salt. · Slowly reducing salt will help you adjust to the taste. Take the salt shaker off the table. · Flavor your food with garlic, lemon juice, onion, vinegar, herbs, and spices instead of salt. Do not use soy sauce, steak sauce, onion salt, garlic salt, mustard, or ketchup on your food, unless it is labeled \"low-sodium\" or \"low-salt. \"  · Make your own salad dressings, sauces, and ketchup without adding salt. · Use fresh or frozen ingredients, instead of canned ones, whenever you can. Choose low-sodium canned goods. · Eat less processed food and food from restaurants, including fast food. Exercise as directed  Moderate, regular exercise is very good for your heart. It improves your blood flow and helps control your weight. But too much exercise can stress your heart and cause a heart failure flare-up.   · Check with your doctor before you start an exercise program.  · Walking is an easy way to get exercise. Start out slowly. Gradually increase the length and pace of your walk. Swimming, riding a bike, and using a treadmill are also good forms of exercise. · When you exercise, watch for signs that your heart is working too hard. You are pushing yourself too hard if you cannot talk while you are exercising. If you become short of breath or dizzy or have chest pain, stop, sit down, and rest.  · Do not exercise when you do not feel well. Take medicines correctly  · Take your medicines exactly as prescribed. Call your doctor if you think you are having a problem with your medicine. · Make a list of all the medicines you take. Include those prescribed to you by other doctors and any over-the-counter medicines, vitamins, or supplements you take. Take this list with you when you go to any doctor. · Take your medicines at the same time every day. It may help you to post a list of all the medicines you take every day and what time of day you take them. · Make taking your medicine as simple as you can. Plan times to take your medicines when you are doing other things, such as eating a meal or getting ready for bed. This will make it easier to remember to take your medicines. · Get organized. Use helpful tools, such as daily or weekly pill containers. When should you call for help? Call 911 if you have symptoms of sudden heart failure such as:    · You have severe trouble breathing.     · You cough up pink, foamy mucus.     · You have a new irregular or rapid heartbeat.    Call your doctor now or seek immediate medical care if:    · You have new or increased shortness of breath.     · You are dizzy or lightheaded, or you feel like you may faint.     · You have sudden weight gain, such as more than 2 to 3 pounds in a day or 5 pounds in a week.  (Your doctor may suggest a different range of weight gain.)     · You have increased swelling in your legs, ankles, or feet.     · You are suddenly so tired or weak that you cannot do your usual activities.    Watch closely for changes in your health, and be sure to contact your doctor if you develop new symptoms. Where can you learn more? Go to http://juan-andrei.info/. Enter J764 in the search box to learn more about \"Avoiding Triggers With Heart Failure: Care Instructions. \"  Current as of: December 6, 2017  Content Version: 11.8  © 8451-3879 [employer]. Care instructions adapted under license by [employer] (which disclaims liability or warranty for this information). If you have questions about a medical condition or this instruction, always ask your healthcare professional. [address] any warranty or liability for your use of this information.

## 2018-12-19 VITALS
SYSTOLIC BLOOD PRESSURE: 98 MMHG | BODY MASS INDEX: 23.83 KG/M2 | HEART RATE: 91 BPM | WEIGHT: 148.3 LBS | DIASTOLIC BLOOD PRESSURE: 57 MMHG | OXYGEN SATURATION: 94 % | RESPIRATION RATE: 18 BRPM | HEIGHT: 66 IN | TEMPERATURE: 97.9 F

## 2018-12-19 LAB
MAGNESIUM SERPL-MCNC: 2 MG/DL (ref 1.8–2.4)
POTASSIUM SERPL-SCNC: 3.4 MMOL/L (ref 3.5–5.1)

## 2018-12-19 PROCEDURE — 77030012891

## 2018-12-19 PROCEDURE — 83735 ASSAY OF MAGNESIUM: CPT

## 2018-12-19 PROCEDURE — 36415 COLL VENOUS BLD VENIPUNCTURE: CPT

## 2018-12-19 PROCEDURE — 77030016152 HC PRSSR SYS POS VYRM -B

## 2018-12-19 PROCEDURE — 74011250637 HC RX REV CODE- 250/637: Performed by: PHYSICIAN ASSISTANT

## 2018-12-19 PROCEDURE — 84132 ASSAY OF SERUM POTASSIUM: CPT

## 2018-12-19 PROCEDURE — 97530 THERAPEUTIC ACTIVITIES: CPT

## 2018-12-19 PROCEDURE — 97110 THERAPEUTIC EXERCISES: CPT

## 2018-12-19 PROCEDURE — 74011250637 HC RX REV CODE- 250/637: Performed by: THORACIC SURGERY (CARDIOTHORACIC VASCULAR SURGERY)

## 2018-12-19 PROCEDURE — 94640 AIRWAY INHALATION TREATMENT: CPT

## 2018-12-19 RX ORDER — CARVEDILOL 6.25 MG
6.25 TABLET ORAL 2 TIMES DAILY WITH MEALS
COMMUNITY
End: 2019-11-05 | Stop reason: SDUPTHER

## 2018-12-19 RX ORDER — AMOXICILLIN 250 MG
2 CAPSULE ORAL DAILY PRN
Status: DISCONTINUED | OUTPATIENT
Start: 2018-12-19 | End: 2018-12-19 | Stop reason: HOSPADM

## 2018-12-19 RX ORDER — OXYCODONE AND ACETAMINOPHEN 5; 325 MG/1; MG/1
1 TABLET ORAL
Qty: 16 TAB | Refills: 0 | Status: SHIPPED | OUTPATIENT
Start: 2018-12-19 | End: 2019-01-03

## 2018-12-19 RX ADMIN — AMIODARONE HYDROCHLORIDE 200 MG: 200 TABLET ORAL at 08:46

## 2018-12-19 RX ADMIN — OXYCODONE HYDROCHLORIDE AND ACETAMINOPHEN 1 TABLET: 5; 325 TABLET ORAL at 00:27

## 2018-12-19 RX ADMIN — LISINOPRIL 2.5 MG: 5 TABLET ORAL at 08:47

## 2018-12-19 RX ADMIN — PANTOPRAZOLE SODIUM 40 MG: 40 TABLET, DELAYED RELEASE ORAL at 05:55

## 2018-12-19 RX ADMIN — FUROSEMIDE 40 MG: 40 TABLET ORAL at 08:46

## 2018-12-19 RX ADMIN — OXYCODONE HYDROCHLORIDE AND ACETAMINOPHEN 1 TABLET: 5; 325 TABLET ORAL at 06:04

## 2018-12-19 RX ADMIN — POTASSIUM CHLORIDE 10 MEQ: 20 TABLET, EXTENDED RELEASE ORAL at 08:48

## 2018-12-19 RX ADMIN — FAMOTIDINE 20 MG: 20 TABLET ORAL at 08:46

## 2018-12-19 RX ADMIN — Medication 10 ML: at 05:55

## 2018-12-19 RX ADMIN — OXYCODONE HYDROCHLORIDE AND ACETAMINOPHEN 1 TABLET: 5; 325 TABLET ORAL at 10:37

## 2018-12-19 RX ADMIN — CITALOPRAM HYDROBROMIDE 10 MG: 10 TABLET ORAL at 08:46

## 2018-12-19 RX ADMIN — CARVEDILOL 3.12 MG: 3.12 TABLET, FILM COATED ORAL at 08:46

## 2018-12-19 RX ADMIN — POTASSIUM CHLORIDE 40 MEQ: 20 TABLET, EXTENDED RELEASE ORAL at 08:44

## 2018-12-19 NOTE — PROGRESS NOTES
Care Management Interventions  PCP Verified by CM: Yes(confirms Dr. Mario Ramirez)  Palliative Care Criteria Met (RRAT>21 & CHF Dx)?: No(RRAT 16 Dx CABG)  Mode of Transport at Discharge: Other (see comment)(family )  Transition of Care Consult (CM Consult): [address]: No  Reason Outside [place]: Physician referred to specific agency  Discharge Durable Medical Equipment: No  Physical Therapy Consult: Yes  Occupational Therapy Consult: No  Speech Therapy Consult: No  Current Support Network: Lives with Spouse, Own Home(lives with spouse, Edmundo Lynne -[phone])  Confirm Follow Up Transport: Family  Plan discussed with Pt/Family/Caregiver: Yes  Freedom of Choice Offered: Yes  [employer] Information Provided?: (confirms MCR/ supplemental - able to get rx with drug plan)  Discharge Location  Discharge Placement: Home with home health  Met with patient for d/c. Patient medically ready for d/c today. Patient is in agreement with d/c plan home with home health and [address] [place] [address] agency. Referral sent to [facility] with confirmation received of referral. Patient voices no concerns or needs. Patient to d/c home with [address] [place] [address]. States her  is to provide transportation home.

## 2018-12-19 NOTE — PROGRESS NOTES
Problem: Mobility Impaired (Adult and Pediatric)  Goal: *Acute Goals and Plan of Care (Insert Text)  LTG:  (1.)Ms. Amado Don will move from supine to sit and sit to supine , scoot up and down and roll side to side in flat bed without siderails with  INDEPENDENT within 7 day(s). (2.)Ms. Amado Don will perform all functional transfers with  INDEPENDENT using the least restrictive/no device within 7 day(s). (3.)Ms. Amado Don will ambulate with  INDEPENDENT for 500+ feet with normal vital sign response with the least restrictive/no device within 7 day(s). (4.)Ms. Amado Don will ambulate up/down 2 steps with bilateral  railing with  MODIFIED INDEPENDENCE with no device within 7 day(s). PHYSICAL THERAPY: Daily Note, Treatment Day: 1st, AM 12/19/2018  INPATIENT: Hospital Day: 6  [place] / [place] / Product Type: Medicare /   ICD precautions L UE   NAME/AGE/GENDER: Nixon Arias is a 70 y.o. female   PRIMARY DIAGNOSIS: Atherosclerosis of native coronary artery of native heart with unstable angina pectoris (HCC) [I25.110] S/P CABG x 4 S/P CABG x 4  Procedure(s) (LRB):  ICD (N/A)  5 Days Post-Op  ICD-10: Treatment Diagnosis:    · Other abnormalities of gait and mobility (R26.89)   Precaution/Allergies:  Codeine; Cenestin [synthetic conj estrogens a]; Doxycycline; Levaquin [levofloxacin]; Pcn [penicillins]; and Sulfa dyne      ASSESSMENT:     Ms. Amado Don presents lying supine in bed. She is agreeable to treatment today. She increased her ambulation distance with no assistive device and SBA. She returned to room and participated in seated exercises. She demonstrated good technique with exercises. She is progressing well toward all goals. Will continue PT efforts. This section established at most recent assessment   PROBLEM LIST (Impairments causing functional limitations):  1. Decreased ADL/Functional Activities  2. Decreased Transfer Abilities  3.  Decreased Ambulation Ability/Technique  4. Increased Pain  5. Decreased Activity Tolerance  6. Decreased Knowledge of Precautions   INTERVENTIONS PLANNED: (Benefits and precautions of physical therapy have been discussed with the patient.)  1. Balance Exercise  2. Bed Mobility  3. Gait Training  4. Home Exercise Program (HEP)  5. Therapeutic Activites  6. Therapeutic Exercise/Strengthening  7. Transfer Training  8. education     TREATMENT PLAN: Frequency/Duration:daily to twice daily for 1 week  Rehabilitation Potential For Stated Goals: Excellent     RECOMMENDED REHABILITATION/EQUIPMENT: (at time of discharge pending progress): Due to the probability of continued deficits (see above) this patient will not likely need continued skilled physical therapy after discharge. Equipment:    None at this time              HISTORY:   History of Present Injury/Illness (Reason for Referral): Admitted for above surgeries. Past Medical History/Comorbidities:   Ms. Keely Jose  has a past medical history of Abnormal stress echo, Anxiety, Arrhythmia, BCC (basal cell carcinoma of skin), CAD Nonobstructive 10-20% LAD on CTA Coronary, Chronic insomnia, Dyslipidemia, Fibromyalgia, GERD (gastroesophageal reflux disease), IBS (irritable bowel syndrome), Ischemic colitis ([address] Osteopenia, Overactive bladder, Psychiatric disorder, SCCA (squamous cell carcinoma) of skin, and Systolic congestive heart failure ([address] Ms. Keely Jose  has a past surgical history that includes hx tonsillectomy; implant breast silicone/eq (Bilateral, ); hx colonoscopy (); hx kym and bso (); hx  section (); hx breast biopsy (Left, ); hx breast augmentation (); ICD (N/A, 2018); CORONARY ARTERY BYPASS GRAFT X  3 , [place] (N/A, 2018); VEIN HARVEST GREATER SAPHENOUS (Left, 2018); and ESOPHAGEAL TRANS ECHOCARDIOGRAM (N/A, 2018).   Social History/Living Environment:   Home Environment: Private residence  [address] [place] [address]: [facility]  # of Interior Steps: 2  Interior Rails: Both  Living Alone: No  Support Systems: Spouse/Significant Other/Partner  Patient Expects to be Discharged to[de-identified] Private residence  Current DME Used/Available at Home: None  Prior Level of Function/Work/Activity:  Lives at home with spouse, independent in home and community. Drives, etc.  Dominant Side:         RIGHT   Number of Personal Factors/Comorbidities that affect the Plan of Care: 3+: HIGH COMPLEXITY   EXAMINATION:   Most Recent Physical Functioning:   Gross Assessment:                  Posture:  Posture (WDL): Exceptions to WDL  Posture Assessment: Forward head, Rounded shoulders  Balance:  Sitting: Intact  Standing: Intact Bed Mobility:  Supine to Sit: Stand-by assistance(with head of bed elevated)  Wheelchair Mobility:     Transfers:  Sit to Stand: Supervision  Stand to Sit: Supervision  Gait:     Base of Support: Widened  Speed/Pepper: Pace decreased (<100 feet/min)  Step Length: Left shortened;Right shortened  Distance (ft): 460 Feet (ft)  Ambulation - Level of Assistance: Stand-by assistance      Body Structures Involved:  1. Heart Body Functions Affected:  1. Sensory/Pain  2. Cardio  3. Movement Related Activities and Participation Affected:  1. Mobility  2. Self Care  3. Domestic Life  4. Community, Social and [employer] [place]   Number of elements that affect the Plan of Care: 4+: HIGH COMPLEXITY   CLINICAL PRESENTATION:   Presentation: Stable and uncomplicated: LOW COMPLEXITY   CLINICAL DECISION MAKIN [facility] Mobility Inpatient Short Form  How much difficulty does the patient currently have. .. Unable A Lot A Little None   1. Turning over in bed (including adjusting bedclothes, sheets and blankets)? [] 1   [] 2   [x] 3   [] 4   2. Sitting down on and standing up from a chair with arms ( e.g., wheelchair, bedside commode, etc.)   [] 1   [] 2   [x] 3   [] 4   3. Moving from lying on back to sitting on the side of the bed? [] 1   [] 2   [x] 3   [] 4   How much help from another person does the patient currently need. .. Total A Lot A Little None   4. Moving to and from a bed to a chair (including a wheelchair)? [] 1   [] 2   [x] 3   [] 4   5. Need to walk in hospital room? [] 1   [] 2   [x] 3   [] 4   6. Climbing 3-5 steps with a railing? [] 1   [] 2   [x] 3   [] 4   © 2007, Trustees of [facility], under license to [employer]. All rights reserved      Score:  Initial: 18 Most Recent: X (Date: -- )    Interpretation of Tool:  Represents activities that are increasingly more difficult (i.e. Bed mobility, Transfers, Gait). Score 24 23 22-20 19-15 14-10 9-7 6     Modifier CH CI CJ CK CL CM CN      ? Mobility - Walking and Moving Around:     - CURRENT STATUS: CK - 40%-59% impaired, limited or restricted    - GOAL STATUS: CJ - 20%-39% impaired, limited or restricted    - D/C STATUS:  ---------------To be determined---------------  [place] / [place] / Product Type: Medicare /      Medical Necessity:     · Patient is expected to demonstrate progress in strength and functional technique to increase independence with   and improve safety during all functional mobility. Reason for Services/Other Comments:  · Patient continues to require skilled intervention due to medical complications. Use of outcome tool(s) and clinical judgement create a POC that gives a: Clear prediction of patient's progress: LOW COMPLEXITY            TREATMENT:   (In addition to Assessment/Re-Assessment sessions the following treatments were rendered)   Pre-treatment Symptoms/Complaints:  \"I am not coordinated. \"  Pain: Initial:   Pain Intensity 1: 0  Post Session:  None noted      Therapeutic Activity: (    15 Minutes): Therapeutic activities including Bed transfers, Chair transfers, Toilet transfers and Ambulation on level ground to improve mobility, strength and activity tolerance.   Required minimal verbal cues   to for technique for sit to supine transfer. Therapeutic Exercise: (15 Minutes ):  Exercises per grid below to improve mobility, strength and balance. Required minimal visual and verbal cues to promote proper body alignment. Progressed complexity of movement as indicated. Hands on table top for balance. Date: 12/18/18 12-19-18       Ambulation  Device  assistance         Partial Squats         Hip Abduction/ Adduction Standing         Heel Raises  Standing x20 AB        Toe Raises Standing x20 AB        Hip Flexion Standing x20 AB        Seated LAQ  x20       Seated heel raises  x20       Seated toe raises  x20       Seated marching  x20       Seated hip abduction  x20       Shoulder shrugs  x20       Key:  R=right, L=left, B=bilaterally, A=active, AA=active assisted, P=passive      Braces/Orthotics/Lines/Etc:   · monitor  · O2 Device: Room air  Treatment/Session Assessment:    · Response to Treatment:  Pleasant and cooperative. · Interdisciplinary Collaboration:   o Physical Therapy Assistant  o Registered Nurse  · After treatment position/precautions:   o Up in chair  o Bed/Chair-wheels locked  o Call light within reach  o RN notified  o Family at bedside   · Compliance with Program/Exercises: Compliant all of the time  · Recommendations/Intent for next treatment session: \"Next visit will focus on advancements to more challenging activities and reduction in assistance provided\".   Total Treatment Duration:  PT Patient Time In/Time Out  Time In: 0845  Time Out: Belinda 258, PTA

## 2018-12-19 NOTE — PROGRESS NOTES
Bedside and Verbal shift change report given to Izabela Aceves (oncoming nurse) by self Drake Canales nurse). Report included the following information SBAR, Kardex, Intake/Output, MAR and Recent Results.

## 2018-12-19 NOTE — PROGRESS NOTES
Discharge instructions reviewed with patient and her . Medications, incision care, appointments and activity restrictions discussed. Time allowed for questions. Patient escorted to discharge area via wheelchair where her  drove her home.

## 2018-12-28 PROBLEM — I42.0 DILATED CARDIOMYOPATHY (HCC): Status: ACTIVE | Noted: 2018-12-28

## 2018-12-28 PROBLEM — I44.2 COMPLETE HEART BLOCK (HCC): Status: RESOLVED | Noted: 2018-12-18 | Resolved: 2018-12-28

## 2018-12-28 PROBLEM — I25.119 CORONARY ARTERY DISEASE INVOLVING NATIVE CORONARY ARTERY OF NATIVE HEART WITH ANGINA PECTORIS (HCC): Status: RESOLVED | Noted: 2018-10-31 | Resolved: 2018-12-28

## 2019-01-03 ENCOUNTER — HOSPITAL ENCOUNTER (OUTPATIENT)
Dept: CARDIAC REHAB | Age: 64
Discharge: HOME | End: 2019-01-03

## 2019-01-03 NOTE — CARDIO/PULMONARY
Dear Dr. Venecia Reeder: Your patient, Paco Curry (: 1955), has taken the PHQ-9 as part of her admission to the Cardiac Rehab program. The results of this test indicate that she may be experiencing stress and/or depression. In our discussion, when asked if she was experiencing anxiety, depression, panic attacks or poor coping with daily life, she said panic attacks. Our medication list shows that the patient is taking Celexa 10 mg daily. She states that Celexa is helping take the edge off. She feels her score on the PHQ-9 is related to her recent open heart surgery and not being able to do all of the things she used to be able to do. Her activity level, sleep and appetite have all been impacted negatively. It is our program protocol to contact the patient's PCP if the PHQ-9 score is greater than 4; her score was 11. We want you to be aware of her score and our discussion today. Thank you.     Emma Lucas, MARIAMN, RN  Cardiopulmonary Rehabilitation

## 2019-01-03 NOTE — CARDIO/PULMONARY
Dear Dr. Bozena Mcdaniel: Thank you for referring your patient, Sharad Holly (: 1955), to the Cardiopulmonary Rehabilitation Program at [facility].  Mrs. Veronica Wilson is a good candidate for the Cardiac Rehab Program and should see improvements with regular participation. We will be addressing appropriate interventions for modifiable risk factors with your patient during the next 12 weeks. We will contact you with any issues or concerns that may arise, or you can follow your patients progress through [facility] at any time. A final summary will be available on [facility] when the program is completed. Again, thank you for your referral. If we can be of further assistance, please feel free to contact the Cardiopulmonary Rehab staff at [phone].     Sincerely,    VAZQUEZ Gordillo, RN  Cardiopulmonary Rehabilitation Nurse  [facility]

## 2019-01-09 ENCOUNTER — HOSPITAL ENCOUNTER (OUTPATIENT)
Dept: CARDIAC REHAB | Age: 64
Discharge: HOME | End: 2019-01-09
Payer: MEDICARE

## 2019-01-09 VITALS — WEIGHT: 137 LBS | HEIGHT: 66 IN | BODY MASS INDEX: 22.02 KG/M2

## 2019-01-09 PROCEDURE — 93798 PHYS/QHP OP CAR RHAB W/ECG: CPT

## 2019-01-11 ENCOUNTER — HOSPITAL ENCOUNTER (OUTPATIENT)
Dept: CARDIAC REHAB | Age: 64
Discharge: HOME | End: 2019-01-11
Payer: MEDICARE

## 2019-01-11 PROCEDURE — 93798 PHYS/QHP OP CAR RHAB W/ECG: CPT

## 2019-01-14 ENCOUNTER — HOSPITAL ENCOUNTER (OUTPATIENT)
Dept: CARDIAC REHAB | Age: 64
Discharge: HOME | End: 2019-01-14
Payer: MEDICARE

## 2019-01-14 PROCEDURE — 93798 PHYS/QHP OP CAR RHAB W/ECG: CPT

## 2019-01-16 ENCOUNTER — HOSPITAL ENCOUNTER (OUTPATIENT)
Dept: CARDIAC REHAB | Age: 64
Discharge: HOME | End: 2019-01-16
Payer: MEDICARE

## 2019-01-16 VITALS — WEIGHT: 137 LBS | BODY MASS INDEX: 22.11 KG/M2

## 2019-01-16 PROCEDURE — 93798 PHYS/QHP OP CAR RHAB W/ECG: CPT

## 2019-01-18 ENCOUNTER — HOSPITAL ENCOUNTER (OUTPATIENT)
Dept: CARDIAC REHAB | Age: 64
End: 2019-01-18
Payer: MEDICARE

## 2019-01-21 ENCOUNTER — HOSPITAL ENCOUNTER (OUTPATIENT)
Dept: CARDIAC REHAB | Age: 64
End: 2019-01-21
Payer: MEDICARE

## 2019-01-23 ENCOUNTER — HOSPITAL ENCOUNTER (OUTPATIENT)
Dept: CARDIAC REHAB | Age: 64
End: 2019-01-23
Payer: MEDICARE

## 2019-01-25 ENCOUNTER — HOSPITAL ENCOUNTER (OUTPATIENT)
Dept: CARDIAC REHAB | Age: 64
End: 2019-01-25
Payer: MEDICARE

## 2019-01-25 ENCOUNTER — TELEPHONE (OUTPATIENT)
Dept: CARDIAC REHAB | Age: 64
End: 2019-01-25

## 2019-01-28 ENCOUNTER — HOSPITAL ENCOUNTER (OUTPATIENT)
Dept: CARDIAC REHAB | Age: 64
End: 2019-01-28
Payer: MEDICARE

## 2019-01-28 ENCOUNTER — HOSPITAL ENCOUNTER (OUTPATIENT)
Dept: LAB | Age: 64
Discharge: HOME | End: 2019-01-28
Attending: INTERNAL MEDICINE
Payer: MEDICARE

## 2019-01-28 DIAGNOSIS — I25.10 CORONARY ARTERY DISEASE INVOLVING NATIVE CORONARY ARTERY OF NATIVE HEART WITHOUT ANGINA PECTORIS: ICD-10-CM

## 2019-01-28 DIAGNOSIS — I50.22 CHRONIC SYSTOLIC CONGESTIVE HEART FAILURE (HCC): ICD-10-CM

## 2019-01-28 DIAGNOSIS — E78.5 DYSLIPIDEMIA: ICD-10-CM

## 2019-01-28 LAB
ALBUMIN SERPL-MCNC: 3.3 G/DL (ref 3.2–4.6)
ALBUMIN/GLOB SERPL: 0.8 {RATIO}
ALP SERPL-CCNC: 84 U/L (ref 50–136)
ALT SERPL-CCNC: 17 U/L (ref 12–65)
ANION GAP SERPL CALC-SCNC: 6 MMOL/L
AST SERPL-CCNC: 13 U/L (ref 15–37)
BASOPHILS # BLD: 0 K/UL (ref 0–0.2)
BASOPHILS NFR BLD: 1 % (ref 0–2)
BILIRUB SERPL-MCNC: 0.2 MG/DL (ref 0.2–1.1)
BNP SERPL-MCNC: 142 PG/ML
BUN SERPL-MCNC: 13 MG/DL (ref 8–23)
CALCIUM SERPL-MCNC: 8.9 MG/DL (ref 8.3–10.4)
CHLORIDE SERPL-SCNC: 104 MMOL/L (ref 98–107)
CHOLEST SERPL-MCNC: 217 MG/DL
CO2 SERPL-SCNC: 29 MMOL/L (ref 21–32)
CREAT SERPL-MCNC: 0.8 MG/DL (ref 0.6–1)
DIFFERENTIAL METHOD BLD: NORMAL
EOSINOPHIL # BLD: 0.4 K/UL (ref 0–0.8)
EOSINOPHIL NFR BLD: 6 % (ref 0.5–7.8)
ERYTHROCYTE [DISTWIDTH] IN BLOOD BY AUTOMATED COUNT: 12.7 % (ref 11.9–14.6)
GLOBULIN SER CALC-MCNC: 4.2 G/DL
GLUCOSE SERPL-MCNC: 98 MG/DL (ref 65–100)
HCT VFR BLD AUTO: 38.9 % (ref 35.8–46.3)
HDLC SERPL-MCNC: 41 MG/DL (ref 40–60)
HDLC SERPL: 5.3 {RATIO}
HGB BLD-MCNC: 12.4 G/DL (ref 11.7–15.4)
IMM GRANULOCYTES # BLD AUTO: 0 K/UL (ref 0–0.5)
IMM GRANULOCYTES NFR BLD AUTO: 1 % (ref 0–5)
LDLC SERPL CALC-MCNC: 135.8 MG/DL
LIPID PROFILE,FLP: ABNORMAL
LYMPHOCYTES # BLD: 1.9 K/UL (ref 0.5–4.6)
LYMPHOCYTES NFR BLD: 28 % (ref 13–44)
MAGNESIUM SERPL-MCNC: 2.3 MG/DL (ref 1.8–2.4)
MCH RBC QN AUTO: 29.1 PG (ref 26.1–32.9)
MCHC RBC AUTO-ENTMCNC: 31.9 G/DL (ref 31.4–35)
MCV RBC AUTO: 91.3 FL (ref 79.6–97.8)
MONOCYTES # BLD: 0.6 K/UL (ref 0.1–1.3)
MONOCYTES NFR BLD: 9 % (ref 4–12)
NEUTS SEG # BLD: 3.6 K/UL (ref 1.7–8.2)
NEUTS SEG NFR BLD: 55 % (ref 43–78)
NRBC # BLD: 0 K/UL (ref 0–0.2)
PLATELET # BLD AUTO: 282 K/UL (ref 150–450)
PMV BLD AUTO: 11.2 FL (ref 9.4–12.3)
POTASSIUM SERPL-SCNC: 4.4 MMOL/L (ref 3.5–5.1)
PROT SERPL-MCNC: 7.5 G/DL (ref 6.3–8.2)
RBC # BLD AUTO: 4.26 M/UL (ref 4.05–5.2)
SODIUM SERPL-SCNC: 139 MMOL/L (ref 136–145)
TRIGL SERPL-MCNC: 201 MG/DL (ref 35–150)
TSH SERPL DL<=0.005 MIU/L-ACNC: 1.57 UIU/ML (ref 0.36–3.74)
VLDLC SERPL CALC-MCNC: 40.2 MG/DL (ref 6–23)
WBC # BLD AUTO: 6.6 K/UL (ref 4.3–11.1)

## 2019-01-28 PROCEDURE — 83735 ASSAY OF MAGNESIUM: CPT

## 2019-01-28 PROCEDURE — 85025 COMPLETE CBC W/AUTO DIFF WBC: CPT

## 2019-01-28 PROCEDURE — 83880 ASSAY OF NATRIURETIC PEPTIDE: CPT

## 2019-01-28 PROCEDURE — 84443 ASSAY THYROID STIM HORMONE: CPT

## 2019-01-28 PROCEDURE — 80053 COMPREHEN METABOLIC PANEL: CPT

## 2019-01-28 PROCEDURE — 36415 COLL VENOUS BLD VENIPUNCTURE: CPT

## 2019-01-28 PROCEDURE — 80061 LIPID PANEL: CPT

## 2019-01-30 ENCOUNTER — HOSPITAL ENCOUNTER (OUTPATIENT)
Dept: CARDIAC REHAB | Age: 64
End: 2019-01-30
Payer: MEDICARE

## 2019-02-01 ENCOUNTER — HOSPITAL ENCOUNTER (OUTPATIENT)
Dept: CARDIAC REHAB | Age: 64
Discharge: HOME | End: 2019-02-01
Payer: MEDICARE

## 2019-02-01 PROCEDURE — 93798 PHYS/QHP OP CAR RHAB W/ECG: CPT

## 2019-02-04 ENCOUNTER — HOSPITAL ENCOUNTER (OUTPATIENT)
Dept: CARDIAC REHAB | Age: 64
Discharge: HOME | End: 2019-02-04
Payer: MEDICARE

## 2019-02-04 PROCEDURE — 93798 PHYS/QHP OP CAR RHAB W/ECG: CPT

## 2019-02-06 ENCOUNTER — HOSPITAL ENCOUNTER (OUTPATIENT)
Dept: CARDIAC REHAB | Age: 64
Discharge: HOME | End: 2019-02-06
Payer: MEDICARE

## 2019-02-06 VITALS — WEIGHT: 136 LBS | BODY MASS INDEX: 21.95 KG/M2

## 2019-02-06 PROCEDURE — 93798 PHYS/QHP OP CAR RHAB W/ECG: CPT

## 2019-02-11 ENCOUNTER — HOSPITAL ENCOUNTER (OUTPATIENT)
Dept: CARDIAC REHAB | Age: 64
Discharge: HOME | End: 2019-02-11
Payer: MEDICARE

## 2019-02-11 PROCEDURE — 93798 PHYS/QHP OP CAR RHAB W/ECG: CPT

## 2019-02-13 ENCOUNTER — HOSPITAL ENCOUNTER (OUTPATIENT)
Dept: CARDIAC REHAB | Age: 64
Discharge: HOME | End: 2019-02-13
Payer: MEDICARE

## 2019-02-13 VITALS — WEIGHT: 137.4 LBS | BODY MASS INDEX: 22.18 KG/M2

## 2019-02-13 PROCEDURE — 93798 PHYS/QHP OP CAR RHAB W/ECG: CPT

## 2019-02-15 ENCOUNTER — HOSPITAL ENCOUNTER (OUTPATIENT)
Dept: CARDIAC REHAB | Age: 64
Discharge: HOME | End: 2019-02-15
Payer: MEDICARE

## 2019-02-15 PROCEDURE — 93798 PHYS/QHP OP CAR RHAB W/ECG: CPT

## 2019-02-18 ENCOUNTER — HOSPITAL ENCOUNTER (OUTPATIENT)
Dept: CARDIAC REHAB | Age: 64
Discharge: HOME | End: 2019-02-18
Payer: MEDICARE

## 2019-02-18 PROCEDURE — 93798 PHYS/QHP OP CAR RHAB W/ECG: CPT

## 2019-02-20 ENCOUNTER — HOSPITAL ENCOUNTER (OUTPATIENT)
Dept: CARDIAC REHAB | Age: 64
Discharge: HOME | End: 2019-02-20
Payer: MEDICARE

## 2019-02-20 VITALS — BODY MASS INDEX: 21.6 KG/M2 | WEIGHT: 133.8 LBS

## 2019-02-20 PROBLEM — R09.02 HYPOXIA: Status: RESOLVED | Noted: 2018-12-14 | Resolved: 2019-02-20

## 2019-02-20 PROBLEM — Z99.11 ENCOUNTER FOR WEANING FROM VENTILATOR (HCC): Status: RESOLVED | Noted: 2018-12-14 | Resolved: 2019-02-20

## 2019-02-20 PROBLEM — Z95.1 S/P CABG X 4: Status: RESOLVED | Noted: 2018-12-14 | Resolved: 2019-02-20

## 2019-02-20 PROCEDURE — 93798 PHYS/QHP OP CAR RHAB W/ECG: CPT

## 2019-02-22 ENCOUNTER — HOSPITAL ENCOUNTER (OUTPATIENT)
Dept: CARDIAC REHAB | Age: 64
Discharge: HOME | End: 2019-02-22
Payer: MEDICARE

## 2019-02-22 PROCEDURE — 93798 PHYS/QHP OP CAR RHAB W/ECG: CPT

## 2019-02-25 ENCOUNTER — HOSPITAL ENCOUNTER (OUTPATIENT)
Dept: CARDIAC REHAB | Age: 64
Discharge: HOME | End: 2019-02-25
Payer: MEDICARE

## 2019-02-25 PROCEDURE — 93798 PHYS/QHP OP CAR RHAB W/ECG: CPT

## 2019-02-25 NOTE — PROGRESS NOTES
70year old female, s/p CABG x3 on 12/14/18 enrolled in cardiac rehab, seen today for initial nutrition counseling. Energy level improving and good appetite today. Stated nutrition goals are to continue heart healthy nutrition. Medical History:  Dyslipidemia, CAD, IBS, Anxiety, osteopenia, GERD, skin cancer. Nutrition related medications/supplements: dislikes swallowing pills. 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 Lives with  who is a diabetic. Food and Nutrition Intake History:  
Food purchases and prepares most meals at home. Since surgery, she has decreased her intake of saturated fats, refined sugars and salty chips. Diet Recall: Breakfast: English Muffin, scrambled egg, 1/2 slice of cheese, almond milk. Snack: pear, Lunch: grilled chicken salad with diluted thousand island dressing, water; Snack: cheese and crackers + water Dinner: Grilled chicken, carrot salad, water, chocolate and coke life. Alcohol use: occasionally drinks beer or wine. GI:  Healthy since the surgery. Moves every day or every other. Anthropometric Data: 
Height as of 2/20/19: 5' 6\" (1.676 m). Weight as of 2/20/19: 61.2 kg (135 lb). BMI:  21.79 kg/m²; healthy Waist measurement (inches): 30  
 
Estimated Nutritional Needs: 
Calories: MSJ x 1.3-1.4 for weight maintenance: 3986-5078 kcal/day Protein:  50-61g/day Stage of Behavior Change: Action Nutrition Diagnosis:  Inadequate intake of nutrient (Omega 3 Fatty Acid) related to food knowledge AEB recall, and lipid labs. Nutrition Intervention: 
Reviewed lab/body comp results/goals, and heart healthy eating pattern. Reviewed added sugar, saturated vs. unsaturated fats and recommendations for omega 3 fatty acids. Reviewed label reading. Handouts: lab/body comp, omega 3 fatty acid. Nutrition Goals: improve diet quality by incorporating heart healthy fats daily by the end of cardiac rehab. Monitoring/Evaluation:  RD to follow up with participant during cardiac rehab sessions for questions and assessment of progression toward goals. Compliance: Pt agreeable to plan. No barriers to achieving goal at this time.   
 
Thuy Portillo, MS, RD LD

## 2019-02-27 ENCOUNTER — HOSPITAL ENCOUNTER (OUTPATIENT)
Dept: CARDIAC REHAB | Age: 64
Discharge: HOME | End: 2019-02-27
Payer: MEDICARE

## 2019-02-27 VITALS — BODY MASS INDEX: 21.6 KG/M2 | WEIGHT: 133.8 LBS

## 2019-02-27 PROCEDURE — 93798 PHYS/QHP OP CAR RHAB W/ECG: CPT

## 2019-03-01 ENCOUNTER — HOSPITAL ENCOUNTER (OUTPATIENT)
Dept: CARDIAC REHAB | Age: 64
Discharge: HOME | End: 2019-03-01
Payer: MEDICARE

## 2019-03-01 PROCEDURE — 93798 PHYS/QHP OP CAR RHAB W/ECG: CPT

## 2019-03-04 ENCOUNTER — HOSPITAL ENCOUNTER (OUTPATIENT)
Dept: CARDIAC REHAB | Age: 64
Discharge: HOME | End: 2019-03-04
Payer: MEDICARE

## 2019-03-04 PROCEDURE — 93798 PHYS/QHP OP CAR RHAB W/ECG: CPT

## 2019-03-06 ENCOUNTER — HOSPITAL ENCOUNTER (OUTPATIENT)
Dept: CARDIAC REHAB | Age: 64
Discharge: HOME | End: 2019-03-06
Payer: MEDICARE

## 2019-03-06 VITALS — WEIGHT: 134.4 LBS | BODY MASS INDEX: 21.69 KG/M2

## 2019-03-06 PROCEDURE — 93798 PHYS/QHP OP CAR RHAB W/ECG: CPT

## 2019-03-08 ENCOUNTER — HOSPITAL ENCOUNTER (OUTPATIENT)
Dept: CARDIAC REHAB | Age: 64
Discharge: HOME | End: 2019-03-08
Payer: MEDICARE

## 2019-03-08 PROCEDURE — 93798 PHYS/QHP OP CAR RHAB W/ECG: CPT

## 2019-03-11 ENCOUNTER — HOSPITAL ENCOUNTER (OUTPATIENT)
Dept: CARDIAC REHAB | Age: 64
Discharge: HOME | End: 2019-03-11
Payer: MEDICARE

## 2019-03-11 PROCEDURE — 93798 PHYS/QHP OP CAR RHAB W/ECG: CPT

## 2019-03-13 ENCOUNTER — HOSPITAL ENCOUNTER (OUTPATIENT)
Dept: CARDIAC REHAB | Age: 64
Discharge: HOME | End: 2019-03-13
Payer: MEDICARE

## 2019-03-13 VITALS — WEIGHT: 134.4 LBS | BODY MASS INDEX: 21.69 KG/M2

## 2019-03-13 PROCEDURE — 93798 PHYS/QHP OP CAR RHAB W/ECG: CPT

## 2019-03-15 ENCOUNTER — HOSPITAL ENCOUNTER (OUTPATIENT)
Dept: CARDIAC REHAB | Age: 64
Discharge: HOME | End: 2019-03-15
Payer: MEDICARE

## 2019-03-15 PROCEDURE — 93798 PHYS/QHP OP CAR RHAB W/ECG: CPT

## 2019-03-18 ENCOUNTER — HOSPITAL ENCOUNTER (OUTPATIENT)
Dept: CARDIAC REHAB | Age: 64
Discharge: HOME | End: 2019-03-18
Payer: MEDICARE

## 2019-03-18 PROCEDURE — 93798 PHYS/QHP OP CAR RHAB W/ECG: CPT

## 2019-03-20 ENCOUNTER — HOSPITAL ENCOUNTER (OUTPATIENT)
Dept: CARDIAC REHAB | Age: 64
Discharge: HOME | End: 2019-03-20
Payer: MEDICARE

## 2019-03-20 VITALS — BODY MASS INDEX: 21.73 KG/M2 | WEIGHT: 134.6 LBS

## 2019-03-20 PROCEDURE — 93798 PHYS/QHP OP CAR RHAB W/ECG: CPT

## 2019-03-22 ENCOUNTER — HOSPITAL ENCOUNTER (OUTPATIENT)
Dept: CARDIAC REHAB | Age: 64
End: 2019-03-22
Payer: MEDICARE

## 2019-03-25 ENCOUNTER — APPOINTMENT (OUTPATIENT)
Dept: CARDIAC REHAB | Age: 64
End: 2019-03-25
Payer: MEDICARE

## 2019-03-27 ENCOUNTER — HOSPITAL ENCOUNTER (OUTPATIENT)
Dept: CARDIAC REHAB | Age: 64
Discharge: HOME | End: 2019-03-27
Payer: MEDICARE

## 2019-03-27 VITALS — WEIGHT: 136.8 LBS | BODY MASS INDEX: 22.08 KG/M2

## 2019-03-27 PROCEDURE — 93798 PHYS/QHP OP CAR RHAB W/ECG: CPT

## 2019-03-29 ENCOUNTER — HOSPITAL ENCOUNTER (OUTPATIENT)
Dept: CARDIAC REHAB | Age: 64
Discharge: HOME | End: 2019-03-29
Payer: MEDICARE

## 2019-03-29 PROCEDURE — 93798 PHYS/QHP OP CAR RHAB W/ECG: CPT

## 2019-04-01 ENCOUNTER — HOSPITAL ENCOUNTER (OUTPATIENT)
Dept: CARDIAC REHAB | Age: 64
Discharge: HOME | End: 2019-04-01
Payer: MEDICARE

## 2019-04-01 PROCEDURE — 93798 PHYS/QHP OP CAR RHAB W/ECG: CPT

## 2019-04-03 ENCOUNTER — HOSPITAL ENCOUNTER (OUTPATIENT)
Dept: CARDIAC REHAB | Age: 64
Discharge: HOME | End: 2019-04-03
Payer: MEDICARE

## 2019-04-03 VITALS — BODY MASS INDEX: 22.14 KG/M2 | WEIGHT: 137.2 LBS

## 2019-04-03 PROCEDURE — 93798 PHYS/QHP OP CAR RHAB W/ECG: CPT

## 2019-04-05 ENCOUNTER — HOSPITAL ENCOUNTER (OUTPATIENT)
Dept: CARDIAC REHAB | Age: 64
Discharge: HOME | End: 2019-04-05
Payer: MEDICARE

## 2019-04-05 PROCEDURE — 93798 PHYS/QHP OP CAR RHAB W/ECG: CPT

## 2019-04-08 ENCOUNTER — HOSPITAL ENCOUNTER (OUTPATIENT)
Dept: CARDIAC REHAB | Age: 64
Discharge: HOME | End: 2019-04-08
Payer: MEDICARE

## 2019-04-08 VITALS — BODY MASS INDEX: 22.11 KG/M2 | WEIGHT: 137 LBS

## 2019-04-08 PROCEDURE — 93798 PHYS/QHP OP CAR RHAB W/ECG: CPT

## 2019-04-10 ENCOUNTER — HOSPITAL ENCOUNTER (OUTPATIENT)
Dept: CARDIAC REHAB | Age: 64
End: 2019-04-10
Payer: MEDICARE

## 2019-04-12 ENCOUNTER — HOSPITAL ENCOUNTER (OUTPATIENT)
Dept: CARDIAC REHAB | Age: 64
End: 2019-04-12
Payer: MEDICARE

## 2019-04-15 ENCOUNTER — HOSPITAL ENCOUNTER (OUTPATIENT)
Dept: CARDIAC REHAB | Age: 64
End: 2019-04-15
Payer: MEDICARE

## 2019-04-17 ENCOUNTER — HOSPITAL ENCOUNTER (OUTPATIENT)
Dept: CARDIAC REHAB | Age: 64
Discharge: HOME | End: 2019-04-17
Payer: MEDICARE

## 2019-04-17 VITALS — BODY MASS INDEX: 21.95 KG/M2 | WEIGHT: 136 LBS

## 2019-04-17 PROCEDURE — 93798 PHYS/QHP OP CAR RHAB W/ECG: CPT

## 2019-04-22 ENCOUNTER — HOSPITAL ENCOUNTER (OUTPATIENT)
Dept: CARDIAC REHAB | Age: 64
End: 2019-04-22
Payer: MEDICARE

## 2019-04-24 ENCOUNTER — HOSPITAL ENCOUNTER (OUTPATIENT)
Dept: CARDIAC REHAB | Age: 64
Discharge: HOME | End: 2019-04-24
Payer: MEDICARE

## 2019-04-24 ENCOUNTER — HOSPITAL ENCOUNTER (OUTPATIENT)
Dept: LAB | Age: 64
Discharge: HOME | End: 2019-04-24
Attending: INTERNAL MEDICINE
Payer: MEDICARE

## 2019-04-24 VITALS — WEIGHT: 135 LBS | BODY MASS INDEX: 21.79 KG/M2

## 2019-04-24 DIAGNOSIS — E78.5 DYSLIPIDEMIA: ICD-10-CM

## 2019-04-24 LAB
ALBUMIN SERPL-MCNC: 3.6 G/DL (ref 3.2–4.6)
ALBUMIN/GLOB SERPL: 1 {RATIO}
ALP SERPL-CCNC: 55 U/L (ref 50–136)
ALT SERPL-CCNC: 23 U/L (ref 12–65)
ANION GAP SERPL CALC-SCNC: 4 MMOL/L
AST SERPL-CCNC: 16 U/L (ref 15–37)
BILIRUB SERPL-MCNC: 0.2 MG/DL (ref 0.2–1.1)
BUN SERPL-MCNC: 20 MG/DL (ref 8–23)
CALCIUM SERPL-MCNC: 9.3 MG/DL (ref 8.3–10.4)
CHLORIDE SERPL-SCNC: 109 MMOL/L (ref 98–107)
CHOLEST SERPL-MCNC: 153 MG/DL
CO2 SERPL-SCNC: 29 MMOL/L (ref 21–32)
CREAT SERPL-MCNC: 0.7 MG/DL (ref 0.6–1)
GLOBULIN SER CALC-MCNC: 3.7 G/DL
GLUCOSE SERPL-MCNC: 98 MG/DL (ref 65–100)
HDLC SERPL-MCNC: 50 MG/DL (ref 40–60)
HDLC SERPL: 3.1 {RATIO}
LDLC SERPL CALC-MCNC: 81.4 MG/DL
LIPID PROFILE,FLP: NORMAL
POTASSIUM SERPL-SCNC: 4.1 MMOL/L (ref 3.5–5.1)
PROT SERPL-MCNC: 7.3 G/DL (ref 6.3–8.2)
SODIUM SERPL-SCNC: 142 MMOL/L (ref 136–145)
TRIGL SERPL-MCNC: 108 MG/DL (ref 35–150)
VLDLC SERPL CALC-MCNC: 21.6 MG/DL (ref 6–23)

## 2019-04-24 PROCEDURE — 80053 COMPREHEN METABOLIC PANEL: CPT

## 2019-04-24 PROCEDURE — 93798 PHYS/QHP OP CAR RHAB W/ECG: CPT

## 2019-04-24 PROCEDURE — 36415 COLL VENOUS BLD VENIPUNCTURE: CPT

## 2019-04-24 PROCEDURE — 80061 LIPID PANEL: CPT

## 2019-04-26 ENCOUNTER — HOSPITAL ENCOUNTER (OUTPATIENT)
Dept: CARDIAC REHAB | Age: 64
Discharge: HOME | End: 2019-04-26
Payer: MEDICARE

## 2019-04-26 PROCEDURE — 93798 PHYS/QHP OP CAR RHAB W/ECG: CPT

## 2019-04-29 ENCOUNTER — HOSPITAL ENCOUNTER (OUTPATIENT)
Dept: CARDIAC REHAB | Age: 64
Discharge: HOME | End: 2019-04-29
Payer: MEDICARE

## 2019-04-29 PROCEDURE — 93798 PHYS/QHP OP CAR RHAB W/ECG: CPT

## 2019-05-01 ENCOUNTER — HOSPITAL ENCOUNTER (OUTPATIENT)
Dept: CARDIAC REHAB | Age: 64
Discharge: HOME | End: 2019-05-01
Payer: MEDICARE

## 2019-05-01 VITALS — WEIGHT: 136.2 LBS | BODY MASS INDEX: 21.98 KG/M2

## 2019-05-01 PROCEDURE — 93798 PHYS/QHP OP CAR RHAB W/ECG: CPT

## 2019-05-06 ENCOUNTER — HOSPITAL ENCOUNTER (OUTPATIENT)
Dept: PHYSICAL THERAPY | Age: 64
Discharge: HOME | End: 2019-05-06
Payer: MEDICARE

## 2019-05-06 PROCEDURE — 97161 PT EVAL LOW COMPLEX 20 MIN: CPT

## 2019-05-06 PROCEDURE — 97110 THERAPEUTIC EXERCISES: CPT

## 2019-05-06 NOTE — THERAPY EVALUATION
Alexander Alfredo  : 1955  [place]  Secondary: Jose Alberto Lizarraga at [address]  [place] [address], [address], [place] [address]  [phone]   [phone]          OUTPATIENT PHYSICAL THERAPY:Initial Assessment 2019   ICD-10: Treatment Diagnosis: Pain in left shoulder (M25.512); Other sprain of left shoulder joint, sequela (U66.334G)  Precautions/Allergies:   Codeine; Cenestin [synthetic conj estrogens a]; Doxycycline; Levaquin [levofloxacin]; Pcn [penicillins]; and Sulfa dyne   MD Orders: evaluate and treat MEDICAL/REFERRING DIAGNOSIS:  Pain in left shoulder [M25.512]   DATE OF ONSET: 2018  REFERRING PHYSICIAN: Ila Rust MD  RETURN PHYSICIAN APPOINTMENT: 6-10-19     INITIAL ASSESSMENT:  Ms. Yael Lou presents in therapy today with signs and symptoms indicating L shoulder rotator cuff pathology and possible adhesive capsulitis. She will benefit from skilled PT in order to achieve optimum strength and motion in the affected UE for full functional mobility. PROBLEM LIST (Impacting functional limitations):  1. Decreased Strength  2. Decreased ADL/Functional Activities  3. Increased Pain  4. Decreased Activity Tolerance  5. Decreased Flexibility/Joint Mobility  6. Decreased Knowledge of Precautions  7. Decreased Prentiss with Home Exercise Program INTERVENTIONS PLANNED: (Treatment may consist of any combination of the following)  1. Cold  2. Heat  3. Home Exercise Program (HEP)  4. Manual Therapy  5. Range of Motion (ROM)  6. Therapeutic Activites  7. Therapeutic Exercise/Strengthening   TREATMENT PLAN:  Effective Dates: 2019 TO 2019 (60 days). Frequency/Duration: 2 times a week for 60 Day(s)  GOALS: (Goals have been discussed and agreed upon with patient.)    SHORT-TERM FUNCTIONAL GOALS: Time Frame: 3 weeks  1.   Patient will be compliant with HEP focused on upper extremity strengthening and ROM.   2.  Patient will rate L shoulder pain no greater than 6/10 for improved tolerance to daily and work duties. DISCHARGE GOALS: Time Frame: 6 weeks  1. Patient will be independent with comprehensive HEP focused on continued performance of upper extremity strengthening and ROM activities. 2.  Patient will rate L shoulder pain no greater than 3/10 and which does not significantly interfere with daily or work duties for return to previous level of function. 3.  Patient will demonstrate near symmetrical bilateral UE AROM for optimum functional mobility with daily and work duties. 4.  Patient will demonstrate near symmetrical bilateral UE strength grades in the above tested planes for optimum performance and safety with daily and work duties. Rehabilitation Potential For Stated Goals: Good  Regarding Sandee Maradiaga Helms's therapy, I certify that the treatment plan above will be carried out by a therapist or under their direction. Thank you for this referral,  Eliel Dickerson, PT, DPT     Referring Physician Signature: Brenton Wilburn MD              Date                    The information in this section was collected on 5-9-19 (except where otherwise noted). HISTORY:   History of Present Injury/Illness (Reason for Referral):  Patient reports gradual onset of L shoulder pain and stiffness, beginning in December 2018. Her symptoms have progressively worsened since onset. She had a CT scan, which she states did not show any tears, so possible frozen shoulder.    Past Medical History/Comorbidities:   Ms. Sandi Wing  has a past medical history of Anxiety, BCC (basal cell carcinoma of skin), CAD (coronary artery disease), Chronic insomnia, Dyslipidemia, Fibromyalgia, GERD (gastroesophageal reflux disease), IBS (irritable bowel syndrome), Ischemic colitis ([facility] [address] (2014), Osteopenia, Overactive bladder, S/P CABG x 3 (12/2018), S/P implantation of automatic cardioverter/defibrillator (AICD) (12/2018), SCCA (squamous cell carcinoma) of skin, and Systolic congestive heart failure ([facility] [address] Ms. Renetta Oliva  has a past surgical history that includes implant breast silicone/eq (Bilateral, ); hx colonoscopy (); hx kym and bso (); hx  section (); hx breast biopsy (Left, ); hx breast augmentation (); hx heart catheterization (2018); hx coronary artery bypass graft (2018); hx tonsillectomy; hx adenoidectomy; and hx pacemaker placement (2018). Social History/Living Environment:     Independent   Prior Level of Function/Work/Activity:  Works as  from home  Dominant Side:         RIGHT  Personal Factors:          Sex:  female        Age:  70 y.o.  test   Ambulatory/Rehab Services H2 Model Falls Risk Assessment    Risk Factors:       No Risk Factors Identified Ability to Rise from Chair:       (0)  Ability to rise in a single movement    Falls Prevention Plan:       No modifications necessary   Total: (5 or greater = High Risk): 0     [facility] [address] [place] [address] Patent #[id number]. Federal Law prohibits the replication, distribution or use without written permission from [facility]     Current Medications:       Current Outpatient Medications:     pravastatin (PRAVACHOL) 80 mg tablet, TAKE 1 TABLET BY MOUTH EVERY DAY AT NIGHT, Disp: , Rfl: 11    pantoprazole (PROTONIX) 40 mg tablet, TAKE 1 TABLET BY MOUTH DAILY, Disp: 90 Tab, Rfl: 1    carvedilol (COREG) 6.25 mg tablet, Take 6.25 mg by mouth two (2) times daily (with meals). , Disp: , Rfl:     nitroglycerin (NITROSTAT) 0.4 mg SL tablet, 1 Tab by SubLINGual route every five (5) minutes as needed for Chest Pain., Disp: 25 Tab, Rfl: 11    aspirin delayed-release 81 mg tablet, Take 81 mg by mouth nightly., Disp: , Rfl:    Date Last Reviewed:  2019     Number of Personal Factors/Comorbidities that affect the Plan of Care: 0: LOW COMPLEXITY   EXAMINATION:   Observation/Orthostatic Postural Assessment:          Patient appears in healthy condition, fair posture with bilaterally rounded shoulders  Palpation:          Mild tenderness at anterior aspect of R shoulder and rotator cuff insertion   ROM:          R shoulder AROM: flexion 120, abduction 90, ER 35   Strength:          3-/5 in all planes since ROM is not full    Body Structures Involved:  1. Bones  2. Joints  3. Muscles Body Functions Affected:  1. Sensory/Pain  2. Movement Related Activities and Participation Affected:  1. General Tasks and Demands  2. Mobility  3. Self Care  4. Domestic Life  5. Interpersonal Interactions and Relationships  6. Community, Social and [place]   Number of elements (examined above) that affect the Plan of Care: 3: MODERATE COMPLEXITY   CLINICAL PRESENTATION:   Presentation: Stable and uncomplicated: LOW COMPLEXITY   CLINICAL DECISION MAKING:   Outcome Measure: Tool Used: Disabilities of the Arm, Shoulder and Hand (DASH) Questionnaire - Quick Version  Score:  Initial: 34/55  Most Recent: X/55 (Date: -- )   Interpretation of Score: The DASH is designed to measure the activities of daily living in person's with upper extremity dysfunction or pain. Each section is scored on a 1-5 scale, 5 representing the greatest disability. The scores of each section are added together for a total score of 55. Medical Necessity:   · Patient is expected to demonstrate progress in strength and range of motion to improve safety during functional mobility. Reason for Services/Other Comments:  · Patient continues to require skilled intervention due to not reaching long term goals.    Use of outcome tool(s) and clinical judgement create a POC that gives a: Questionable prediction of patient's progress: MODERATE COMPLEXITY

## 2019-05-08 ENCOUNTER — APPOINTMENT (OUTPATIENT)
Dept: PHYSICAL THERAPY | Age: 64
End: 2019-05-08
Payer: MEDICARE

## 2019-05-09 NOTE — PROGRESS NOTES
Candis Chu  : 1955  [place]  Secondary: [facility] Alber[facility]rraga [facility], [address], [place] [address]  [phone]   [phone]        OUTPATIENT PHYSICAL THERAPY: Daily Treatment Note 2019  Pre-treatment Symptoms/Complaints:  Patient reports stiffness at the R shoulder, as well as pain, that makes functional mobility very difficult. Rates pain as 5/10 currently, 8/10 at worst. 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 Pain: Initial: Pain Intensity 1: 5  Pain Location 1: Shoulder  Pain Orientation 1: Left  Post Session:  5/10   Medications Last Reviewed:  2019    Updated Objective Findings:  See evaluation note from today   TREATMENT:     THERAPEUTIC EXERCISE: (10 minutes):  Exercises per grid below to improve mobility and strength. Required minimal verbal cues to promote proper body alignment, promote proper body posture and promote proper body mechanics. Progressed complexity of movement as indicated. Date:  19 Date:   Date:     Activity/Exercise Parameters Parameters Parameters   UBE Next visit     Table slide flexion  x10  10 second holds     Wand MIKO Next visit                                  Treatment/Session Summary:    · Response to Treatment:  Patient verbalized understanding of plan of care, focused on primarily improving her ROM. · Communication/Consultation:  HEP of AAROM and PROM shoulder exercises  · Equipment provided today:  None today  · Recommendations/Intent for next treatment session: Next visit will focus on improving her R shoulder motion and scapular strength.   Treatment Plan of Care Effective Dates:  2019 TO 2019   Total Treatment Billable Duration:  40 minutes - 30 minute initial evaluation and 10 minute therapeutic exercise  PT Patient Time In/Time Out  Time In: 0730  Time Out: 0815  Karen Hoffman Sanket Lara, DPT    Future Appointments   Date Time Provider Ayla Wills   5/10/2019  8:15 AM Mercy Hubbard, PT [facility]   5/13/2019  8:15 AM Ekaterina Ko PT, DPT [facility]   5/14/2019  8:15 AM Glen Gonzalez PT, DPT [facility]   5/20/2019  7:30 AM Mercy Hubbard PT [facility]   5/21/2019  7:30 AM Mercy Hubbard PT [facility]   5/28/2019  7:30 AM Mercy Hubbard PT [facility]   5/29/2019  7:30 AM Glen Gonzalez PT, DPT [facility]   6/3/2019  7:30 AM Glen Gonzalez PT, DPT [facility]   6/4/2019  7:30 AM Glen Gonzalez, PT, DPT [facility]   6/10/2019  7:30 AM Glen Gonzalez PT, DPT [facility]   6/11/2019  7:30 AM Glen Gonzalez PT, DPT [facility]   7/8/2019  9:50 AM GVLLE REMOTE AICD 62 [facility]   7/15/2019  8:30 AM Aura Valentin DO [facility]   10/21/2019  8:15 AM Edilia Gonzales MD [facility]   10/30/2019  8:30 AM GVLLE ECHO 26 [facility] U[facility]   11/5/2019  9:00 AM Colton Manzano DO [facility]

## 2019-05-10 ENCOUNTER — HOSPITAL ENCOUNTER (OUTPATIENT)
Dept: PHYSICAL THERAPY | Age: 64
Discharge: HOME | End: 2019-05-10
Payer: MEDICARE

## 2019-05-10 PROCEDURE — 97140 MANUAL THERAPY 1/> REGIONS: CPT

## 2019-05-10 PROCEDURE — 97110 THERAPEUTIC EXERCISES: CPT

## 2019-05-10 NOTE — PROGRESS NOTES
Andry Solis  : 1955  [place]  Secondary: [facility], [address], [place] [address]  [phone]   [phone]        OUTPATIENT PHYSICAL THERAPY: Daily Treatment Note 5/10/2019  Pre-treatment Symptoms/Complaints:  Patient reports stiffness at the R shoulder and pain when she moves it. Pain: Initial: Pain Intensity 1: 5  Post Session:  4/10   Medications Last Reviewed:  5/10/2019    Updated Objective Findings:  None Today   TREATMENT:     THERAPEUTIC EXERCISE: (30 minutes):  Exercises per grid below to improve mobility and strength. Required minimal verbal cues to promote proper body alignment, promote proper body posture and promote proper body mechanics. Progressed complexity of movement as indicated. MANUAL THERAPY: (10 minutes): Joint mobilization and Soft tissue mobilization was utilized and necessary because of the patient's restricted joint motion, painful spasm, loss of articular motion and restricted motion of soft tissue. Date:  19 Date:  5/10/19 Date:     Activity/Exercise Parameters Parameters Parameters   UBE Next visit 3/3 2.5     Table slide flexion  x10  10 second holds     Wand AAROM Next visit  20x each direction (flexion, extension, abduction, ER, IR)     Pulley  Flexion, abduction, IR                    Treatment/Session Summary:    · Response to Treatment:  Pt had slight increase in motion following therex and manual. She was very tender and tight in her L biceps. · Communication/Consultation:  HEP of AAROM and PROM shoulder exercises  · Equipment provided today:  None today  · Recommendations/Intent for next treatment session: Next visit will focus on improving her R shoulder motion and scapular strength.   Treatment Plan of Care Effective Dates:  2019 TO 2019   Total Treatment Billable Duration:  30 therex, 10 manual  PT Patient Time In/Time Out  Time In: 0820  Time Out: 0900  Naya Gupta, PT    Future Appointments   Date Time Provider Ayla Elma   5/13/2019  8:15 AM Cloyce Messi, PT, DPT [facility]   5/14/2019  8:15 AM Cloyce Messi, PT, DPT [facility]   5/20/2019  7:30 AM Rue Peace, PT [facility]   5/21/2019  7:30 AM Rue Peace, PT [facility]   5/28/2019  7:30 AM Rue Peace, PT [facility]   5/29/2019  7:30 AM Cloyce Messi, PT, DPT [facility]   6/3/2019  7:30 AM Cloyce Messi, PT, DPT [facility]   6/4/2019  7:30 AM Cloyce Messi, PT, DPT [facility]   6/10/2019  7:30 AM Cloyce Messi, PT, DPT [facility]   6/11/2019  7:30 AM Cloyce Messi, PT, DPT [facility]   7/8/2019  9:50 AM GVLLE REMOTE AICD 62 [facility]   7/15/2019  8:30 AM Reinier Palacios DO [facility]   10/21/2019  8:15 AM Mecca Muller MD [facility]   10/30/2019  8:30 AM GVLLE ECHO 26 [facility] U[facility]   11/5/2019  9:00 AM Kathy Green DO [facility]

## 2019-05-13 ENCOUNTER — HOSPITAL ENCOUNTER (OUTPATIENT)
Dept: PHYSICAL THERAPY | Age: 64
Discharge: HOME | End: 2019-05-13
Payer: MEDICARE

## 2019-05-13 PROCEDURE — 97140 MANUAL THERAPY 1/> REGIONS: CPT

## 2019-05-13 PROCEDURE — 97110 THERAPEUTIC EXERCISES: CPT

## 2019-05-13 NOTE — PROGRESS NOTES
Vianca Camacho  : 1955  [place]  Secondary: Jose Alberto Lizarraga at [facility], [address], [place] [address]  [phone]   [phone]        OUTPATIENT PHYSICAL THERAPY: Daily Treatment Note 2019  Pre-treatment Symptoms/Complaints:  Patient reports she continues to feel stiffness and pain at the affected shoulder, but progressing slowly. Pain: Initial: Pain Intensity 1: 5  Pain Location 1: Shoulder  Pain Orientation 1: Left  Post Session:  4/10   Medications Last Reviewed:  2019    Updated Objective Findings:  None Today   TREATMENT:     THERAPEUTIC EXERCISE: (30 minutes):  Exercises per grid below to improve mobility and strength. Required minimal verbal cues to promote proper body alignment, promote proper body posture and promote proper body mechanics. Progressed complexity of movement as indicated. MANUAL THERAPY: (10 minutes): Joint mobilization and Soft tissue mobilization was utilized and necessary because of the patient's restricted joint motion, painful spasm, loss of articular motion and restricted motion of soft tissue. Patient received passive physiological mobilizations in abduction and ER, both to tolerance, 20 second holds at available end range, patient supine, shoulder in LPP     Date:  19 Date:  5/10/19 Date:  19   Activity/Exercise Parameters Parameters Parameters   UBE Next visit 3/3 2.5  6 minutes  Level 1   Table slide flexion  x10  10 second holds  x10  On swiss ball on mat table  To tolerance    Wand AAROM Next visit  20x each direction (flexion, extension, abduction, ER, IR)  20x each direction (flexion, extension, abduction, ER, IR)    Pulley  Flexion, abduction, IR FE to tolerance  x10                    Treatment/Session Summary:    · Response to Treatment:  Pt does well, but does not significant motion limitations due to pain. · Communication/Consultation:  Continue HEP  · Equipment provided today:  None today  · Recommendations/Intent for next treatment session: Next visit will focus on improving her R shoulder motion and scapular strength.   Treatment Plan of Care Effective Dates:  5/6/2019 TO 7/8/2019   Total Treatment Billable Duration: 40 minutes -  30 therex, 10 manual  PT Patient Time In/Time Out  Time In: 0815  Time Out: 0900  Rock Garcia, PT, DPT    Future Appointments   Date Time Provider Ayla Wills   5/13/2019  8:15 AM Darlys Sniff, PT, DPT [facility]   5/14/2019  8:15 AM Darlys Sniff, PT, DPT [facility]   5/20/2019  7:30 AM Tang Brood, PT [facility]   5/21/2019  7:30 AM Tang Brood, PT [facility]   5/28/2019  7:30 AM Tang Brood, PT [facility]   5/29/2019  7:30 AM Darlys Sniff, PT, DPT [facility]   6/3/2019  7:30 AM Darlys Sniff, PT, DPT [facility]   6/4/2019  7:30 AM Darlys Sniff, PT, DPT [facility]   6/10/2019  7:30 AM Darlys Sniff, PT, DPT [facility]   6/11/2019  7:30 AM Darlys Sniff, PT, DPT [facility]   7/8/2019  9:50 AM GVLLE REMOTE AICD 62 [facility] UCD   7/15/2019  8:30 AM Mary Ellen Hayes DO [facility]   10/21/2019  8:15 AM Huber Burrell MD [facility]   10/30/2019  8:30 AM GVLLJOSE ECHO 26 [facility] UCD   11/5/2019  9:00 AM [facility]

## 2019-05-14 ENCOUNTER — HOSPITAL ENCOUNTER (OUTPATIENT)
Dept: PHYSICAL THERAPY | Age: 64
Discharge: HOME | End: 2019-05-14
Payer: MEDICARE

## 2019-05-14 ENCOUNTER — APPOINTMENT (OUTPATIENT)
Dept: PHYSICAL THERAPY | Age: 64
End: 2019-05-14

## 2019-05-14 PROCEDURE — 97140 MANUAL THERAPY 1/> REGIONS: CPT

## 2019-05-14 PROCEDURE — 97110 THERAPEUTIC EXERCISES: CPT

## 2019-05-14 NOTE — PROGRESS NOTES
Rachel Leon  : 1955  [place]  Secondary: Jose Alberto Lizarraga at [facility], [address], [place] [address]  [phone]   [phone]        OUTPATIENT PHYSICAL THERAPY: Daily Treatment Note 2019     ICD-10: Treatment Diagnosis: Pain in left shoulder (M25.512); Other sprain of left shoulder joint, sequela (K43.153Z)  Precautions/Allergies:   Codeine; Cenestin [synthetic conj estrogens a]; Doxycycline; Levaquin [levofloxacin]; Pcn [penicillins]; and Sulfa dyne   MD Orders: evaluate and treat MEDICAL/REFERRING DIAGNOSIS:  Pain in left shoulder [M25.512]   DATE OF ONSET: 2018  REFERRING PHYSICIAN: Mary Ann Montes MD  RETURN PHYSICIAN APPOINTMENT: 6-10-19       Pre-treatment Symptoms/Complaints:  Patient reports she continues to feel stiffness and pain at the affected shoulder, but progressing slowly. Pain: Initial: Pain Intensity 1: 5  Pain Location 1: Shoulder  Pain Orientation 1: Left  Post Session:  4/10   Medications Last Reviewed:  2019    Updated Objective Findings:  None Today   TREATMENT:     THERAPEUTIC EXERCISE: (30 minutes):  Exercises per grid below to improve mobility and strength. Required minimal verbal cues to promote proper body alignment, promote proper body posture and promote proper body mechanics. Progressed complexity of movement as indicated. MANUAL THERAPY: (10 minutes): Joint mobilization and Soft tissue mobilization was utilized and necessary because of the patient's restricted joint motion, painful spasm, loss of articular motion and restricted motion of soft tissue.    Patient received passive physiological mobilizations in abduction and ER, both to tolerance, 20 second holds at available end range, patient supine, shoulder in LPP     Date:  19 Date:  5/10/19 Date:  19 Date  19   Activity/Exercise Parameters Parameters Parameters    UBE Next visit 3/3 2.5  6 minutes  Level 1 8 minutes  Level 1   Table slide flexion  x10  10 second holds  x10  On swiss ball on mat table  To tolerance  x10  On swiss ball on mat table  To tolerance    Wand AAROM Next visit  20x each direction (flexion, extension, abduction, ER, IR)  20x each direction (flexion, extension, abduction, ER, IR)  Standing abduction to tolerance x10    Pulley  Flexion, abduction, IR FE to tolerance  x10  FE to tolerance  x10                      Treatment/Session Summary:    · Response to Treatment:  Pt does well, but does not significant motion limitations due to pain. · Communication/Consultation:  Continue HEP  · Equipment provided today:  None today  · Recommendations/Intent for next treatment session: Next visit will focus on improving her R shoulder motion and scapular strength.   Treatment Plan of Care Effective Dates:  5/6/2019 TO 7/8/2019   Total Treatment Billable Duration: 40 minutes -  30 therex, 10 manual  PT Patient Time In/Time Out  Time In: 0815  Time Out: 0900  Pam Servin PT, DPT    Future Appointments   Date Time Provider Ayla Wills   5/14/2019  8:15 AM Medardo Ko, PT, DPT [facility]   5/20/2019  7:30 AM Telma Lock, PT [facility]   5/21/2019  7:30 AM Telma Lock, PT [facility]   5/28/2019  7:30 AM Telma Lock, PT [facility]   5/29/2019  7:30 AM Medardo Ko PT, DPT [facility]   6/3/2019  7:30 AM Medardo Ko PT, DPT [facility]   6/4/2019  7:30 AM Medardo Ko, PT, DPT [facility]   6/10/2019  7:30 AM Medardo Ko PT, DPT [facility]   6/11/2019  7:30 AM Medardo Ko PT, DPT [facility]   7/8/2019  9:50 AM LAURENCE ARROYO AICD 62 [facility] UCDG UCD   7/15/2019  8:30 AM Rupali Gayle DO [facility]   10/21/2019  8:15 AM Courtney Ruiz MD [facility]   10/30/2019  8:30 AM GVLLE ECHO 26 [facility]   11/5/2019  9:00 AM Tarik Gaines DO [facility]

## 2019-05-20 ENCOUNTER — HOSPITAL ENCOUNTER (OUTPATIENT)
Dept: PHYSICAL THERAPY | Age: 64
Discharge: HOME | End: 2019-05-20
Payer: MEDICARE

## 2019-05-20 PROCEDURE — 97140 MANUAL THERAPY 1/> REGIONS: CPT

## 2019-05-20 PROCEDURE — 97110 THERAPEUTIC EXERCISES: CPT

## 2019-05-20 NOTE — PROGRESS NOTES
Tonja Snyder  : 1955  [place]  Secondary: Jose Alberto Lizarraga at [address]  [place] [address], [address], [place]  [phone]   [phone]        OUTPATIENT PHYSICAL THERAPY: Daily Treatment Note 2019     ICD-10: Treatment Diagnosis: Pain in left shoulder (M25.512); Other sprain of left shoulder joint, sequela (T22.511T)  Precautions/Allergies:   Codeine; Cenestin [synthetic conj estrogens a]; Doxycycline; Levaquin [levofloxacin]; Pcn [penicillins]; and Sulfa dyne   MD Orders: evaluate and treat MEDICAL/REFERRING DIAGNOSIS:  Pain in left shoulder [M25.512]   DATE OF ONSET: 2018  REFERRING PHYSICIAN: Jenniffer Alonzo MD  RETURN PHYSICIAN APPOINTMENT: 6-10-19       Pre-treatment Symptoms/Complaints:  Patient reports she feels as though she can move her arm more. Pain: Initial: Pain Intensity 1: 5  Post Session:  4/10   Medications Last Reviewed:  2019    Updated Objective Findings:  None Today   TREATMENT:     THERAPEUTIC EXERCISE: (30 minutes):  Exercises per grid below to improve mobility and strength. Required minimal verbal cues to promote proper body alignment, promote proper body posture and promote proper body mechanics. Progressed complexity of movement as indicated. MANUAL THERAPY: (10 minutes): Joint mobilization and Soft tissue mobilization was utilized and necessary because of the patient's restricted joint motion, painful spasm, loss of articular motion and restricted motion of soft tissue.    Patient received passive physiological mobilizations in abduction and ER, both to tolerance, 20 second holds at available end range, patient supine, shoulder in LPP  STM to L biceps, L upper trap   Date:  19 Date:  5/10/19 Date:  19 Date  19 Date:  19   Activity/Exercise Parameters Parameters Parameters  Parameters   UBE Next visit 3/3 2.5  6 minutes  Level 1 8 minutes  Level 1 4/4 1.0    Table slide flexion  x10  10 second holds  x10  On swiss ball on mat table  To tolerance  x10  On swiss ball on mat table  To tolerance  x10 on swiss ball on mat table to tolerance   Wand AAROM Next visit  20x each direction (flexion, extension, abduction, ER, IR)  20x each direction (flexion, extension, abduction, ER, IR)  Standing abduction to tolerance x10  x10 abduction in stand   Pulley  Flexion, abduction, IR FE to tolerance  x10  FE to tolerance  x10  FE, abduction, IR to tolerance x10    Rows     RTB 2x10    Ms     RTB 2x10      MODALITIES: (5 mins) moist heat to L upper trap    Treatment/Session Summary:    · Response to Treatment:  Pt progressed w/ therex this session w/o issue. She had noticeable tightness/tenderness in L upper trap. · Communication/Consultation:  Continue HEP  · Equipment provided today:  None today  · Recommendations/Intent for next treatment session: Next visit will focus on improving her R shoulder motion and scapular strength.   Treatment Plan of Care Effective Dates:  5/6/2019 TO 7/8/2019   Total Treatment Billable Duration: 40 minutes -  30 therex, 10 manual  PT Patient Time In/Time Out  Time In: 0730  Time Out: 0815  Shania Lantigua PT    Future Appointments   Date Time Provider Ayla Wills   5/21/2019  7:30 AM Juancho Kange, PT [facility]   5/28/2019  7:30 AM Juancho Kange, PT [facility]   5/29/2019  7:30 AM Ardine Collar, PT, DPT [facility]   6/3/2019  7:30 AM Ardine Collar, PT, DPT [facility]   6/4/2019  7:30 AM Ardine Collar, PT, DPT [facility]   6/10/2019  7:30 AM Ardine Collar, PT, DPT [facility]   6/11/2019  7:30 AM Ardine Collar, PT, DPT [facility]   7/8/2019  9:50 AM LAURENCE ARROYO AICD 62 [facility]   7/15/2019  8:30 AM Brenda Cooper DO [facility]   10/21/2019  8:15 AM Kenisha Carmichael MD [facility]   10/30/2019  8:30 AM LAURENCE ECHO 26 [facility]   11/5/2019 9:00 AM Nira Opitz, DO [facility]

## 2019-05-21 ENCOUNTER — HOSPITAL ENCOUNTER (OUTPATIENT)
Dept: PHYSICAL THERAPY | Age: 64
Discharge: HOME | End: 2019-05-21
Payer: MEDICARE

## 2019-05-21 PROCEDURE — 97110 THERAPEUTIC EXERCISES: CPT

## 2019-05-21 PROCEDURE — 97140 MANUAL THERAPY 1/> REGIONS: CPT

## 2019-05-21 NOTE — PROGRESS NOTES
Israel Geneseo  : 1955  [place]  Secondary: Jose Alberto Lizarraga at [facility]  [address], [address], [place] [address]  [phone]   [phone]        OUTPATIENT PHYSICAL THERAPY: Daily Treatment Note 2019     ICD-10: Treatment Diagnosis: Pain in left shoulder (M25.512); Other sprain of left shoulder joint, sequela (Y86.081T)  Precautions/Allergies:   Codeine; Cenestin [synthetic conj estrogens a]; Doxycycline; Levaquin [levofloxacin]; Pcn [penicillins]; and Sulfa dyne   MD Orders: evaluate and treat MEDICAL/REFERRING DIAGNOSIS:  Pain in left shoulder [M25.512]   DATE OF ONSET: 2018  REFERRING PHYSICIAN: Sarath Barajas MD  RETURN PHYSICIAN APPOINTMENT: 6-10-19       Pre-treatment Symptoms/Complaints:  Patient reports she feels a lot better. Liked the [facility] and thinks it helped. Pain: Initial: Pain Intensity 1: 3  Post Session:  4/10   Medications Last Reviewed:  2019    Updated Objective Findings:  None Today   TREATMENT:     THERAPEUTIC EXERCISE: (30 minutes):  Exercises per grid below to improve mobility and strength. Required minimal verbal cues to promote proper body alignment, promote proper body posture and promote proper body mechanics. Progressed complexity of movement as indicated. MANUAL THERAPY: (10 minutes): Joint mobilization and Soft tissue mobilization was utilized and necessary because of the patient's restricted joint motion, painful spasm, loss of articular motion and restricted motion of soft tissue.    Patient received passive physiological mobilizations in abduction and ER, both to tolerance, 20 second holds at available end range, patient supine, shoulder in LPP  STM to L biceps, L upper trap   Date:  19 Date:  5/10/19 Date:  19 Date  19 Date:  19 Date:  19   Activity/Exercise Parameters Parameters Parameters  Parameters Parameters   UBE Next visit 3/3 2.5  6 minutes  Level 1 8 minutes  Level 1 4/4 1.0  4/4 1.0    Table slide flexion  x10  10 second holds  x10  On swiss ball on mat table  To tolerance  x10  On swiss ball on mat table  To tolerance  x10 on swiss ball on mat table to tolerance x10 swiss ball on mat table to tolerance   Wand AAROM Next visit  20x each direction (flexion, extension, abduction, ER, IR)  20x each direction (flexion, extension, abduction, ER, IR)  Standing abduction to tolerance x10  x10 abduction in stand x10 abduction in stand   Pulley  Flexion, abduction, IR FE to tolerance  x10  FE to tolerance  x10  FE, abduction, IR to tolerance x10  FE, abduction, IR to tolerance x10    Rows     RTB 2x10  RTB 2x10   Ms     RTB 2x10  RTB 2x10      MODALITIES: (5 mins) moist heat to L upper trap    Treatment/Session Summary:    · Response to Treatment:  Pt progressed w/ therex this session w/o issue. Decreased tenderness in arm but still very tender in L upper trap. · Communication/Consultation:  Continue HEP  · Equipment provided today:  None today  · Recommendations/Intent for next treatment session: Next visit will focus on improving her R shoulder motion and scapular strength.   Treatment Plan of Care Effective Dates:  5/6/2019 TO 7/8/2019   Total Treatment Billable Duration: 40 minutes -  30 therex, 10 manual  PT Patient Time In/Time Out  Time In: 0730  Time Out: 0815  Jared Hammer, PT    Future Appointments   Date Time Provider Ayla Wills   5/28/2019  7:30 AM Mercy Hubbard PT [facility]   5/29/2019  7:30 AM Glne Gonzalez PT, DPT [facility]   6/3/2019  7:30 AM Glen Gonzalez PT, DPT [facility]   6/4/2019  7:30 AM Glen Gonzalez PT, DPT [facility]   6/10/2019  7:30 AM Glen Gonzalez PT, DPT [facility]   6/11/2019  7:30 AM Glen Gonzalez PT, DPT [facility]   7/8/2019  9:50 AM GVLLE REMOTE AICD 62 [facility]   7/15/2019  8:30 AM Tara Givens, DO [facility] 10/21/2019  8:15 AM Lisa Tinajero MD [facility]   10/30/2019  8:30 [facility]   11/5/2019  9:00 AM Troy Dodge DO [facility]

## 2019-05-28 ENCOUNTER — HOSPITAL ENCOUNTER (OUTPATIENT)
Dept: PHYSICAL THERAPY | Age: 64
Discharge: HOME | End: 2019-05-28
Payer: MEDICARE

## 2019-05-28 PROCEDURE — 97110 THERAPEUTIC EXERCISES: CPT

## 2019-05-28 NOTE — PROGRESS NOTES
Vianca Camacho  : 1955  [place]  Secondary: [facility], [address], [place] [address]  [phone]   [phone]        OUTPATIENT PHYSICAL THERAPY: Daily Treatment Note 2019     ICD-10: Treatment Diagnosis: Pain in left shoulder (M25.512); Other sprain of left shoulder joint, sequela (G05.539X)  Precautions/Allergies:   Codeine; Cenestin [synthetic conj estrogens a]; Doxycycline; Levaquin [levofloxacin]; Pcn [penicillins]; and Sulfa dyne   MD Orders: evaluate and treat MEDICAL/REFERRING DIAGNOSIS:  Pain in left shoulder [M25.512]   DATE OF ONSET: 2018  REFERRING PHYSICIAN: Bryon Sheehan MD  RETURN PHYSICIAN APPOINTMENT: 6-10-19       Pre-treatment Symptoms/Complaints:  Patient reports she feels better. Was stuck on 80 which is why she was so late getting to her appointment. Pain: Initial: Pain Intensity 1: 0  Post Session:  0/10   Medications Last Reviewed:  2019    Updated Objective Findings:  None Today   TREATMENT:     THERAPEUTIC EXERCISE: (15 minutes):  Exercises per grid below to improve mobility and strength. Required minimal verbal cues to promote proper body alignment, promote proper body posture and promote proper body mechanics. Progressed complexity of movement as indicated. MANUAL THERAPY: (0 minutes): Joint mobilization and Soft tissue mobilization was utilized and necessary because of the patient's restricted joint motion, painful spasm, loss of articular motion and restricted motion of soft tissue.    Patient received passive physiological mobilizations in abduction and ER, both to tolerance, 20 second holds at available end range, patient supine, shoulder in LPP  STM to L biceps, L upper trap   Date:  19 Date:  5/10/19 Date:  19 Date  19 Date:  19 Date:  19 Date:  19   Activity/Exercise Parameters Parameters Parameters  Parameters Parameters Parameters   UBE Next visit 3/3 2.5  6 minutes  Level 1 8 minutes  Level 1 4/4 1.0  4/4 1.0  1/1 2.0    Table slide flexion  x10  10 second holds  x10  On swiss ball on mat table  To tolerance  x10  On swiss ball on mat table  To tolerance  x10 on swiss ball on mat table to tolerance x10 swiss ball on mat table to tolerance x10 swiss ball on mat table to tolerance   Wand AAROM Next visit  20x each direction (flexion, extension, abduction, ER, IR)  20x each direction (flexion, extension, abduction, ER, IR)  Standing abduction to tolerance x10  x10 abduction in stand x10 abduction in stand    Pulley  Flexion, abduction, IR FE to tolerance  x10  FE to tolerance  x10  FE, abduction, IR to tolerance x10  FE, abduction, IR to tolerance x10  FE, abduction, IR to tolerance x10    Rows     RTB 2x10  RTB 2x10 RTB  2x10   Ms     RTB 2x10  RTB 2x10  RTB 2x10    IR/ER       RTB 2x10 each   Scaption       1# 2x10 B     MODALITIES: (5 mins) moist heat to L upper trap    Treatment/Session Summary:    · Response to Treatment:  Pt progressed w/ exercises this session. We added scaption and IR/ER exercises today w/o issues. · Communication/Consultation:  Continue HEP  · Equipment provided today:  None today  · Recommendations/Intent for next treatment session: Next visit will focus on improving her R shoulder motion and scapular strength.   Treatment Plan of Care Effective Dates:  5/6/2019 TO 7/8/2019   Total Treatment Billable Duration: 15 mins therex  PT Patient Time In/Time Out  Time In: 3946  Time Out: 0815  Shawn Cota PT    Future Appointments   Date Time Provider Ayla Wills   5/29/2019  7:30 AM Lissy Ramey PT, DPT [facility]   6/3/2019  7:30 AM Lissy Ramey PT, DPT [facility]   6/4/2019  7:30 AM Lissy Ramey PT, DPT [facility]   6/10/2019  7:30 AM Lissy Ramey PT, DPT [facility]   6/11/2019  7:30 AM Lissy Ramey PT, DPT [facility]   7/8/2019  9:50 AM GVLLE REMOTE AICD 62 [facility]   7/15/2019  8:30 AM Bri Givens,  [facility]   10/21/2019  8:15 AM Berto Bryan MD [facility]   10/30/2019  8:30 AM GVLLE ECHO 26 [facility]   11/5/2019  9:00 AM Hany Strickland,  [facility] D

## 2019-05-29 ENCOUNTER — HOSPITAL ENCOUNTER (OUTPATIENT)
Dept: PHYSICAL THERAPY | Age: 64
Discharge: HOME | End: 2019-05-29
Payer: MEDICARE

## 2019-05-29 PROCEDURE — 97140 MANUAL THERAPY 1/> REGIONS: CPT

## 2019-05-29 PROCEDURE — 97110 THERAPEUTIC EXERCISES: CPT

## 2019-05-29 NOTE — PROGRESS NOTES
Clement Mccain  : 1955  [place]  Secondary: Jose Alberto Lizarraga at [facility], [address], [place] [address]  [phone]   [phone]        OUTPATIENT PHYSICAL THERAPY: Daily Treatment Note 2019     ICD-10: Treatment Diagnosis: Pain in left shoulder (M25.512); Other sprain of left shoulder joint, sequela (H91.238Y)  Precautions/Allergies:   Codeine; Cenestin [synthetic conj estrogens a]; Doxycycline; Levaquin [levofloxacin]; Pcn [penicillins]; and Sulfa dyne   MD Orders: evaluate and treat MEDICAL/REFERRING DIAGNOSIS:  Pain in left shoulder [M25.512]   DATE OF ONSET: 2018  REFERRING PHYSICIAN: Tabatha Braun MD  RETURN PHYSICIAN APPOINTMENT: 6-10-19       Pre-treatment Symptoms/Complaints:  Patient reports she is improving steadily. Pain: Initial: Pain Intensity 1: 2  Pain Location 1: Shoulder  Pain Orientation 1: Left  Post Session:  0/10   Medications Last Reviewed:  2019    Updated Objective Findings:  None Today   TREATMENT:     THERAPEUTIC EXERCISE: (30 minutes):  Exercises per grid below to improve mobility and strength. Required minimal verbal cues to promote proper body alignment, promote proper body posture and promote proper body mechanics. Progressed complexity of movement as indicated. MANUAL THERAPY: (10 minutes): Joint mobilization and Soft tissue mobilization was utilized and necessary because of the patient's restricted joint motion, painful spasm, loss of articular motion and restricted motion of soft tissue.    Patient received passive physiological mobilizations in abduction and ER, both to tolerance, 20 second holds at available end range, patient supine, shoulder in LPP     Date:  19 Date:  5/10/19 Date:  19 Date  19 Date:  19 Date:  19 Date:  19   Activity/Exercise Parameters Parameters Parameters  Parameters Parameters Parameters   UBE Next visit 3/3 2.5  6 minutes  Level 1 8 minutes  Level 1 4/4 1.0  4/4 1.0  1/1 2.0    Table slide flexion  x10  10 second holds  x10  On swiss ball on mat table  To tolerance  x10  On swiss ball on mat table  To tolerance  x10 on swiss ball on mat table to tolerance x10 swiss ball on mat table to tolerance x10 swiss ball on mat table to tolerance   Wand AAROM Next visit  20x each direction (flexion, extension, abduction, ER, IR)  20x each direction (flexion, extension, abduction, ER, IR)  Standing abduction to tolerance x10  x10 abduction in stand x10 abduction in stand    Pulley  Flexion, abduction, IR FE to tolerance  x10  FE to tolerance  x10  FE, abduction, IR to tolerance x10  FE, abduction, IR to tolerance x10  FE, abduction, IR to tolerance x10    Rows     RTB 2x10  RTB 2x10 RTB  2x10   Ms     RTB 2x10  RTB 2x10  RTB 2x10    IR/ER       RTB 2x10 each   Scaption       1# 2x10 B     MODALITIES: (5 mins) moist heat to L upper trap    Treatment/Session Summary:    · Response to Treatment:  Pt is showing steady progress in her motion, however passive abduction is still very limited, to roughly  degrees, with pain being most limiting factor. · Communication/Consultation:  Continue HEP  · Equipment provided today:  None today  · Recommendations/Intent for next treatment session: Next visit will focus on improving her R shoulder motion and scapular strength.   Treatment Plan of Care Effective Dates:  5/6/2019 TO 7/8/2019   Total Treatment Billable Duration: 40 minutes - 30 mins therapeutic exercise and 10 minutes manual therapy  PT Patient Time In/Time Out  Time In: 0730  Time Out: 0815  Eli Valderrama PT, DPT    Future Appointments   Date Time Provider Ayla Bunni   6/3/2019  7:30 AM Montserrat Newman PT, DPT [facility]   6/4/2019  7:30 AM Montserrat Newman PT, DPT [facility]   6/10/2019  7:30 AM Montserrat Newman PT, DPT [facility]   6/11/2019  7:30 AM Paige Israel Abdullahi, PT, DPT [facility]   7/8/2019  9:50 AM GVLLE REMOTE AICD 62 [facility]   7/15/2019  8:30 AM Rafi Givens DO [facility]   10/21/2019  8:15 AM Leobardo Perez MD [facility]   10/30/2019  8:30 AM GVLLE ECHO 26 [facility]   11/5/2019  9:00 AM Phu Avila DO [facility]

## 2019-06-03 ENCOUNTER — HOSPITAL ENCOUNTER (OUTPATIENT)
Dept: PHYSICAL THERAPY | Age: 64
Discharge: HOME | End: 2019-06-03
Payer: MEDICARE

## 2019-06-03 PROCEDURE — 97110 THERAPEUTIC EXERCISES: CPT

## 2019-06-03 PROCEDURE — 97140 MANUAL THERAPY 1/> REGIONS: CPT

## 2019-06-03 NOTE — PROGRESS NOTES
Clement Mccain  : 1955  [place]  Secondary: Jose Alberto Lizarraga at [facility]  [address], [address], [place] [address]  [phone]   [phone]        OUTPATIENT PHYSICAL THERAPY: Daily Treatment Note 6/3/2019     ICD-10: Treatment Diagnosis: Pain in left shoulder (M25.512); Other sprain of left shoulder joint, sequela (J97.641P)  Precautions/Allergies:   Codeine; Cenestin [synthetic conj estrogens a]; Doxycycline; Levaquin [levofloxacin]; Pcn [penicillins]; and Sulfa dyne   MD Orders: evaluate and treat MEDICAL/REFERRING DIAGNOSIS:  Pain in left shoulder [M25.512]   DATE OF ONSET: 2018  REFERRING PHYSICIAN: Tabatha Braun MD  RETURN PHYSICIAN APPOINTMENT: 6-10-19       Pre-treatment Symptoms/Complaints:  Patient reports that her pain has been increased over the past few days, but also notes that she is pushing her motion and activity more. Pain: Initial: Pain Intensity 1: 4  Pain Location 1: Shoulder  Pain Orientation 1: Left  Post Session:  3/10   Medications Last Reviewed:  6/3/2019    Updated Objective Findings:  None Today   TREATMENT:     THERAPEUTIC EXERCISE: (30 minutes):  Exercises per grid below to improve mobility and strength. Required minimal verbal cues to promote proper body alignment, promote proper body posture and promote proper body mechanics. Progressed complexity of movement as indicated. MANUAL THERAPY: (10 minutes): Joint mobilization and Soft tissue mobilization was utilized and necessary because of the patient's restricted joint motion, painful spasm, loss of articular motion and restricted motion of soft tissue.    Patient received passive physiological mobilizations in abduction and ER, both to tolerance, 20 second holds at available end range, patient supine, shoulder in LPP     Date:  19 Date  19 Date:  19 Date:  19 Date:  19 Date  6-3-19 Activity/Exercise Parameters  Parameters Parameters Parameters    UBE 6 minutes  Level 1 8 minutes  Level 1 4/4 1.0  4/4 1.0  1/1 2.0  3/3  Level 1   Table slide flexion  x10  On swiss ball on mat table  To tolerance  x10  On swiss ball on mat table  To tolerance  x10 on swiss ball on mat table to tolerance x10 swiss ball on mat table to tolerance x10 swiss ball on mat table to tolerance    Wand AAROM 20x each direction (flexion, extension, abduction, ER, IR)  Standing abduction to tolerance x10  x10 abduction in stand x10 abduction in stand     Pulley FE to tolerance  x10  FE to tolerance  x10  FE, abduction, IR to tolerance x10  FE, abduction, IR to tolerance x10  FE, abduction, IR to tolerance x10  FE, abduction, IR to tolerance x10    Rows   RTB 2x10  RTB 2x10 RTB  2x10 GTB  2x10   Ms   RTB 2x10  RTB 2x10  RTB 2x10  GTB  2x10   IR/ER     RTB 2x10 each    Scaption     1# 2x10 B      MODALITIES: (5 mins) moist heat to L upper trap    Treatment/Session Summary:    · Response to Treatment:  Pt is showing steady progress in her motion, however passive abduction is still very limited, to roughly  degrees, with pain being most limiting factor. · Communication/Consultation:  Continue HEP  · Equipment provided today:  None today  · Recommendations/Intent for next treatment session: Next visit will focus on improving her R shoulder motion and scapular strength.   Treatment Plan of Care Effective Dates:  5/6/2019 TO 7/8/2019   Total Treatment Billable Duration: 40 minutes - 30 mins therapeutic exercise and 10 minutes manual therapy  PT Patient Time In/Time Out  Time In: 0730  Time Out: 0815  Fatimah Merida PT, DPT    Future Appointments   Date Time Provider Ayla Wills   6/4/2019  7:30 AM [facility]   6/10/2019  7:30 AM Serina Adler PT, DPT [facility]   6/11/2019  7:30 AM Serina Adler PT, DPT [facility]   7/8/2019  9:50 AM [facility]   7/15/2019  8:30 AM Karlos Moncada DO [facility]   10/21/2019  8:15 AM Mitchell Nava MD [facility]   10/30/2019  8:30 AM GVLLE ECHO 26 [facility]   11/5/2019  9:00 AM Cleopatra Galan DO [facility]

## 2019-06-04 ENCOUNTER — HOSPITAL ENCOUNTER (OUTPATIENT)
Dept: PHYSICAL THERAPY | Age: 64
Discharge: HOME | End: 2019-06-04
Payer: MEDICARE

## 2019-06-04 PROCEDURE — 97140 MANUAL THERAPY 1/> REGIONS: CPT

## 2019-06-04 PROCEDURE — 97110 THERAPEUTIC EXERCISES: CPT

## 2019-06-04 NOTE — PROGRESS NOTES
Bree Lyles  : 1955  [place]  Secondary: [facility], [address], [place] [address]  [phone]   [phone]        OUTPATIENT PHYSICAL THERAPY: Daily Treatment Note 2019     ICD-10: Treatment Diagnosis: Pain in left shoulder (M25.512); Other sprain of left shoulder joint, sequela (E95.015G)  Precautions/Allergies:   Codeine; Cenestin [synthetic conj estrogens a]; Doxycycline; Levaquin [levofloxacin]; Pcn [penicillins]; and Sulfa dyne   MD Orders: evaluate and treat MEDICAL/REFERRING DIAGNOSIS:  Pain in left shoulder [M25.512]   DATE OF ONSET: 2018  REFERRING PHYSICIAN: Salma Coyle MD  RETURN PHYSICIAN APPOINTMENT: 6-10-19       Pre-treatment Symptoms/Complaints:  Patient reports continued stiffness and soreness in the shoulders. Pain: Initial: Pain Intensity 1: 4  Pain Location 1: Shoulder  Pain Orientation 1: Left  Post Session:  3/10   Medications Last Reviewed:  2019    Updated Objective Findings:  None Today   TREATMENT:     THERAPEUTIC EXERCISE: (30 minutes):  Exercises per grid below to improve mobility and strength. Required minimal verbal cues to promote proper body alignment, promote proper body posture and promote proper body mechanics. Progressed complexity of movement as indicated. MANUAL THERAPY: (10 minutes): Joint mobilization and Soft tissue mobilization was utilized and necessary because of the patient's restricted joint motion, painful spasm, loss of articular motion and restricted motion of soft tissue.    Patient received passive physiological mobilizations in abduction and ER, both to tolerance, 20 second holds at available end range, patient supine, shoulder in LPP     Date:  19 Date  19 Date:  19 Date:  19 Date:  19 Date  19   Activity/Exercise Parameters  Parameters Parameters Parameters    UBE 6 minutes  Level 1 8 minutes  Level 1 4/4 1.0  4/4 1.0  1/1 2.0  3/3  Level 1   Table slide flexion  x10  On swiss ball on mat table  To tolerance  x10  On swiss ball on mat table  To tolerance  x10 on swiss ball on mat table to tolerance x10 swiss ball on mat table to tolerance x10 swiss ball on mat table to tolerance    Wand AAROM 20x each direction (flexion, extension, abduction, ER, IR)  Standing abduction to tolerance x10  x10 abduction in stand x10 abduction in stand     Pulley FE to tolerance  x10  FE to tolerance  x10  FE, abduction, IR to tolerance x10  FE, abduction, IR to tolerance x10  FE, abduction, IR to tolerance x10  FE, abduction, IR to tolerance x10    Rows   RTB 2x10  RTB 2x10 RTB  2x10 GTB  2x10   Ms   RTB 2x10  RTB 2x10  RTB 2x10  GTB  2x10   IR/ER     RTB 2x10 each    Scaption     1# 2x10 B      MODALITIES: (5 mins) moist heat to L upper trap    Treatment/Session Summary:    · Response to Treatment:  Pt is showing steady progress in her motion, however passive abduction is still very limited, to roughly  degrees, with pain being most limiting factor. Instructed to focus on her abduction motion aggressively compared to other movements/exercises. · Communication/Consultation:  Continue HEP  · Equipment provided today:  None today  · Recommendations/Intent for next treatment session: Next visit will focus on improving her R shoulder motion and scapular strength.   Treatment Plan of Care Effective Dates:  5/6/2019 TO 7/8/2019   Total Treatment Billable Duration: 40 minutes - 30 mins therapeutic exercise and 10 minutes manual therapy  PT Patient Time In/Time Out  Time In: 0730  Time Out: 0815  Hemal Malloy PT, DPT    Future Appointments   Date Time Provider Ayla Wills   6/10/2019  7:30 AM Lissy Ramey, PT, DPT [facility]   6/11/2019  7:30 AM Lissy Ramey, PT, DPT [facility]   7/8/2019  9:50 AM GVLLE [facility] AICD 62 SSA UCDG UCD   7/15/2019  8:30 AM Chon Deborah Sotomayor, [address]   10/21/2019  8:15 AM Faustino Desai MD [facility]   10/30/2019  8:30 AM [facility]   11/5/2019  9:00 AM L[facility] Morales [facility]

## 2019-06-10 ENCOUNTER — HOSPITAL ENCOUNTER (OUTPATIENT)
Dept: PHYSICAL THERAPY | Age: 64
Discharge: HOME | End: 2019-06-10
Payer: MEDICARE

## 2019-06-10 PROCEDURE — 97140 MANUAL THERAPY 1/> REGIONS: CPT

## 2019-06-10 PROCEDURE — 97110 THERAPEUTIC EXERCISES: CPT

## 2019-06-10 NOTE — PROGRESS NOTES
Suzy Toribio  : 1955  [place]  Secondary: Jose Alberto Lizarraga at [place] [address], [address], [place] [address]  [phone]   [phone]        OUTPATIENT PHYSICAL THERAPY: Daily Treatment Note 6/10/2019     ICD-10: Treatment Diagnosis: Pain in left shoulder (M25.512); Other sprain of left shoulder joint, sequela (P93.535U)  Precautions/Allergies:   Codeine; Cenestin [synthetic conj estrogens a]; Doxycycline; Levaquin [levofloxacin]; Pcn [penicillins]; and Sulfa dyne   MD Orders: evaluate and treat MEDICAL/REFERRING DIAGNOSIS:  Pain in left shoulder [M25.512]   DATE OF ONSET: 2018  REFERRING PHYSICIAN: Javed Green MD  RETURN PHYSICIAN APPOINTMENT: 6-10-19       Pre-treatment Symptoms/Complaints:  Patient reports continued stiffness and soreness in the shoulders. Pain: Initial: Pain Intensity 1: 4  Pain Location 1: Shoulder  Pain Orientation 1: Left  Post Session:  3/10   Medications Last Reviewed:  6/10/2019    Updated Objective Findings:  None Today   TREATMENT:     THERAPEUTIC EXERCISE: (30 minutes):  Exercises per grid below to improve mobility and strength. Required minimal verbal cues to promote proper body alignment, promote proper body posture and promote proper body mechanics. Progressed complexity of movement as indicated. MANUAL THERAPY: (10 minutes): Joint mobilization and Soft tissue mobilization was utilized and necessary because of the patient's restricted joint motion, painful spasm, loss of articular motion and restricted motion of soft tissue.    Patient received passive physiological mobilizations in abduction and ER, both to tolerance, 20 second holds at available end range, patient supine, shoulder in LPP     Date:  19 Date  19 Date:  19 Date:  19 Date:  19 Date  6-10-19   Activity/Exercise Parameters  Parameters Parameters Parameters UBE 6 minutes  Level 1 8 minutes  Level 1 4/4 1.0  4/4 1.0  1/1 2.0  3/3  Level 1   Table slide flexion  x10  On swiss ball on mat table  To tolerance  x10  On swiss ball on mat table  To tolerance  x10 on swiss ball on mat table to tolerance x10 swiss ball on mat table to tolerance x10 swiss ball on mat table to tolerance    Wand AAROM 20x each direction (flexion, extension, abduction, ER, IR)  Standing abduction to tolerance x10  x10 abduction in stand x10 abduction in stand     Pulley FE to tolerance  x10  FE to tolerance  x10  FE, abduction, IR to tolerance x10  FE, abduction, IR to tolerance x10  FE, abduction, IR to tolerance x10  FE, abduction, IR to tolerance x10    Rows   RTB 2x10  RTB 2x10 RTB  2x10 GTB  2x10   Ms   RTB 2x10  RTB 2x10  RTB 2x10  GTB  2x10   IR/ER     RTB 2x10 each    Scaption     1# 2x10 B      MODALITIES: (5 mins) moist heat to L upper trap    Treatment/Session Summary:    · Response to Treatment:  Pt is showing steady progress in her motion, however passive abduction is still very limited, to roughly  degrees, with pain being most limiting factor. Instructed to focus on her abduction motion aggressively compared to other movements/exercises. · Communication/Consultation:  Continue HEP  · Equipment provided today:  None today  · Recommendations/Intent for next treatment session: Next visit will focus on improving her R shoulder motion and scapular strength.   Treatment Plan of Care Effective Dates:  5/6/2019 TO 7/8/2019   Total Treatment Billable Duration: 40 minutes - 30 mins therapeutic exercise and 10 minutes manual therapy  PT Patient Time In/Time Out  Time In: 0730  Time Out: 0815  Alvarez Lowe, PT, DPT    Future Appointments   Date Time Provider Ayla Elma   6/11/2019  7:30 AM [facility], [facility]   7/8/2019  9:50 AM GVLLE REMOTE AICD 62 SSA UCDG UCD   7/15/2019  8:30 AM Holly Givens DO BSUG BSUG   10/21/2019  8:15 AM Kenisha Carmichael MD SSA [facility]   10/30/2019  8:30 AM [facility]CHO [facility]   11/5/2019  9:00 AM Anna Marie Matthews [facility]

## 2019-06-10 NOTE — PROGRESS NOTES
Jak Burns  : 1955  [place]  Secondary: Jose Alberto Lizarraga at [place] [address], [address], [place] [address]  [phone]   [phone]        Outpatient PHYSICAL THERAPY: PHYSICIAN COMMUNICATION    REFERRING PHYSICIAN: Britt Zeng MD  Return Physician Appointment: 6-10-19 at 1 PM  MEDICAL/REFERRING DIAGNOSIS:  · Pain in left shoulder [M25.512]  ATTENDANCE: Jak Burns has attended 11 out of 11 visits, with 0 cancellation(s) and 0 no shows. ASSESSMENT:  DATE: 6/10/2019    PROGRESS: Jak Burns continues to report pain and stiffness at the shoulder, despite strong attention to ROM improvement at the L shoulder. Her passive abduction is limited to 80-90 degrees, with pain being the biggest restrictor in progress. Her forward elevation is roughly 140 degrees, and ER is reaching near full motion. I am most concerned about her empty end feel and abduction motion. She reports compliance with her home exercises and we have concentrated greatly on motion during her therapy sessions.      RECOMMENDATIONS: Continued focus on motion; follow-up with patient about device/sensation at her L pectoral region and its influence on her progress    Thank you for this referral,  Sp Merida, PT, DPT

## 2019-06-11 ENCOUNTER — HOSPITAL ENCOUNTER (OUTPATIENT)
Dept: PHYSICAL THERAPY | Age: 64
Discharge: HOME | End: 2019-06-11
Payer: MEDICARE

## 2019-06-11 PROCEDURE — 97140 MANUAL THERAPY 1/> REGIONS: CPT

## 2019-06-11 PROCEDURE — 97110 THERAPEUTIC EXERCISES: CPT

## 2019-06-11 NOTE — PROGRESS NOTES
Zoe Romero  : 1955  [place]  Secondary: Jose Alberto Lizarraga at [facility], [address], [place] [address]  [phone]   [phone]        OUTPATIENT PHYSICAL THERAPY: Daily Treatment Note 2019     ICD-10: Treatment Diagnosis: Pain in left shoulder (M25.512); Other sprain of left shoulder joint, sequela (X20.329U)  Precautions/Allergies:   Codeine; Cenestin [synthetic conj estrogens a]; Doxycycline; Levaquin [levofloxacin]; Pcn [penicillins]; and Sulfa dyne   MD Orders: evaluate and treat MEDICAL/REFERRING DIAGNOSIS:  Pain in left shoulder [M25.512]   DATE OF ONSET: 2018  REFERRING PHYSICIAN: Amy Hurst MD  RETURN PHYSICIAN APPOINTMENT: 6-10-19       Pre-treatment Symptoms/Complaints:  Patient reports MD told her to continue with PT for 4 weeks, and had mentioned possible surgery, but patient is very opposed to surgery. Pain: Initial: Pain Intensity 1: 4  Pain Location 1: Shoulder  Pain Orientation 1: Left  Post Session:  3/10   Medications Last Reviewed:  2019    Updated Objective Findings:  None Today   TREATMENT:     THERAPEUTIC EXERCISE: (30 minutes):  Exercises per grid below to improve mobility and strength. Required minimal verbal cues to promote proper body alignment, promote proper body posture and promote proper body mechanics. Progressed complexity of movement as indicated. MANUAL THERAPY: (10 minutes): Joint mobilization and Soft tissue mobilization was utilized and necessary because of the patient's restricted joint motion, painful spasm, loss of articular motion and restricted motion of soft tissue.    Patient received standing AAROM in FE and abduction, x10 each with 5 second holds at end range of motion, clinician providing over-pressure at end range of active movement     Date  19 Date:  19 Date:  19 Date:  19 Date  6-10-19 Date  6-11-19   Activity/Exercise  Parameters Parameters Parameters     UBE 8 minutes  Level 1 4/4 1.0  4/4 1.0  1/1 2.0  3/3  Level 1 4/4 - 8 minutes  Level 1   Finger ladder        Forward and abduction   x5 each    Table slide flexion  x10  On swiss ball on mat table  To tolerance  x10 on swiss ball on mat table to tolerance x10 swiss ball on mat table to tolerance x10 swiss ball on mat table to tolerance     Wand AAROM Standing abduction to tolerance x10  x10 abduction in stand x10 abduction in stand      Pulley FE to tolerance  x10  FE, abduction, IR to tolerance x10  FE, abduction, IR to tolerance x10  FE, abduction, IR to tolerance x10  FE, abduction, IR to tolerance x10  FE to tolerance  x20    Rows  RTB 2x10  RTB 2x10 RTB  2x10 GTB  2x10 7 lb cables  2x10   Ms  RTB 2x10  RTB 2x10  RTB 2x10  GTB  2x10    IR/ER    RTB 2x10 each     Scaption    1# 2x10 B       MODALITIES: (5 mins) moist heat to L upper trap    Treatment/Session Summary:    · Response to Treatment:  Pt demonstrated much improved AAROM today during standing manual work, reaching near 160 FE and 140 abduction   · Communication/Consultation:  Continue HEP  · Equipment provided today:  None today  · Recommendations/Intent for next treatment session: Next visit will focus on improving her R shoulder motion and scapular strength.   Treatment Plan of Care Effective Dates:  5/6/2019 TO 7/8/2019   Total Treatment Billable Duration: 40 minutes - 30 mins therapeutic exercise and 10 minutes manual therapy  PT Patient Time In/Time Out  Time In: 0730  Time Out: 0815  Damon Carreno PT, DPT    Future Appointments   Date Time Provider Ayla Wills   6/17/2019  7:30 AM Meagan Lima PT DPT [facility]   6/18/2019  7:30 AM Meagan Lima PT DPT ROBERT [facility]   6/24/2019  7:30 AM Meagan Lima PT DPT [facility]   6/25/2019  7:30 AM Meagan Lima PT, DPT [facility]   7/1/2019  7:30 AM Meagan Lima PT, DPT [facility]   7/2/2019  7:30 AM Glen Gonzalez PT, [facility]   7/8/2019  7:30 AM Glen Gonzalez PT, DPT [facility]   7/8/2019  9:50 AM [facility]   7/9/2019  7:30 AM Glen Gonzalez PT, DPT [facility]   7/15/2019  8:30 AM Aura Valentin DO BSUG BSUG   10/21/2019  8:15 AM Edilia Gonzales MD [facility]   10/30/2019  8:30 AM LAURENCE ECHO 26 [facility] UCD   11/5/2019  9:00 AM Colton Manzano [facility]

## 2019-06-17 ENCOUNTER — HOSPITAL ENCOUNTER (OUTPATIENT)
Dept: PHYSICAL THERAPY | Age: 64
Discharge: HOME | End: 2019-06-17
Payer: MEDICARE

## 2019-06-17 PROCEDURE — 97140 MANUAL THERAPY 1/> REGIONS: CPT

## 2019-06-17 PROCEDURE — 97110 THERAPEUTIC EXERCISES: CPT

## 2019-06-17 NOTE — PROGRESS NOTES
[place]  : 1955  [place]  Secondary: Jose Alberto Lizarraga at [facility], [address], [place] [address]  [phone]   [phone]        OUTPATIENT PHYSICAL THERAPY: Daily Treatment Note 2019     ICD-10: Treatment Diagnosis: Pain in left shoulder (M25.512); Other sprain of left shoulder joint, sequela (I81.768S)  Precautions/Allergies:   Codeine; Cenestin [synthetic conj estrogens a]; Doxycycline; Levaquin [levofloxacin]; Pcn [penicillins]; and Sulfa dyne   MD Orders: evaluate and treat MEDICAL/REFERRING DIAGNOSIS:  Pain in left shoulder [M25.512]   DATE OF ONSET: 2018  REFERRING PHYSICIAN: Sarath Barajas MD  RETURN PHYSICIAN APPOINTMENT: 6-10-19       Pre-treatment Symptoms/Complaints:  Patient reports she is doing well today, noting slowly improving motion. Pain: Initial: Pain Intensity 1: 3  Pain Location 1: Shoulder  Pain Orientation 1: Left  Post Session:  3/10   Medications Last Reviewed:  2019    Updated Objective Findings:  None Today   TREATMENT:     THERAPEUTIC EXERCISE: (30 minutes):  Exercises per grid below to improve mobility and strength. Required minimal verbal cues to promote proper body alignment, promote proper body posture and promote proper body mechanics. Progressed complexity of movement as indicated. MANUAL THERAPY: (10 minutes): Joint mobilization and Soft tissue mobilization was utilized and necessary because of the patient's restricted joint motion, painful spasm, loss of articular motion and restricted motion of soft tissue.    Patient received standing AAROM in FE and abduction, x10 each with 5 second holds at end range of motion, clinician providing over-pressure at end range of active movement     Date  19 Date:  19 Date:  19 Date:  19 Date  6-10-19 Date  19   Activity/Exercise  Parameters Parameters Parameters UBE 8 minutes  Level 1 4/4 1.0  4/4 1.0  1/1 2.0  3/3  Level 1 4/4 - 8 minutes  Level 1   Finger ladder        Forward and abduction   x5 each    Table slide flexion  x10  On swiss ball on mat table  To tolerance  x10 on swiss ball on mat table to tolerance x10 swiss ball on mat table to tolerance x10 swiss ball on mat table to tolerance     Wand AAROM Standing abduction to tolerance x10  x10 abduction in stand x10 abduction in stand      Pulley FE to tolerance  x10  FE, abduction, IR to tolerance x10  FE, abduction, IR to tolerance x10  FE, abduction, IR to tolerance x10  FE, abduction, IR to tolerance x10  FE to tolerance  x20    Rows  RTB 2x10  RTB 2x10 RTB  2x10 GTB  2x10 7 lb cables  2x10   Ms  RTB 2x10  RTB 2x10  RTB 2x10  GTB  2x10    IR/ER    RTB 2x10 each     Scaption    1# 2x10 B       MODALITIES: (5 mins) moist heat to L upper trap    Treatment/Session Summary:    · Response to Treatment:  Pt demonstrated much improved AAROM today during standing manual work, with abduction being the most limited movement at this time. · Communication/Consultation:  Continue HEP  · Equipment provided today:  None today  · Recommendations/Intent for next treatment session: Next visit will focus on improving her R shoulder motion and scapular strength.   Treatment Plan of Care Effective Dates:  5/6/2019 TO 7/8/2019   Total Treatment Billable Duration: 40 minutes - 30 mins therapeutic exercise and 10 minutes manual therapy  PT Patient Time In/Time Out  Time In: 0730  Time Out: 0815  Granville Mohs, PT, DPT    Future Appointments   Date Time Provider Ayla Wills   6/18/2019  7:30 AM Angelo Porras PT, DPT [facility]   6/24/2019  7:30 AM Angelo Porras PT, DPT [facility]   6/25/2019  7:30 AM Angelo Porras PT, DPT [facility]   7/1/2019  7:30 AM Angelo Porras PT, DPT [facility]   7/2/2019  7:30 AM Ashanti Membreno PT [facility]   7/5/2019  1:45 PM Ivette Koehler DO SSA [facility]   7/8/2019  7:30 AM Cristi Gibson, PT, DPT [facility]   7/8/2019  9:50 AM LAURENCE ARROYO AICD 62 SSA UCDG UCD   7/9/2019  7:30 AM Cristi Gibson, PT, [facility]PT [facility]   7/15/2019  8:30 AM Brenda Cooper DO BSUG BSUG   10/21/2019  8:15 AM Kenisha Carmichael MD [facility]   10/30/2019  8:30 AM LAURENCE ECHO 26 SSA UCDG UCD   11/5/2019  9:00 AM B[facility] Cooley DO [facility]

## 2019-06-18 ENCOUNTER — HOSPITAL ENCOUNTER (OUTPATIENT)
Dept: PHYSICAL THERAPY | Age: 64
Discharge: HOME | End: 2019-06-18
Payer: MEDICARE

## 2019-06-18 PROCEDURE — 97140 MANUAL THERAPY 1/> REGIONS: CPT

## 2019-06-18 PROCEDURE — 97110 THERAPEUTIC EXERCISES: CPT

## 2019-06-18 NOTE — PROGRESS NOTES
Quoc Daley  : 1955  [place]  Secondary: [facility], [address], [place] [address]  [phone]   [phone]        OUTPATIENT PHYSICAL THERAPY: Daily Treatment Note 2019     ICD-10: Treatment Diagnosis: Pain in left shoulder (M25.512); Other sprain of left shoulder joint, sequela (O75.018G)  Precautions/Allergies:   Codeine; Cenestin [synthetic conj estrogens a]; Doxycycline; Levaquin [levofloxacin]; Pcn [penicillins]; and Sulfa dyne   MD Orders: evaluate and treat MEDICAL/REFERRING DIAGNOSIS:  Pain in left shoulder [M25.512]   DATE OF ONSET: 2018  REFERRING PHYSICIAN: Deangelo Velasquez MD  RETURN PHYSICIAN APPOINTMENT: 6-10-19       Pre-treatment Symptoms/Complaints:  Patient reports she is doing well today, and feels like she has had big improvements in function and pain just over the past week. Pain: Initial: Pain Intensity 1: 3  Pain Location 1: Shoulder  Pain Orientation 1: Left  Post Session:  3/10   Medications Last Reviewed:  2019    Updated Objective Findings:  None Today   TREATMENT:     THERAPEUTIC EXERCISE: (30 minutes):  Exercises per grid below to improve mobility and strength. Required minimal verbal cues to promote proper body alignment, promote proper body posture and promote proper body mechanics. Progressed complexity of movement as indicated. MANUAL THERAPY: (10 minutes): Joint mobilization and Soft tissue mobilization was utilized and necessary because of the patient's restricted joint motion, painful spasm, loss of articular motion and restricted motion of soft tissue.    Patient received standing AAROM in FE and abduction, x10 each with 5 second holds at end range of motion, clinician providing over-pressure at end range of active movement     Date  19 Date:  19 Date:  19 Date:  19 Date  6-10-19 Date  19 Activity/Exercise  Parameters Parameters Parameters     UBE 8 minutes  Level 1 4/4 1.0  4/4 1.0  1/1 2.0  3/3  Level 1 4/4 - 8 minutes  Level 1   Finger ladder        Forward and abduction   x5 each    Table slide flexion  x10  On swiss ball on mat table  To tolerance  x10 on swiss ball on mat table to tolerance x10 swiss ball on mat table to tolerance x10 swiss ball on mat table to tolerance     Wand AAROM Standing abduction to tolerance x10  x10 abduction in stand x10 abduction in stand      Pulley FE to tolerance  x10  FE, abduction, IR to tolerance x10  FE, abduction, IR to tolerance x10  FE, abduction, IR to tolerance x10  FE, abduction, IR to tolerance x10  FE to tolerance  x20    Rows  RTB 2x10  RTB 2x10 RTB  2x10 GTB  2x10 7 lb cables  2x10   Ms  RTB 2x10  RTB 2x10  RTB 2x10  GTB  2x10    IR/ER    RTB 2x10 each     Scaption    1# 2x10 B       MODALITIES: (5 mins) moist heat to L upper trap    Treatment/Session Summary:    · Response to Treatment:  Pt is reaching near full PROM in all planes, and AAROM is also steadily improving. She feels like she is reaching near discharge date. · Communication/Consultation:  Continue HEP  · Equipment provided today:  None today  · Recommendations/Intent for next treatment session: Next visit will focus on improving her R shoulder motion and scapular strength.   Treatment Plan of Care Effective Dates:  5/6/2019 TO 7/8/2019   Total Treatment Billable Duration: 40 minutes - 30 mins therapeutic exercise and 10 minutes manual therapy  PT Patient Time In/Time Out  Time In: 0730  Time Out: 0815  Liyah Rodriguez PT, DPT    Future Appointments   Date Time Provider Ayla Bunni   6/24/2019  7:30 AM Liborio Swenson PT, DPT [facility]   6/25/2019  7:30 AM Liborio Swenson PT, DPT [facility]   7/1/2019  7:30 AM Liborio Swenson PT, DPT [facility]   7/2/2019  7:30 AM Katie Calderon PT [facility]   7/5/2019  1:45 PM Connor Kelly DO SSA DG D   7/8/2019  7:30 AM Eduardo Gibson, PT, DPT [facility]   7/8/2019  9:50 AM [facility]   7/9/2019  7:30 AM Eduardo Gibson, PT, DPT [facility]   7/15/2019  8:30 AM Abdiel Izquierdo DO BSUG BSUG   10/21/2019  8:15 AM Brielle Amador MD [facility]   10/30/2019  8:30 AM [facility]CHO [facility]   11/5/2019  9:00 AM Ivetet Koehler DO [facility]

## 2019-06-24 ENCOUNTER — HOSPITAL ENCOUNTER (OUTPATIENT)
Dept: PHYSICAL THERAPY | Age: 64
Discharge: HOME | End: 2019-06-24
Payer: MEDICARE

## 2019-06-24 PROCEDURE — 97140 MANUAL THERAPY 1/> REGIONS: CPT

## 2019-06-24 PROCEDURE — 97110 THERAPEUTIC EXERCISES: CPT

## 2019-06-24 NOTE — PROGRESS NOTES
Jeannette Sterling  : 1955  [place]  Secondary: Jose Alberto Lizarraga at [facility], [address], [place] [address]  [phone]   [phone]        OUTPATIENT PHYSICAL THERAPY: Daily Treatment Note 2019     ICD-10: Treatment Diagnosis: Pain in left shoulder (M25.512); Other sprain of left shoulder joint, sequela (N41.156A)  Precautions/Allergies:   Codeine; Cenestin [synthetic conj estrogens a]; Doxycycline; Levaquin [levofloxacin]; Pcn [penicillins]; and Sulfa dyne   MD Orders: evaluate and treat MEDICAL/REFERRING DIAGNOSIS:  Pain in left shoulder [M25.512]   DATE OF ONSET: 2018  REFERRING PHYSICIAN: Leander Davis MD  RETURN PHYSICIAN APPOINTMENT: 6-10-19       Pre-treatment Symptoms/Complaints:  Patient reports she is doing well today, and has no specific issues stated at the shoulder. Pain: Initial: Pain Intensity 1: 3  Pain Location 1: Shoulder  Pain Orientation 1: Left  Post Session:  3/10   Medications Last Reviewed:  2019    Updated Objective Findings:  None Today   TREATMENT:     THERAPEUTIC EXERCISE: (30 minutes):  Exercises per grid below to improve mobility and strength. Required minimal verbal cues to promote proper body alignment, promote proper body posture and promote proper body mechanics. Progressed complexity of movement as indicated. MANUAL THERAPY: (10 minutes): Joint mobilization and Soft tissue mobilization was utilized and necessary because of the patient's restricted joint motion, painful spasm, loss of articular motion and restricted motion of soft tissue.    Patient received standing AAROM in FE and abduction, x10 each with 5 second holds at end range of motion, clinician providing over-pressure at end range of active movement     Date:  19 Date:  19 Date  6-10-19 Date  19 Date  19   Activity/Exercise Parameters Parameters      UBE 4/4 1.0  1/1 2.0  3/3  Level 1 4/4 - 8 minutes  Level 1 3/3  Level 1   Finger ladder      Forward and abduction   x5 each  Forward and abduction   x5 each    Table slide flexion  x10 swiss ball on mat table to tolerance x10 swiss ball on mat table to tolerance      Wand AAROM x10 abduction in stand       Pulley FE, abduction, IR to tolerance x10  FE, abduction, IR to tolerance x10  FE, abduction, IR to tolerance x10  FE to tolerance  x20  scaption to tolerance  x20   Rows RTB 2x10 RTB  2x10 GTB  2x10 7 lb cables  2x10 7 lb cables  2x10   Ms RTB 2x10  RTB 2x10  GTB  2x10     IR/ER  RTB 2x10 each   3 lb cables  2x10 ea   Scaption  1# 2x10 B        MODALITIES: (5 mins) moist heat to L upper trap    Treatment/Session Summary:    · Response to Treatment:  Pt is reaching near full PROM in all planes, and AAROM is also steadily improving. We discussed continuing our plan of care for 2-4 more visits. · Communication/Consultation:  Continue HEP  · Equipment provided today:  None today  · Recommendations/Intent for next treatment session: Next visit will focus on improving her R shoulder motion and scapular strength.   Treatment Plan of Care Effective Dates:  5/6/2019 TO 7/8/2019   Total Treatment Billable Duration: 40 minutes - 30 mins therapeutic exercise and 10 minutes manual therapy  PT Patient Time In/Time Out  Time In: 0730  Time Out: 0815  Best English PT, DPT    Future Appointments   Date Time Provider Ayla Wills   6/25/2019  7:30 AM Alej[facility] Ramon PT, [facility]   7/1/2019  7:30 AM [facility] PT[facility]   7/2/2019  7:30 AM Sharon Shin PT [facility]   7/5/2019  1:45 PM Derik Wheeler DO [facility]   7/8/2019  7:30 AM Alejandra Ramon PT, [facility]PT [facility]   7/8/2019  9:50 AM [facility]   7/9/2019  7:30 AM Alejandra Ramon PT, DPT [facility]   7/15/2019  8:30 AM DO JUANA Haque [facility]   10/21/2019  8:15 AM Reza Scott MD [place] [facility]   10/30/2019  8:30 AM LAURENCE ECHO 26 [facility]   11/5/2019  9:00 AM Jaimie Parish DO [facility]

## 2019-06-25 ENCOUNTER — HOSPITAL ENCOUNTER (OUTPATIENT)
Dept: PHYSICAL THERAPY | Age: 64
Discharge: HOME | End: 2019-06-25
Payer: MEDICARE

## 2019-06-25 PROCEDURE — 97110 THERAPEUTIC EXERCISES: CPT

## 2019-06-25 PROCEDURE — 97140 MANUAL THERAPY 1/> REGIONS: CPT

## 2019-06-25 NOTE — THERAPY DISCHARGE
Amy Haynes  : 1955  [place]  Secondary: Jose Alberto Lizarraga at [address]  [facility], [address], [place] [address]  [phone]   [phone]          OUTPATIENT PHYSICAL THERAPY:Discharge Summary 2019   ICD-10: Treatment Diagnosis: Pain in left shoulder (M25.512); Other sprain of left shoulder joint, sequela (X19.066Q)  Precautions/Allergies:   Codeine; Cenestin [synthetic conj estrogens a]; Doxycycline; Levaquin [levofloxacin]; Pcn [penicillins]; and Sulfa dyne   MD Orders: evaluate and treat MEDICAL/REFERRING DIAGNOSIS:  Pain in left shoulder [M25.512]   DATE OF ONSET: 2018  REFERRING PHYSICIAN: Serafin Espinoza MD  RETURN PHYSICIAN APPOINTMENT: 6-10-19     ASSESSMENT:  Ms. Avril Berg initiated PT on 19 and has attended 15 therapy sessions. She has made great progress in therapy, reporting improved motion, function, and pain. Having met all long term goals, and patient agreeable and independent with her rehab program, she will be discharged from formal PT at this time. TREATMENT PLAN:  Effective Dates: 2019 TO 2019   GOALS: (Goals have been discussed and agreed upon with patient.)    ALL GOALS MET 2019    SHORT-TERM FUNCTIONAL GOALS: Time Frame: 3 weeks  1. Patient will be compliant with HEP focused on upper extremity strengthening and ROM. 2.  Patient will rate L shoulder pain no greater than 6/10 for improved tolerance to daily and work duties. DISCHARGE GOALS: Time Frame: 6 weeks  1. Patient will be independent with comprehensive HEP focused on continued performance of upper extremity strengthening and ROM activities. 2.  Patient will rate L shoulder pain no greater than 3/10 and which does not significantly interfere with daily or work duties for return to previous level of function.    3.  Patient will demonstrate near symmetrical bilateral UE AROM for optimum functional mobility with daily and work duties. 4.  Patient will demonstrate near symmetrical bilateral UE strength grades in the above tested planes for optimum performance and safety with daily and work duties. Rehabilitation Potential For Stated Goals: Good  Regarding Mary Koehler's therapy, I certify that the treatment plan above will be carried out by a therapist or under their direction. Thank you for this referral,  Rahel Rea, PT, DPT               The information in this section was collected on 19 (except where otherwise noted). HISTORY:   History of Present Injury/Illness (Reason for Referral):  Patient reports gradual onset of L shoulder pain and stiffness, beginning in 2018. Her symptoms have progressively worsened since onset. She had a CT scan, which she states did not show any tears, so possible frozen shoulder. Past Medical History/Comorbidities:   Ms. Hayden Harrell  has a past medical history of Anxiety, BCC (basal cell carcinoma of skin), CAD (coronary artery disease), Chronic insomnia, Dyslipidemia, Fibromyalgia, GERD (gastroesophageal reflux disease), IBS (irritable bowel syndrome), Ischemic colitis ([address] (), Osteopenia, Overactive bladder, S/P CABG x 3 (2018), S/P implantation of automatic cardioverter/defibrillator (AICD) (2018), SCCA (squamous cell carcinoma) of skin, and Systolic congestive heart failure ([address] Ms. Hayden Harrell  has a past surgical history that includes implant breast silicone/eq (Bilateral, ); hx colonoscopy (); hx kym and bso (); hx  section (); hx breast biopsy (Left, ); hx breast augmentation (); hx heart catheterization (2018); hx coronary artery bypass graft (2018); hx tonsillectomy; hx adenoidectomy; and hx pacemaker placement (2018).   Social History/Living Environment:     Independent   Prior Level of Function/Work/Activity:  Works as  from home  Dominant Side: RIGHT  Personal Factors:          Sex:  female        Age:  70 y.o.  test   Ambulatory/Rehab Services H2 Model Falls Risk Assessment    Risk Factors:       No Risk Factors Identified Ability to Rise from Chair:       (0)  Ability to rise in a single movement    Falls Prevention Plan:       No modifications necessary   Total: (5 or greater = High Risk): 0    ©2010 [facility]. All Rights Reserved. [facility] Patent #[id number]. Federal Law prohibits the replication, distribution or use without written permission from [facility] [employer]     Current Medications:       Current Outpatient Medications:     cephALEXin (KEFLEX) 500 mg capsule, Take 1 Cap by mouth two (2) times a day., Disp: 14 Cap, Rfl: 0    pravastatin (PRAVACHOL) 80 mg tablet, TAKE 1 TABLET BY MOUTH EVERY DAY AT NIGHT, Disp: , Rfl: 11    pantoprazole (PROTONIX) 40 mg tablet, TAKE 1 TABLET BY MOUTH DAILY, Disp: 90 Tab, Rfl: 1    carvedilol (COREG) 6.25 mg tablet, Take 6.25 mg by mouth two (2) times daily (with meals). , Disp: , Rfl:     nitroglycerin (NITROSTAT) 0.4 mg SL tablet, 1 Tab by SubLINGual route every five (5) minutes as needed for Chest Pain., Disp: 25 Tab, Rfl: 11    aspirin delayed-release 81 mg tablet, Take 81 mg by mouth nightly., Disp: , Rfl:    Date Last Reviewed:  6/25/2019     Number of Personal Factors/Comorbidities that affect the Plan of Care: 0: LOW COMPLEXITY   EXAMINATION:   Observation/Orthostatic Postural Assessment:          Patient appears in healthy condition, fair posture with bilaterally rounded shoulders  Palpation:          Mild tenderness at anterior aspect of R shoulder and rotator cuff insertion   ROM:          R shoulder AROM: flexion 160, abduction 150, ER 55  Strength:          4/5 in all planes since ROM is not full    Body Structures Involved:  1. Bones  2. Joints  3. Muscles Body Functions Affected:  1. Sensory/Pain  2. Movement Related Activities and Participation Affected:  1.  General Tasks and Demands  2. Mobility  3. Self Care  4. Domestic Life  5. Interpersonal Interactions and Relationships  6. Community, Social and [place]   Number of elements (examined above) that affect the Plan of Care: 3: MODERATE COMPLEXITY   CLINICAL PRESENTATION:   Presentation: Stable and uncomplicated: LOW COMPLEXITY   CLINICAL DECISION MAKING:   Outcome Measure: Tool Used: Disabilities of the Arm, Shoulder and Hand (DASH) Questionnaire - Quick Version  Score:  Initial: 34/55  Most Recent: 12/55 (Date: 6-25-19 )   Interpretation of Score: The DASH is designed to measure the activities of daily living in person's with upper extremity dysfunction or pain. Each section is scored on a 1-5 scale, 5 representing the greatest disability. The scores of each section are added together for a total score of 55.

## 2019-07-01 ENCOUNTER — APPOINTMENT (OUTPATIENT)
Dept: PHYSICAL THERAPY | Age: 64
End: 2019-07-01

## 2019-07-02 ENCOUNTER — APPOINTMENT (OUTPATIENT)
Dept: PHYSICAL THERAPY | Age: 64
End: 2019-07-02

## 2019-07-08 ENCOUNTER — APPOINTMENT (OUTPATIENT)
Dept: PHYSICAL THERAPY | Age: 64
End: 2019-07-08

## 2019-07-09 ENCOUNTER — APPOINTMENT (OUTPATIENT)
Dept: PHYSICAL THERAPY | Age: 64
End: 2019-07-09

## 2019-07-15 PROBLEM — N30.10 INTERSTITIAL CYSTITIS: Status: ACTIVE | Noted: 2019-07-15

## 2019-08-27 ENCOUNTER — HOSPITAL ENCOUNTER (OUTPATIENT)
Dept: PHYSICAL THERAPY | Age: 64
Discharge: HOME | End: 2019-08-27
Attending: OBSTETRICS & GYNECOLOGY
Payer: MEDICARE

## 2019-08-27 DIAGNOSIS — N32.81 OAB (OVERACTIVE BLADDER): ICD-10-CM

## 2019-08-27 PROCEDURE — 97110 THERAPEUTIC EXERCISES: CPT

## 2019-08-27 PROCEDURE — 97161 PT EVAL LOW COMPLEX 20 MIN: CPT

## 2019-08-27 NOTE — THERAPY EVALUATION
Ha Vargas  : 1955  [place]  Secondary: Jose Alberto Lizarraga at [facility], [address], [place] [address]  [phone]   [phone]        OUTPATIENT PHYSICAL THERAPY:Initial Assessment 2019   ICD-10: Treatment Diagnosis: R27.8 Lack of coordination (muscle incoordination), N39.41 Urge incontinence, R35.0 Frequency of micturition, R35.1 Nocturia   Precautions/Allergies:   Codeine; Cenestin [synthetic conj estrogens a]; Doxycycline; Levaquin [levofloxacin]; Pcn [penicillins]; and Sulfa dyne   TREATMENT PLAN:  Effective Dates: 2019 TO 2019 (90 days). Frequency/Duration: 1 time a week for 90 Day(s) MEDICAL/REFERRING DIAGNOSIS:  OAB (overactive bladder) [N32.81]   DATE OF ONSET: Chronic  REFERRING PHYSICIAN: Pritesh Jensen DO MD Orders: evaluate and treat  Return MD Appointment: 2019     INITIAL ASSESSMENT:  Ms. Gifty Arriola presents with mildly overactive pelvic floor muscle (PFM), lack of coordination, timing, awareness, endurance and strength. She has slight compensatory patterns with PFM contraction, contributing to urinary incontinence (UI). She has a large fluid intake volume when could be contributing to urinary frequency, but overactive could also be exacerbating this. She will benefit from skilled PT with an emphasis on PFM retraining, core retraining, body mechanics and bladder health education to improve her UUI and improve urinary control. She is pleasant and motivated. PROBLEM LIST (Impacting functional limitations):  1. Decreased Strength  2. Decreased [place] with Home Exercise Program  3. Decreased timing, coordination and awareness INTERVENTIONS PLANNED:  1. Electrical Stimulation  2. Home Exercise Program (HEP)  3. Manual Therapy  4. Neuromuscular Re-education/Strengthening  5. Therapeutic Activites  6.  Therapeutic Exercise/Strengthening     GOALS: (Goals have been discussed and agreed upon with patient.)  Short-Term Functional Goals: Time Frame: 4 weeks  Pt will demonstrate I with basic PFM HEP to improve awareness, coordination, and timing of PFM. Pt with demonstrate normal voluntary relaxation of the pelvic floor muscle group to improve pelvic floor ROM. Pt will demonstrate 10 quick flicks of the pelvic floor muscle group, without compensation, to implement urge suppression appropriately with urgency of urination and decrease the number of pads used per day. Discharge Goals: Time Frame: By discharge  1. Pt will report decreased urinary frequency by 50%. 2. Pt will demonstrate I with advanced core stabilization and general mobility program to facilitate carry over and I management of symptoms. Outcome Measure:   Pelvic Floor Impact Questionnaire (PFIQ-7)  Score (out of 300) Initial: 8/27/2019  Bladder/urinary: 57  Bowel/rectum: 0  Vagina/pelvis: 0  Total: 57 Most Recent:      Interpretation of Score: This survey asks questions concerning certain bowel, bladder, or pelvic symptoms and how much these symptoms interfere with daily activities. Each section is scored on a 0-3 scale, 3 representing the greatest disability. The scores of each section (out of 100) are added together for a total score out of 300. Medical Necessity:   · Patient is expected to demonstrate progress in strength, range of motion, coordination and functional technique to improve urinary control. Reason for Services/Other Comments:  · Patient continues to require skilled intervention due to above mentioned deficits. Total Duration: Evaluation 35 minutes, treatment 10 minutes  PT Patient Time In/Time Out  Time In: 1100  Time Out: 1200    Rehabilitation Potential For Stated Goals: Good  Regarding Piter Koehler's therapy, I certify that the treatment plan above will be carried out by a therapist or under their direction.   Thank you for this referral,  Liv Berry, PT, DPT PAIN/SUBJECTIVE:   Initial: Pain Intensity 1: 0  Post Session:  0/10     HISTORY:   History of Present Injury/Illness (Reason for Referral):  Pt is a 69 yo F referred to PFPT urinary urgency and frequency. Pt states that this is a chronic problem. She has taken Vesicare in the past but discontinued as this dried her out. This did help. She was recently seen by Dr. Lobito Lewis and put on Uribel, bladder ease and Marshmallow root. This has helped with urinary frequency. Urinary: Frequency 25 x/day, 1-2 x/night. Positive for urge urinary incontinence (UUI), urgency, frequency, nocturia, hx of frequent UTIs (believes she's had 3 in the last year). Pt does not use pads for protection; 4-6 pads per day (PPD). Denies stress urinary incontinence (JOSELUIS), mixed urinary incontinence (ARISTIDES), incomplete emptying, urinary hesitancy, dysuria, hematuria, . Fluid intake: 8-10 16oz water/day; bladder irritants include: 1-2c coffee per week  Bowel: Frequency every other day. Negative for pain with bowel movement (BM), pushing/straining with BM, incomplete emptying, fecal incontinence, constipation. Use of stool softeners or laxatives? No  Sexual: Pt is sexually active. Male partners. Contraception/birth control: hysterectomy. Negative for dyspareunia. Pt reports starting premarin cream which has resolved pain with intercourse  Pelvic Organ Prolapse/Pelvic Pain: Denies pain complaints. Past Medical History/Comorbidities:   Ms. Mack Mosquera  has a past medical history of Anxiety, BCC (basal cell carcinoma of skin), CAD (coronary artery disease), Chronic insomnia, Dyslipidemia, Fibromyalgia, GERD (gastroesophageal reflux disease), Hypercholesterolemia, IBS (irritable bowel syndrome), [facility]ic [facility] [address] (2014), Osteopenia, Overactive bladder, S/P CABG x 3 (12/2018), S/P implantation of automatic cardioverter/defibrillator (AICD) (12/2018), SCCA (squamous cell carcinoma) of skin, and Systolic congestive heart failure ([facility] [address] Ms. Carola Powell  has a past surgical history that includes implant breast silicone/eq (Bilateral, ); hx colonoscopy (); hx kym and bso (); hx  section (); hx breast biopsy (Left, ); hx breast augmentation (); hx heart catheterization (2018); hx coronary artery bypass graft (2018); hx tonsillectomy; hx adenoidectomy; and hx pacemaker placement (2018). Social History/Living Environment:     Pt lives with   Have you ever had any pelvic trauma (orthopedic in nature, fall, MVA, etc.)? No  Have you ever experienced any unwanted physical or sexual contact? No  Have you ever experienced any form of medical trauma (GYN, urological, GI, etc)? 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 Ambulatory/Rehab Services H2 Model Falls Risk Assessment    Risk Factors:       No Risk Factors Identified Ability to Rise from Chair:       (0)  Ability to rise in a single movement    Falls Prevention Plan:       No modifications necessary   Total: (5 or greater = High Risk): 0     [facility]. All Rights Reserved. [facility] Patent #[id number]. Federal Law prohibits the replication, distribution or use without written permission from [facility] [employer]     Current Medications:       Current Outpatient Medications:     conjugated estrogens (PREMARIN) 0.625 mg/gram vaginal cream, Insert 0.5 g into vagina Three (3) times a week., Disp: 30 g, Rfl: 12    [facility]-me blue-sod phos-phsal-hyo (URIBEL) 118-10-40.8-36 mg cap capsule, Take 1 Cap by mouth four (4) times daily. , Disp: 30 Cap, Rfl: 12    pravastatin (PRAVACHOL) 80 mg tablet, TAKE 1 TABLET BY MOUTH EVERY DAY AT NIGHT, Disp: , Rfl: 11    pantoprazole (PROTONIX) 40 mg tablet, TAKE 1 TABLET BY MOUTH DAILY, Disp: 90 Tab, Rfl: 1    carvedilol (COREG) 6.25 mg tablet, Take 6.25 mg by mouth two (2) times daily (with meals). , Disp: , Rfl:     nitroglycerin (NITROSTAT) 0.4 mg SL tablet, 1 Tab by SubLINGual route every five (5) minutes as needed for Chest Pain., Disp: 25 Tab, Rfl: 11    aspirin delayed-release 81 mg tablet, Take 81 mg by mouth nightly., Disp: , Rfl:    Date Last Reviewed:  8/27/2019   Number of Personal Factors/Comorbidities that affect the Plan of Care: 1-2: MODERATE COMPLEXITY   EXAMINATION:   OBSERVATION:   External Observation:   · Voluntary Contraction: present  · Voluntary Relaxation: present    PALPATION: Non tender with palpation via vaginal canal, mild tension noted in deep PFM layers. No reproduction of urgency with palpation. RANGE OF MOTION: Good, WNL    STRENGTH:  P: Power, E: Endurance, R: Repetitions, QF: Quick Flicks, TrA: Transverse Abdominus  P 2/5   E 5 seconds   R 5   QF 7   TrA      COORDINATION: Mild delay with relaxation    SPECIAL TESTS:  Supine cough test: Negative  POP-Q: (Pelvic Organ Prolapse - Quantification Exam) per MD note: No POP   Body Structures Involved:  1. Nerves  2. Muscles Body Functions Affected:  1. Sensory/Pain  2. Genitourinary  3. Neuromusculoskeletal  4. Movement Related Activities and Participation Affected:  1. Learning and Applying Knowledge  2. General Tasks and Demands  3. Mobility  4. Self Care   Number of elements (examined above) that affect the Plan of Care: 1-2: LOW COMPLEXITY   CLINICAL PRESENTATION:   Presentation: Stable and uncomplicated: LOW COMPLEXITY   CLINICAL DECISION MAKING:   Use of outcome tool(s) and clinical judgement create a POC that gives a: Clear prediction of patient's progress: LOW COMPLEXITY        Tara Roach, PT, DPT    Future Appointments   Date Time Provider Ayla Wills   9/3/2019  8:00 AM Darron Locke PT, DPT [facility]   9/11/2019  9:00 AM Darron Locke PT, DPT [facility]   9/17/2019  8:00 AM Darron Locke PT, DPT [facility]   9/24/2019  8:00 AM Claudia Roach PT, DPT [facility] 10/1/2019  8:00 AM Sandy Roach, PT, DPT [facility]   10/8/2019  8:00 AM Ravindra Boyd PT, DPT [facility]   10/15/2019  8:00 AM Ravindra Boyd PT, DPT [facility]   10/21/2019  8:15 AM Aleatha Harada, MD [facility]   10/22/2019  8:00 AM Ravindra Boyd PT, DPT [facility]   10/30/2019  8:30 AM GVLLE ECHO [facility]   11/5/2019  9:00 AM Micheal Guy DO [facility]   11/11/2019 10:00 AM Serg Givens DO [facility]   11/18/2019  2:00 PM Alyssa [facility]

## 2019-08-27 NOTE — PROGRESS NOTES
Orlin Puga  : 1955  [place]  Secondary: Jose Alberto Lizarraga [facility], [address], [place] [address]  [phone]   [phone]      OUTPATIENT PHYSICAL THERAPY: Daily Treatment Note 2019   Visit Count:  1  ICD-10: Treatment Diagnosis: R27.8 Lack of coordination (muscle incoordination), N39.41 Urge incontinence, R35.0 Frequency of micturition, R35.1 Nocturia   Precautions/Allergies:   Codeine; Cenestin [synthetic conj estrogens a]; Doxycycline; Levaquin [levofloxacin]; Pcn [penicillins]; and Sulfa dyne   TREATMENT PLAN:  Effective Dates: 2019 TO 2019 (90 days). Frequency/Duration: 1 time a week for 90 Day(s) MEDICAL/REFERRING DIAGNOSIS:  OAB (overactive bladder) [N32.81]   DATE OF ONSET: Chronic  REFERRING PHYSICIAN: Gaetano Cheung DO MD Orders: evaluate and treat  Return MD Appointment: 2019     Pre-treatment Symptoms/Complaints:  Urgency/frequency, UUI  Pain: Initial: Pain Intensity 1: 0  Post Session:  0/10   Medications Last Reviewed:  2019  Updated Objective Findings:  See evaluation note from today   TREATMENT:   THERAPEUTIC EXERCISE: (10 minutes):  Exercises per grid below to improve understanding and awareness. Required moderate verbal cues to promote understanding and awareness. Progressed resistance, range, repetitions and complexity of movement as indicated. TE= therapeutic exercise, TA= therapeutic activity, MT= manual therapy, NE= neuro re-education   Date:  2019 Date:   Date:     Activity/Exercise Parameters Parameters Parameters   Bladder health edu Bladder health edu, bladder diary                                           Pt gives verbal consent to internal vaginal assessment/treatment without chaperon present.     Treatment/Session Summary:    · Response to Treatment:  Pt reports good understanding of plan of care, as well as prescribed home exercise program.  All questions were answered to pt's satisfaction. Pt was invited to call with any further questions or concerns. · Communication/Consultation:  None today  · Equipment provided today:  None today  · Recommendations/Intent for next treatment session: Next visit will focus on biofeedback, manual therapy, coordination.   Total Treatment Billable Duration: Evaluation 35 minutes, treatment 10 minutes  PT Patient Time In/Time Out  Time In: 1100  Time Out: 1200  Valorie Garcia, PT, DPT    Future Appointments   Date Time Provider Ayla Wills   9/3/2019  8:00 AM Sherlyn Roach, PT, DPT [facility]   9/11/2019  9:00 AM Sherlyn Roach, PT, DPT [facility]   9/17/2019  8:00 AM Sherlyn Roach, PT, DPT [facility]   9/24/2019  8:00 AM Kavitha Mejia, PT, DPT [facility]   10/1/2019  8:00 AM Pembrokepedro Mejia, PT, DPT [facility]   10/8/2019  8:00 AM Pembrokepedro Cisnerosor, PT, DPT [facility]   10/15/2019  8:00 AM P[place]rokepedro Mejia, PT, DPT [facility]   10/21/2019  8:15 AM Jo Shafer MD [facility]   10/22/2019  8:00 AM Kavitha Mejia, PT, DPT [facility]   10/30/2019  8:30 AM [facility]   11/5/2019  9:00 AM Howie Bonilla DO [facility]SA [facility]   11/11/2019 10:00 AM Lamont Givens DO [facility]   11/18/2019  2:00 PM Edin Trevino [facility] 25 [facility]

## 2019-09-03 ENCOUNTER — HOSPITAL ENCOUNTER (OUTPATIENT)
Dept: PHYSICAL THERAPY | Age: 64
Discharge: HOME | End: 2019-09-03
Payer: MEDICARE

## 2019-09-03 PROCEDURE — 97110 THERAPEUTIC EXERCISES: CPT

## 2019-09-03 PROCEDURE — 97530 THERAPEUTIC ACTIVITIES: CPT

## 2019-09-03 NOTE — PROGRESS NOTES
Christy Hackett  : 1955  [place]  Secondary: Jose Alberto Lizarraga at [facility], [address], [place] [address]  [phone]   [phone]      OUTPATIENT PHYSICAL THERAPY: Daily Treatment Note 9/3/2019   Visit Count:  2  ICD-10: Treatment Diagnosis: R27.8 Lack of coordination (muscle incoordination), N39.41 Urge incontinence, R35.0 Frequency of micturition, R35.1 Nocturia   Precautions/Allergies:   Codeine; Cenestin [synthetic conj estrogens a]; Doxycycline; Levaquin [levofloxacin]; Pcn [penicillins]; and Sulfa dyne   TREATMENT PLAN:  Effective Dates: 2019 TO 2019 (90 days). Frequency/Duration: 1 time a week for 90 Day(s) MEDICAL/REFERRING DIAGNOSIS:  OAB (overactive bladder) [N32.81]   DATE OF ONSET: Chronic  REFERRING PHYSICIAN: Britany Reyes DO MD Orders: evaluate and treat  Return MD Appointment: 2019     Pre-treatment Symptoms/Complaints: Pt presents with bladder diary completed. Reports decreasing fluid intake and \"sipping\" throughout the day. Pain: Initial: Pain Intensity 1: 0  Post Session:  0/10   Medications Last Reviewed:  9/3/2019  Updated Objective Findings:  resting tone: 3-5mV w/ delayed relaxation   TREATMENT:   THERAPEUTIC ACTIVITY: ( 30 minutes): Therapeutic activities per grid below to improve mobility and coordination. Required moderate verbal cues to promote improve PFM control in order to improve urge control and bladder health awareness. THERAPEUTIC EXERCISE: (20 minutes):  Exercises per grid below to improve mobility and coordination. Required moderate visual and verbal cues to promote proper body breathing techniques and PFM relaxation. Progressed resistance, range, repetitions and complexity of movement as indicated.   TE= therapeutic exercise, TA= therapeutic activity, MT= manual therapy, NE= neuro re-education   Date:  2019 Date:  9/3/2019 Date: Activity/Exercise Parameters Parameters Parameters   Bladder health [facility] Bladder health edu, bladder diary Review of bladder diary-15 minnutes    Biofeedback  Drops and PFM coordination-15 minutes    Urge suppression  15 minutes    HEP  Drops and urge suppression                        Treatment/Session Summary:    · Response to Treatment:  Pt presents with completed bladder diary. She is voiding about 10-12x/day with frequent urge and associated incontinence. She drinks a combination of water, tea and coffee. Her bladder irritants consumption often outweights her water intake. I suggested that she try to increase water intake and decreased bladder irritants, as well as decreasing fluid intake ~2-3 hours before bed to decrease nocturia. With sEMG she demonstrates slightly elevated resting tone for ~3-5mV with delayed relaxation. This improved with instruction and repetitions. She has mild accessory mm use of abdominals with PFM activation. She was instructed in drops and use of urge control (short PF) in order to improve urgency in order to reduce UI and increase time between voids. She demonstrates improved coordination and relaxation by end of session and verbalizes understanding of HEP. · Communication/Consultation:  decrease bladder irritiants and fluid intake ~2 hours before bed  · Equipment provided today:  None today  · Recommendations/Intent for next treatment session: Next visit will focus on manual therapy, flexibility, urge control.   Total Treatment Billable Duration: 50 minutes  PT Patient Time In/Time Out  Time In: 8372  Time Out: 0900  Elliott Glass, PT, DPT    Future Appointments   Date Time Provider Ayla Wills   9/11/2019  9:00 AM Benoit Roach PT, DPT [facility]   9/17/2019  8:00 AM Benoit Roach PT, DPT [facility]   9/24/2019  8:00 AM Mitchell Mandujano PT, DPT [facility]   10/1/2019  8:00 AM Mitchell Mandujano PT, DPT [facility]   10/8/2019  8:00 AM [facility]   10/15/2019  8:00 AM Gurvinder Roach PT, [facility]   10/21/2019  8:15 AM Armando Anaya MD [facility]   10/22/2019  8:00 AM Talia Duncan PT, [facility]   10/30/2019  8:30 AM [facility]   11/5/2019  9:00 AM Fareed Glass, [facility]   11/11/2019 10:00 AM Stefany Givens,  [facility]   11/18/2019  2:00 PM Jay Jay Magallon [facility] 25 [facility]

## 2019-09-24 ENCOUNTER — HOSPITAL ENCOUNTER (OUTPATIENT)
Dept: PHYSICAL THERAPY | Age: 64
Discharge: HOME | End: 2019-09-24
Payer: MEDICARE

## 2019-09-24 PROCEDURE — 97110 THERAPEUTIC EXERCISES: CPT

## 2019-09-24 PROCEDURE — 97140 MANUAL THERAPY 1/> REGIONS: CPT

## 2019-09-24 NOTE — PROGRESS NOTES
Julito Coello  : 1955  [place]  Secondary: Jose Alberto Lizarraga at [facility]  [address] , [address], [place] [address]  [phone]   [phone]      OUTPATIENT PHYSICAL THERAPY: Daily Treatment Note 2019   Visit Count:  3  ICD-10: Treatment Diagnosis: R27.8 Lack of coordination (muscle incoordination), N39.41 Urge incontinence, R35.0 Frequency of micturition, R35.1 Nocturia   Precautions/Allergies:   Codeine; Cenestin [synthetic conj estrogens a]; Doxycycline; Levaquin [levofloxacin]; Pcn [penicillins]; and Sulfa dyne   TREATMENT PLAN:  Effective Dates: 2019 TO 2019 (90 days). Frequency/Duration: 1 time a week for 90 Day(s) MEDICAL/REFERRING DIAGNOSIS:  OAB (overactive bladder) [N32.81]   DATE OF ONSET: Chronic  REFERRING PHYSICIAN: Leopoldo Tse DO MD Orders: evaluate and treat  Return MD Appointment: 2019     Pre-treatment Symptoms/Complaints: Pt states that she got a bad UTI after traveling to [place] the other week. Was given Keflex for UTI by PCP and finished up medication last night. Prior to this she was doing well with using urge suppression and techniques. Pain: Initial: Pain Intensity 1: 0  Post Session:  0/10   Medications Last Reviewed:  2019  Updated Objective Findings:  tension throughout all PFM layers, non painful. Good coordination of drop without delay upon palpation, difficulty maintaining relaxation. Minimal excursion of PFM with diaphragmatic breathing   TREATMENT:   THERAPEUTIC EXERCISE: (25 minutes):  Exercises per grid below to improve mobility and coordination. Required minimal visual, verbal and tactile cues to promote proper body breathing techniques and PFM relaxation. Progressed resistance, range, repetitions and complexity of movement as indicated. MANUAL THERAPY: (30 minutes):  Soft tissue mobilization was utilized and necessary because of the patient's restricted motion of soft tissue. TE= therapeutic exercise, TA= therapeutic activity, MT= manual therapy, NE= neuro re-education   Date:  8/27/2019 Date:  9/3/2019 Date:  9/24/2019   Activity/Exercise Parameters Parameters Parameters   Bladder health edu Bladder health edu, bladder diary Review of bladder diary-15 minnutes    Biofeedback  Drops and PFM coordination-15 minutes Drops w/ single digit manual palpation   Urge suppression  15 minutes Reviewed- doing well w/ this, pt to continue   HEP  Drops and urge suppression Drops, DB, urge suppression   Diaphragmatic breathing   6 minutes to improve PFM ROM   SKTC stretch   3x30s (B)   Happy baby stretch   3x30s     Date Type Location Comments   9/24/2019 Internal assessment/treatment Via vaginal canal Single digit manual stretch to all PFM layer w/ C/R to improve ROM and soft tissue mobility    STM adductors Skin rolling                                 Pt gives verbal consent to internal vaginal assessment/treatment without chaperon present. (Used abbreviations: MET - muscle energy technique; SCS- Strain counter strain; CTM-Connective tissue mobilizations; CR- Contract/relax; SP- Sustained pressure, TrP-Trigger point release, IASTM- Instrument assisted soft tissue mobilizations, TDN-Trigger point dry needling)    Treatment/Session Summary:    · Response to Treatment:  Pt with tension noted throughout all PFM layers today. With manual therapy and active relaxation she has improved PFM relaxation, however has difficulty maintaining after drop. She demonstrate improved ability for drops without delay today and is able to verbalize good understanding and success with use of urge suppression. With diaphragmatic breathing with has minimal excursion of PFM likely due to shortening of PFM over time due to chronic tension holding. Pt was instructed in use and relation of diaphragm and given breathing as part of HEP for this week.    · Communication/Consultation: None today  · Equipment provided today:  None today  · Recommendations/Intent for next treatment session: Next visit will focus on manual therapy, flexibility  Total Treatment Billable Duration: 30 minutes MT, 25 minutes TE  PT Patient Time In/Time Out  Time In: 0800  Time Out: 0900  Ham Paz PT, DPT    Future Appointments   Date Time Provider Ayla Elma   10/1/2019  8:00 AM Lorene Roach, PT, DPT [facility]   10/8/2019  8:00 AM Lorene Roach, PT, DPT [facility]   10/14/2019  9:00 AM Kevin Escoto PT, DPT [facility]   10/21/2019  8:15 AM Marija Hendrickson MD [facility]   10/21/2019  9:00 AM Kevin Escoto PT, [facility]PT [facility]   10/30/2019  8:30 AM [facility]   11/4/2019  9:00 AM eKvin Escoto PT, DPT [facility]   11/5/2019  9:00 AM Valentino Hark, DO [facility]SA DG D   11/11/2019  9:00 AM L[facility] Roach PT, [facility]   11/11/2019 10:00 AM Gaetano Cheung DO [facility]   11/18/2019  2:00 PM [facility] [address] [place] [address]

## 2019-10-01 ENCOUNTER — HOSPITAL ENCOUNTER (OUTPATIENT)
Dept: PHYSICAL THERAPY | Age: 64
Discharge: HOME | End: 2019-10-01
Payer: MEDICARE

## 2019-10-01 PROCEDURE — 97140 MANUAL THERAPY 1/> REGIONS: CPT

## 2019-10-01 PROCEDURE — 97110 THERAPEUTIC EXERCISES: CPT

## 2019-10-01 NOTE — PROGRESS NOTES
Angelica Counts  : 1955  [place]  Secondary: Jose Alberto Lizarraga [facility], [address], [place] [address]  [phone]   [phone]      OUTPATIENT PHYSICAL THERAPY: Daily Treatment Note 10/1/2019   Visit Count:  4  ICD-10: Treatment Diagnosis: R27.8 Lack of coordination (muscle incoordination), N39.41 Urge incontinence, R35.0 Frequency of micturition, R35.1 Nocturia   Precautions/Allergies:   Codeine; Cenestin [synthetic conj estrogens a]; Doxycycline; Levaquin [levofloxacin]; Pcn [penicillins]; and Sulfa dyne   TREATMENT PLAN:  Effective Dates: 2019 TO 2019 (90 days). Frequency/Duration: 1 time a week for 90 Day(s) MEDICAL/REFERRING DIAGNOSIS:  OAB (overactive bladder) [N32.81]   DATE OF ONSET: Chronic  REFERRING PHYSICIAN: Yong Dennis DO MD Orders: evaluate and treat  Return MD Appointment: 2019     Pre-treatment Symptoms/Complaints: Pt reports having a good week. Decreased frequency, urgency and leakage. Pain: Initial: Pain Intensity 1: 0  Post Session:  0/10   Medications Last Reviewed:  10/1/2019  Updated Objective Findings:  None Today   TREATMENT:   THERAPEUTIC EXERCISE: (30 minutes):  Exercises per grid below to improve mobility, strength and coordination. Required minimal verbal cues to promote proper body breathing techniques. Progressed resistance, range, repetitions and complexity of movement as indicated. MANUAL THERAPY: (25 minutes): Soft tissue mobilization was utilized and necessary because of the patient's restricted motion of soft tissue.    TE= therapeutic exercise, TA= therapeutic activity, MT= manual therapy, NE= neuro re-education   Date:  2019 Date:  9/3/2019 Date:  2019 Date:  10/1/2019   Activity/Exercise Parameters Parameters Parameters    Bladder health edu Bladder health edu, bladder diary Review of bladder diary-15 minnutes     Biofeedback Drops and PFM coordination-15 minutes Drops w/ single digit manual palpation    Urge suppression  15 minutes Reviewed- doing well w/ this, pt to continue    HEP  Drops and urge suppression Drops, DB, urge suppression    Diaphragmatic breathing   6 minutes to improve PFM ROM    SKTC stretch   3x30s (B) 3x30s   Happy baby stretch   3x30s 3x30s   Bridge     2x20 w/ exhale   Hip abduction    BKFO w/ YTB x25 (B)   Sit to stand    3x10 w/ YTB   Adductor stretch    3x30s   Piriformis stretch       Hamstring stretch         Date Type Location Comments   10/1/2019 Internal assessment/treatment Via vaginal canal Single digit manual stretch to all PFM layer w/ C/R to improve ROM and soft tissue mobility    STM adductors Skin rolling         Pt gives verbal consent to internal vaginal assessment/treatment without chaperon present. (Used abbreviations: MET - muscle energy technique; SCS- Strain counter strain; CTM-Connective tissue mobilizations; CR- Contract/relax; SP- Sustained pressure, TrP-Trigger point release, IASTM- Instrument assisted soft tissue mobilizations, TDN-Trigger point dry needling)    Treatment/Session Summary:    · Response to Treatment:  Pt with much improved PFM relaxation today and mobility. She is able to contract and relax voluntarily without delay and does a good job maintaining relaxation today. Basic hip strengthening with initiated today to improve him strength and core control. We discussed appropriate use of PFM and breath during transitional movements in order to improve pressure mechanics during transitional movements as not to increased pressure on bladder and PFM especially after use of urge suppression. She is progressing well towards goals.   · Communication/Consultation:  None today  · Equipment provided today:  None today  · Recommendations/Intent for next treatment session: Next visit will focus on manual therapy, flexibility  Total Treatment Billable Duration: 25 minutes MT, 30 minutes CARRILLO  PT Patient Time In/Time Out  Time In: 5781  Time Out: 1000  Juliana Mena, PT, DPT    Future Appointments   Date Time Provider Ayla Bunni   10/8/2019  8:00 AM [facility]el [facility]ach, PT[facility]   10/14/2019  9:00 AM Roel Roach PT, DPT [facility]   10/21/2019  8:15 AM Dee Grant MD [facility]   10/21/2019  9:00 AM Amy Lawson PT, DPT [facility]   10/30/2019  8:30 AM GVLLE ECHO 26 [facility]   11/4/2019  9:00 AM Roel Roach PT, [facility]PT [facility]   11/5/2019  9:00 AM Saint Leys, DO [facility]   11/11/2019  9:00 AM Roel Roach PT, DPT [facility]   11/11/2019 10:00 AM Shun Yadav DO [facility]   11/18/2019  2:00 PM Saroj Bernal [facility] 25 [facility]

## 2019-10-08 ENCOUNTER — HOSPITAL ENCOUNTER (OUTPATIENT)
Dept: PHYSICAL THERAPY | Age: 64
Discharge: HOME | End: 2019-10-08
Payer: MEDICARE

## 2019-10-08 PROCEDURE — 97110 THERAPEUTIC EXERCISES: CPT

## 2019-10-08 NOTE — PROGRESS NOTES
Antonio   : 1955  [place]  Secondary: Jose Alberto Lizarraga [facility], [address], [place] [address]  [phone]   [phone]      OUTPATIENT PHYSICAL THERAPY: Daily Treatment Note 10/8/2019   Visit Count:  5  ICD-10: Treatment Diagnosis: R27.8 Lack of coordination (muscle incoordination), N39.41 Urge incontinence, R35.0 Frequency of micturition, R35.1 Nocturia   Precautions/Allergies:   Codeine; Cenestin [synthetic conj estrogens a]; Doxycycline; Levaquin [levofloxacin]; Pcn [penicillins]; and Sulfa dyne   TREATMENT PLAN:  Effective Dates: 2019 TO 2019 (90 days). Frequency/Duration: 1 time a week for 90 Day(s) MEDICAL/REFERRING DIAGNOSIS:  OAB (overactive bladder) [N32.81]   DATE OF ONSET: Chronic  REFERRING PHYSICIAN: Rossy Oleary DO  MD Orders: evaluate and treat  Return MD Appointment: 2019     Pre-treatment Symptoms/Complaints: Pt reports increased frequency since around Wednesday. She reports some R LQ pain. She has not been checked for UTI. She is having to change her pad a little more this week due to increased leakage with urgency; about 6 pads (poise pad #2), mild saturation. Pain: Initial: Pain Intensity 1: 0  Post Session:  0/10   Medications Last Reviewed:  10/8/2019  Updated Objective Findings:  None Today   TREATMENT:   THERAPEUTIC EXERCISE: (40 minutes):  Exercises per grid below to improve mobility, strength and coordination. Required minimal verbal cues to promote proper body breathing techniques and PFM relaxation between reps. Progressed resistance, range, repetitions and complexity of movement as indicated.   TE= therapeutic exercise, TA= therapeutic activity, MT= manual therapy, NE= neuro re-education   Date:  2019 Date:  9/3/2019 Date:  2019 Date:  10/1/2019 Date:  10/8/2019   Activity/Exercise Parameters Parameters Parameters     Bladder health edu Bladder health edu, bladder diary Review of bladder diary-15 minnutes      Biofeedback  Drops and PFM coordination-15 minutes Drops w/ single digit manual palpation     Urge suppression  15 minutes Reviewed- doing well w/ this, pt to continue     HEP  Drops and urge suppression Drops, DB, urge suppression     Diaphragmatic breathing   6 minutes to improve PFM ROM     SKTC stretch   3x30s (B) 3x30s 3x30s   Happy baby stretch   3x30s 3x30s    Bridge     2x20 w/ exhale x20 w/ PF & kegel  x20 w/ YTB   Hip abduction    BKFO w/ YTB x25 (B) 3x10 BKFO w/ YTB   Sit to stand    3x10 w/ YTB 3x10   Adductor stretch    3x30s    Piriformis stretch        Hamstring stretch        Sidestepping     2x30ft w/ kegel and breathing   Monster walking     2x30ft w/ kegel and breathing     Date Type Location Comments   Not today Internal assessment/treatment Via vaginal canal Single digit manual stretch to all PFM layer w/ C/R to improve ROM and soft tissue mobility    STM adductors Skin rolling         Pt gives verbal consent to internal vaginal assessment/treatment without chaperon present. (Used abbreviations: MET - muscle energy technique; SCS- Strain counter strain; CTM-Connective tissue mobilizations; CR- Contract/relax; SP- Sustained pressure, TrP-Trigger point release, IASTM- Instrument assisted soft tissue mobilizations, TDN-Trigger point dry needling)    Treatment/Session Summary:    · Response to Treatment:  PT declined internal treatment today due to possible active UTI. Dr. Ybarra Stager office was contacted and pt to leave urine sample after session. Pt demonstrates good understanding of urinary urge control techniques, however significant difficulty implementing with increased UTI-like symptoms. Exercises were progressed to today to continue to implement kegel and breathing with movement.  She has difficulty maintaining appropriate breathing requiring occasional cuing throughout session, this did improve over time with practice.   · Communication/Consultation:  None today  · Equipment provided today:  None today  · Recommendations/Intent for next treatment session: Next visit will focus on manual therapy, flexibility  Total Treatment Billable Duration: 40 minutes TE  PT Patient Time In/Time Out  Time In: 0800  Time Out: Billy PT, DPT    Future Appointments   Date Time Provider Ayla Elma   10/8/2019  9:00 AM LAB [facility]   10/14/2019  9:00 AM Malachi [facility]ach PT, [facility]PT [facility]   10/21/2019  8:15 AM Christal Zimmer MD [facility]   10/21/2019  9:00 AM Sabi Navarro PT, DPT [facility]   10/30/2019  8:30 AM LAURENCE ECHO 26 [facility]   11/4/2019  9:00 AM Malachi Roach PT, DPT [facility]   11/5/2019  9:00 AM Graciela Moreno DO [facility]   11/11/2019  9:00 AM Malachi Roach PT, DPT [facility]   11/11/2019 10:00 AM Beryle Book, DO [facility]   11/18/2019  2:00 PM LAURENCE ARROYO [address]

## 2019-10-14 ENCOUNTER — HOSPITAL ENCOUNTER (OUTPATIENT)
Dept: PHYSICAL THERAPY | Age: 64
Discharge: HOME | End: 2019-10-14
Payer: MEDICARE

## 2019-10-14 PROCEDURE — 97530 THERAPEUTIC ACTIVITIES: CPT

## 2019-10-14 PROCEDURE — 97110 THERAPEUTIC EXERCISES: CPT

## 2019-10-14 PROCEDURE — 97140 MANUAL THERAPY 1/> REGIONS: CPT

## 2019-10-14 NOTE — PROGRESS NOTES
Aurelio Bautista  : 1955  [place]  Secondary: [facility]arg[facility], [address], [place] [address]  [phone]   [phone]      OUTPATIENT PHYSICAL THERAPY: Daily Treatment Note 10/14/2019   Visit Count:  6  ICD-10: Treatment Diagnosis: R27.8 Lack of coordination (muscle incoordination), N39.41 Urge incontinence, R35.0 Frequency of micturition, R35.1 Nocturia   Precautions/Allergies:   Codeine; Cenestin [synthetic conj estrogens a]; Doxycycline; Levaquin [levofloxacin]; Pcn [penicillins]; and Sulfa dyne   TREATMENT PLAN:  Effective Dates: 2019 TO 2019 (90 days). Frequency/Duration: 1 time a week for 90 Day(s) MEDICAL/REFERRING DIAGNOSIS:  OAB (overactive bladder) [N32.81]   DATE OF ONSET: Chronic  REFERRING PHYSICIAN: Shun Yadav DO MD Orders: evaluate and treat  Return MD Appointment: 2019     Pre-treatment Symptoms/Complaints: Pt reports having increased stress over the last two weeks which could be increasing IC symptoms. She has been using drops more which she is finding helpful. Pain: Initial: Pain Intensity 1: 0  Post Session:  0/10   Medications Last Reviewed:  10/14/2019  Updated Objective Findings:  None Today   TREATMENT:   THERAPEUTIC ACTIVITY: ( 10 minutes): Therapeutic activities per grid below to improve mobility and coordination. Required moderate verbal cues to improve understanding and vuvlar care. MANUAL THERAPY: (45 minutes): Soft tissue mobilization was utilized and necessary because of the patient's restricted motion of soft tissue.     TE= therapeutic exercise, TA= therapeutic activity, MT= manual therapy, NE= neuro re-education   Date:  2019 Date:  9/3/2019 Date:  2019 Date:  10/1/2019 Date:  10/8/2019 Date:  10/14/2019   Activity/Exercise Parameters Parameters Parameters      Bladder health edu Bladder health edu, bladder diary Review of bladder diary-15 minnutes       Biofeedback  Drops and PFM coordination-15 minutes Drops w/ single digit manual palpation      Urge suppression  15 minutes Reviewed- doing well w/ this, pt to continue      HEP  Drops and urge suppression Drops, DB, urge suppression      Diaphragmatic breathing   6 minutes to improve PFM ROM      SKTC stretch   3x30s (B) 3x30s 3x30s    Happy baby stretch   3x30s 3x30s     Bridge     2x20 w/ exhale x20 w/ PF & kegel  x20 w/ YTB    Hip abduction    BKFO w/ YTB x25 (B) 3x10 BKFO w/ YTB    Sit to stand    3x10 w/ YTB 3x10    Adductor stretch    3x30s     Piriformis stretch         Hamstring stretch         Sidestepping     2x30ft w/ kegel and breathing    Monster walking     2x30ft w/ kegel and breathing    Sexual health edu      Lubricants and role of dilators in symptoms management- 10 minutes     Date Type Location Comments   10/14/2019   Internal assessment/treatment Via vaginal canal Single digit manual stretch and 2# amielle to all PFM layer w/ C/R to improve ROM and soft tissue mobility    STM adductors Skin rolling         Pt gives verbal consent to internal vaginal assessment/treatment without chaperon present. (Used abbreviations: MET - muscle energy technique; SCS- Strain counter strain; CTM-Connective tissue mobilizations; CR- Contract/relax; SP- Sustained pressure, TrP-Trigger point release, IASTM- Instrument assisted soft tissue mobilizations, TDN-Trigger point dry needling)    Treatment/Session Summary:    · Response to Treatment:  Pt with increased tension throughout the deep PFM today. She attributes this to increased stress over the last few weeks. This is likely contributing to increased symptoms and IC flare. She is used drops regularly and finds this to be helpful. She is not doing other exercises as much. We discussed the role of dilators in symptoms management to help with PFM relaxation, especially during stressful times.  She tolerated the dilators well without increased pain/ She reports sensation of \"stretching\" during. She was provided info today regarding dilators selection and role of dilators. We will discuss self use of dilators more at next session, with continue progression and emphasis of flexibility to improve PFM relaxation.   · Communication/Consultation:  None today  · Equipment provided today:  None today  · Recommendations/Intent for next treatment session: Next visit will focus on manual therapy, progress flexibility; cat/cow, child's pose/  Total Treatment Billable Duration: 10 minutes TA, 45 minutes MT  PT Patient Time In/Time Out  Time In: 0900  Time Out: 1000  Irineo Camacho, PT, DPT    Future Appointments   Date Time Provider Ayla Wills   10/21/2019  8:15 AM Angelina Teresa MD [facility]   10/21/2019  9:00 AM [facility]   10/30/2019  8:30 AM LAURENCE ECHO [facility]   11/4/2019  9:00 AM Lad[facility]   11/5/2019  9:00 AM Eddie Paz DO [facility]   11/11/2019  9:00 AM [facility]ach [facility]   11/11/2019 10:00 AM Bandar Carver DO [facility]   11/18/2019  2:00 PM LAURENCE REMOTE [address]

## 2019-10-15 ENCOUNTER — APPOINTMENT (OUTPATIENT)
Dept: PHYSICAL THERAPY | Age: 64
End: 2019-10-15
Payer: MEDICARE

## 2019-10-22 ENCOUNTER — APPOINTMENT (OUTPATIENT)
Dept: PHYSICAL THERAPY | Age: 64
End: 2019-10-22
Payer: MEDICARE

## 2019-11-04 ENCOUNTER — HOSPITAL ENCOUNTER (OUTPATIENT)
Dept: PHYSICAL THERAPY | Age: 64
Discharge: HOME | End: 2019-11-04
Payer: MEDICARE

## 2019-11-04 PROCEDURE — 97530 THERAPEUTIC ACTIVITIES: CPT

## 2019-11-04 PROCEDURE — 97110 THERAPEUTIC EXERCISES: CPT

## 2019-11-04 PROCEDURE — 97140 MANUAL THERAPY 1/> REGIONS: CPT

## 2019-11-04 NOTE — PROGRESS NOTES
Bev Jose  : 1955  [place]  Secondary: Jose Alberto Lizarraga at [facility], [address], [place] [address]  [phone]   [phone]      OUTPATIENT PHYSICAL THERAPY: Daily Treatment Note 2019   Visit Count:  7  ICD-10: Treatment Diagnosis: R27.8 Lack of coordination (muscle incoordination), N39.41 Urge incontinence, R35.0 Frequency of micturition, R35.1 Nocturia   Precautions/Allergies:   Codeine; Cenestin [synthetic conj estrogens a]; Doxycycline; Levaquin [levofloxacin]; Pcn [penicillins]; and Sulfa dyne   TREATMENT PLAN:  Effective Dates: 2019 TO 2019 (90 days). Frequency/Duration: 1 time a week for 90 Day(s) MEDICAL/REFERRING DIAGNOSIS:  OAB (overactive bladder) [N32.81]   DATE OF ONSET: Chronic  REFERRING PHYSICIAN: Karin Moerno DO MD Orders: evaluate and treat  Return MD Appointment: 2019     Pre-treatment Symptoms/Complaints: Pt feels that she has been doing better. Stress seems to be a trigger for her. She has been doing well with leakage over the last several weeks, reports having a little this morning. Pain: Initial: Pain Intensity 1: 0  Post Session:  0/10   Medications Last Reviewed:  2019  Updated Objective Findings:  See below   Urinary: Frequency 12 x/day, 1 x/night. Positive for urge urinary incontinence (UUI)- improved, frequency- improved, hx of frequent UTIs (believes she's had 3 in the last year). Pt does use pads for protection; 3-4 pads per day (PPD); states that saturation is moderate. Denies stress urinary incontinence (JOSELUIS), mixed urinary incontinence (ARISTIDES), incomplete emptying, urinary hesitancy, dysuria, hematuria, . Fluid intake: 8-10 16oz water/day; bladder irritants include: 1-2c coffee per week  Bowel: Frequency every other day.   Negative for pain with bowel movement (BM), pushing/straining with BM, incomplete emptying, fecal incontinence, constipation. Use of stool softeners or laxatives? No  Sexual: Pt is sexually active. Male partners. Contraception/birth control: hysterectomy. Negative for dyspareunia. Pt reports starting premarin cream which has resolved pain with intercourse  Pelvic Organ Prolapse/Pelvic Pain: Denies pain complaints. TREATMENT:   THERAPEUTIC ACTIVITY: ( 15 minutes): Therapeutic activities per grid below to improve mobility and coordination. Required minimal verbal cues to promote understanding and use of dilators and improve sexual health. THERAPEUTIC EXERCISE: (15 minutes):  Exercises per grid below to improve mobility and strength. Required minimal verbal cues to promote proper body breathing techniques. Progressed resistance, range, repetitions and complexity of movement as indicated. MANUAL THERAPY: (25 minutes): Soft tissue mobilization was utilized and necessary because of the patient's restricted motion of soft tissue.     TE= therapeutic exercise, TA= therapeutic activity, MT= manual therapy, NE= neuro re-education   Date:  9/3/2019 Date:  9/24/2019 Date:  10/1/2019 Date:  10/8/2019 Date:  10/14/2019 Date:  11/4/2019   Activity/Exercise Parameters Parameters       Bladder health edu Review of bladder diary-15 minnutes        Biofeedback Drops and PFM coordination-15 minutes Drops w/ single digit manual palpation       Urge suppression 15 minutes Reviewed- doing well w/ this, pt to continue       HEP Drops and urge suppression Drops, DB, urge suppression       Diaphragmatic breathing  6 minutes to improve PFM ROM       SKTC stretch  3x30s (B) 3x30s 3x30s     Happy baby stretch  3x30s 3x30s      Bridge    2x20 w/ exhale x20 w/ PF & kegel  x20 w/ YTB  x30 w/ RTB  x30 w/ RTB & hip abd   Hip abduction   BKFO w/ YTB x25 (B) 3x10 BKFO w/ YTB     Sit to stand   3x10 w/ YTB 3x10     Adductor stretch   3x30s      Piriformis stretch         Hamstring stretch         Sidestepping    2x30ft w/ kegel and breathing Monster walking    2x30ft w/ kegel and breathing     Sexual health edu     Lubricants and role of dilators in symptoms management- 10 minutes Instruction in self use of dilators     Date Type Location Comments   11/4/2019   Internal assessment/treatment Via vaginal canal Single digit manual stretch and 1st pedraza to all PFM layer w/ C/R to improve ROM and soft tissue mobility    STM adductors Skin rolling         Pt gives verbal consent to internal vaginal assessment/treatment without chaperon present. (Used abbreviations: MET - muscle energy technique; SCS- Strain counter strain; CTM-Connective tissue mobilizations; CR- Contract/relax; SP- Sustained pressure, TrP-Trigger point release, IASTM- Instrument assisted soft tissue mobilizations, TDN-Trigger point dry needling)    Treatment/Session Summary:    · Response to Treatment:  Pt was instructed in self use of dilators for integration into HEP and as a IC symptom management technique. She demonstrates good understanding and does not have any questions. General hip strengthening was progressed with increased challenge with stability. She is progressing well towards goals.   · Communication/Consultation:  None today  · Equipment provided today:  None today  · Recommendations/Intent for next treatment session: Next visit will focus on manual therapy, progress flexibility; cat/cow, child's pose  Total Treatment Billable Duration: 15 minutes TA, 25 minutes MT, 15 minutes TE  PT Patient Time In/Time Out  Time In: 0900  Time Out: [address] Amos, PT, DPT    Future Appointments   Date Time Provider Ayla Wills   11/5/2019  9:00 AM Ahmet Ortega DO [facility]CD   11/5/2019 11:00 AM SFO US LOGIC 9 [facility]   11/11/2019 10:00 AM Tena Garay DO [facility]   11/12/2019  2:00 PM [facility]ain[facility] Kim, PT, DPT [facility]   11/18/2019  2:00 PM GVLLE REMOTE AICD 62 [facility]SA [facility]CD[facility] D   4/27/2020  8:30 AM Yuridia Almaraz MD [place] [facility]

## 2019-11-05 ENCOUNTER — HOSPITAL ENCOUNTER (OUTPATIENT)
Dept: ULTRASOUND IMAGING | Age: 64
Discharge: HOME | End: 2019-11-05
Attending: INTERNAL MEDICINE

## 2019-11-05 DIAGNOSIS — E04.9 GOITER: ICD-10-CM

## 2019-11-05 NOTE — PROGRESS NOTES
Spoke to patient, message was relayed and understanding expressed. Patient will schedule appointment with endocrinology when called.

## 2019-11-05 NOTE — PROGRESS NOTES
Let Mrs. Patric Syed know there is abnormality her left thyroid does have nodule that is abnormal. Will need to biopsied. Will refer her to Endocrinologists!

## 2019-11-12 ENCOUNTER — HOSPITAL ENCOUNTER (OUTPATIENT)
Dept: PHYSICAL THERAPY | Age: 64
Discharge: HOME | End: 2019-11-12
Payer: MEDICARE

## 2019-11-12 PROCEDURE — 97110 THERAPEUTIC EXERCISES: CPT

## 2019-11-12 NOTE — THERAPY DISCHARGE
Jasen Hubbard  : 1955  [place]  Secondary: Jose Alberto Lizarraga at [facility], [address], [place] [address]  [phone]   [phone]        OUTPATIENT PHYSICAL [facility] 2019   ICD-10: Treatment Diagnosis: R27.8 Lack of coordination (muscle incoordination), N39.41 Urge incontinence, R35.0 Frequency of micturition, R35.1 Nocturia   Precautions/Allergies:   Codeine; Cenestin [synthetic conj estrogens a]; Doxycycline; Levaquin [levofloxacin]; Pcn [penicillins]; and Sulfa dyne   TREATMENT PLAN:  Effective Dates: 2019 TO 2019 (90 days). Frequency/Duration: 1 time a week for 90 Day(s) MEDICAL/REFERRING DIAGNOSIS:  OAB (overactive bladder) [N32.81]   DATE OF ONSET: Chronic  REFERRING PHYSICIAN: Miesha Barrow DO MD Orders: evaluate and treat  Return MD Appointment: 2019   DISCHARGE ASSESSMENT: Ms. Remedios Mckay has been seen in skilled PT from 2019 to 2019, a total of 8 visits. Treatment has emphasized PFM coordination, manual therapy, bladder retraining and general strengthening. Patient responded well to therapy, with improvements in urinary frequency, urge control and incontinence. She has achieved her goals and all questions have been answered to her satisfaction. GOALS: (Goals have been discussed and agreed upon with patient.)  Short-Term Functional Goals: Time Frame: 4 weeks  Pt will demonstrate I with basic PFM HEP to improve awareness, coordination, and timing of PFM. (MET 2019)  Pt with demonstrate normal voluntary relaxation of the pelvic floor muscle group to improve pelvic floor ROM. (MET 2019)  Pt will demonstrate 10 quick flicks of the pelvic floor muscle group, without compensation, to implement urge suppression appropriately with urgency of urination and decrease the number of pads used per day.  (MET 2019)  Discharge Goals: Time Frame: By discharge  1. Pt will report decreased urinary frequency by 50%. (MET 11/12/2019)  2. Pt will demonstrate I with advanced core stabilization and general mobility program to facilitate carry over and I management of symptoms. (MET 11/12/2019)    CURRENTLY:  Urinary: Frequency 12x/day, \"every 3 hours\", 1 x/night. Positive for urge urinary incontinence (UUI)- improved, frequency- improved, hx of frequent UTIs (believes she's had 3 in the last year). Pt does use pads for protection; 3-4 pads per day (PPD); changing for hygiene. Denies stress urinary incontinence (JOSELUIS), mixed urinary incontinence (ARISTIDES), incomplete emptying, urinary hesitancy, dysuria, hematuria.            Fluid intake: 64-96oz water/day; bladder irritants include: 1-2c coffee per week  Bowel: Frequency every other day.  Negative for pain with bowel movement (BM), pushing/straining with BM, incomplete emptying, fecal incontinence, constipation. Use of stool softeners or laxatives? No  Sexual: Pt is sexually active. Male partners. Contraception/birth control: hysterectomy. Negative for dyspareunia. Pt reports starting premarin cream 3x/week which has resolved pain with intercourse. Pelvic Organ Prolapse/Pelvic Pain: Denies pain complaints. UPDATED OBJECTIVE FINDINGS:    Patient decline exam today. Outcome Measure:   Pelvic Floor Impact Questionnaire (PFIQ-7)  Score (out of 300) Initial: 8/27/2019  Bladder/urinary: 57  Bowel/rectum: 0  Vagina/pelvis: 0  Total: 57 Most Recent: 11/12/2019  Bladder/urinary: 19  Bowel/rectum: 0  Vagina/pelvis: 0  Total: 19     Interpretation of Score: This survey asks questions concerning certain bowel, bladder, or pelvic symptoms and how much these symptoms interfere with daily activities. Each section is scored on a 0-3 scale, 3 representing the greatest disability. The scores of each section (out of 100) are added together for a total score out of 300.     Total Duration:  PT Patient Time In/Time Out  Time In: 1400  Time Out: 1500    Rehabilitation Potential For Stated Goals: Good  Regarding Anuradha Koehler's therapy, I certify that the treatment plan above will be carried out by a therapist or under their direction.   Thank you for this referral,  Rosa Holly, PT, DPT

## 2019-11-12 NOTE — PROGRESS NOTES
Kim Mayorga  : 1955  [place]  Secondary: Jose Alberto Lizarraga [facility], [address], [place] [address]  [phone]   [phone]      OUTPATIENT PHYSICAL THERAPY: Daily Treatment Note 2019   Visit Count:  8  ICD-10: Treatment Diagnosis: R27.8 Lack of coordination (muscle incoordination), N39.41 Urge incontinence, R35.0 Frequency of micturition, R35.1 Nocturia   Precautions/Allergies:   Codeine; Cenestin [synthetic conj estrogens a]; Doxycycline; Levaquin [levofloxacin]; Pcn [penicillins]; and Sulfa dyne   TREATMENT PLAN:  Effective Dates: 2019 TO 2019 (90 days). Frequency/Duration: 1 time a week for 90 Day(s) MEDICAL/REFERRING DIAGNOSIS:  OAB (overactive bladder) [N32.81]   DATE OF ONSET: Chronic  REFERRING PHYSICIAN: Robert Fair DO MD Orders: evaluate and treat  Return MD Appointment: 2019     Pre-treatment Symptoms/Complaints: Pt has been doing well. She feels that she has learned a lot from PT and has better control. Dr. Arleth Paez has prescribed her Enablex, but states that her insurance will not pay for it. She is going to contact Dr. Arleth Paez about this. She has also started Lipitor. She had needle biopsy on her thyroid last week and is awaiting results. Pain: Initial: Pain Intensity 1: 0  Post Session:  0/10   Medications Last Reviewed:  2019  Updated Objective Findings:  See discharge assessment    TREATMENT:   THERAPEUTIC EXERCISE: (25 minutes):  Exercises per grid below to improve understanding and compliance. Required minimal verbal cues to promote proper body breathing techniques. Progressed resistance, range, repetitions and complexity of movement as indicated.    TE= therapeutic exercise, TA= therapeutic activity, MT= manual therapy, NE= neuro re-education   Date:  2019 Date:  10/1/2019 Date:  10/8/2019 Date:  10/14/2019 Date:  2019 Date:  2019 Activity/Exercise Parameters        Bladder health edu      Review of bladder health strategies in order to continue to improve control and frequency   Biofeedback Drops w/ single digit manual palpation        Urge suppression Reviewed- doing well w/ this, pt to continue        HEP Drops, DB, urge suppression     Review of maintenance program  Pt Q&A   Diaphragmatic breathing 6 minutes to improve PFM ROM        SKTC stretch 3x30s (B) 3x30s 3x30s      Happy baby stretch 3x30s 3x30s       Bridge   2x20 w/ exhale x20 w/ PF & kegel  x20 w/ YTB  x30 w/ RTB  x30 w/ RTB & hip abd    Hip abduction  BKFO w/ YTB x25 (B) 3x10 BKFO w/ YTB      Sit to stand  3x10 w/ YTB 3x10      Adductor stretch  3x30s       Piriformis stretch         Hamstring stretch         Sidestepping   2x30ft w/ kegel and breathing      Monster walking   2x30ft w/ kegel and breathing      Sexual health edu    Lubricants and role of dilators in symptoms management- 10 minutes Instruction in self use of dilators      Treatment/Session Summary:    · Response to Treatment:  Ms. Joshua Grimes has been seen in skilled PT from 8/27/2019 to 11/12/2019, a total of 8 visits. Treatment has emphasized PFM coordination, manual therapy, bladder retraining and general strengthening. Patient responded well to therapy, with improvements in urinary frequency, urge control and incontinence. She has achieved her goals and all questions have been answered to her satisfaction.    · Communication/Consultation:  contact MD regarding medication  · Equipment provided today:  None today  Total Treatment Billable Duration: 25 minutes TE  PT Patient Time In/Time Out  Time In: 1400  Time Out: [address], PT, DPT    Future Appointments   Date Time Provider Ayla Wills   11/18/2019  2:00 PM GVLLE REMOTE AICD 62 SSA UCD[facility] UCD   12/23/2019  9:15 AM Marcie Givens , DO [facility] B[facility]   2/11/2020  9:00 AM Daniella Damon,  SSA UCDG UCD   4/27/2020  8:30 AM Emily Marquis MD [place] [facility] 5/12/2020  9:30 AM Mary Erwin MD END BS ENDO

## 2020-02-04 ENCOUNTER — HOSPITAL ENCOUNTER (OUTPATIENT)
Dept: LAB | Age: 65
Discharge: HOME | End: 2020-02-04
Payer: MEDICARE

## 2020-02-04 DIAGNOSIS — E78.5 DYSLIPIDEMIA: ICD-10-CM

## 2020-02-04 LAB
ALBUMIN SERPL-MCNC: 3.8 G/DL (ref 3.2–4.6)
ALBUMIN/GLOB SERPL: 1 {RATIO} (ref 1.2–3.5)
ALP SERPL-CCNC: 54 U/L (ref 50–136)
ALT SERPL-CCNC: 30 U/L (ref 12–65)
ANION GAP SERPL CALC-SCNC: 8 MMOL/L (ref 7–16)
AST SERPL-CCNC: 20 U/L (ref 15–37)
BILIRUB SERPL-MCNC: 0.3 MG/DL (ref 0.2–1.1)
BUN SERPL-MCNC: 29 MG/DL (ref 8–23)
CALCIUM SERPL-MCNC: 9.3 MG/DL (ref 8.3–10.4)
CHLORIDE SERPL-SCNC: 105 MMOL/L (ref 98–107)
CHOLEST SERPL-MCNC: 156 MG/DL
CO2 SERPL-SCNC: 30 MMOL/L (ref 21–32)
CREAT SERPL-MCNC: 0.8 MG/DL (ref 0.6–1)
GLOBULIN SER CALC-MCNC: 3.7 G/DL (ref 2.3–3.5)
GLUCOSE SERPL-MCNC: 103 MG/DL (ref 65–100)
HDLC SERPL-MCNC: 50 MG/DL (ref 40–60)
HDLC SERPL: 3.1 {RATIO}
LDLC SERPL CALC-MCNC: 80.8 MG/DL
LIPID PROFILE,FLP: ABNORMAL
POTASSIUM SERPL-SCNC: 4.2 MMOL/L (ref 3.5–5.1)
PROT SERPL-MCNC: 7.5 G/DL (ref 6.3–8.2)
SODIUM SERPL-SCNC: 143 MMOL/L (ref 136–145)
TRIGL SERPL-MCNC: 126 MG/DL (ref 35–150)
VLDLC SERPL CALC-MCNC: 25.2 MG/DL (ref 6–23)

## 2020-02-04 PROCEDURE — 80053 COMPREHEN METABOLIC PANEL: CPT

## 2020-02-04 PROCEDURE — 80061 LIPID PANEL: CPT

## 2020-02-04 PROCEDURE — 36415 COLL VENOUS BLD VENIPUNCTURE: CPT

## 2020-06-11 ENCOUNTER — HOSPITAL ENCOUNTER (OUTPATIENT)
Dept: MAMMOGRAPHY | Age: 65
Discharge: HOME | End: 2020-06-11
Attending: INTERNAL MEDICINE
Payer: MEDICARE

## 2020-06-11 DIAGNOSIS — Z12.31 VISIT FOR SCREENING MAMMOGRAM: ICD-10-CM

## 2020-06-11 PROCEDURE — 77063 BREAST TOMOSYNTHESIS BI: CPT

## 2020-08-04 ENCOUNTER — HOSPITAL ENCOUNTER (OUTPATIENT)
Dept: LAB | Age: 65
Discharge: HOME | End: 2020-08-04
Payer: MEDICARE

## 2020-08-04 DIAGNOSIS — I50.22 CHRONIC SYSTOLIC CONGESTIVE HEART FAILURE (HCC): ICD-10-CM

## 2020-08-04 DIAGNOSIS — I25.10 CORONARY ARTERY DISEASE INVOLVING NATIVE CORONARY ARTERY OF NATIVE HEART WITHOUT ANGINA PECTORIS: ICD-10-CM

## 2020-08-04 DIAGNOSIS — E78.5 DYSLIPIDEMIA: ICD-10-CM

## 2020-08-04 LAB
ALBUMIN SERPL-MCNC: 3.8 G/DL (ref 3.2–4.6)
ALBUMIN/GLOB SERPL: 1.1 {RATIO} (ref 1.2–3.5)
ALP SERPL-CCNC: 59 U/L (ref 50–136)
ALT SERPL-CCNC: 29 U/L (ref 12–65)
ANION GAP SERPL CALC-SCNC: 2 MMOL/L (ref 7–16)
AST SERPL-CCNC: 24 U/L (ref 15–37)
BILIRUB SERPL-MCNC: 0.2 MG/DL (ref 0.2–1.1)
BUN SERPL-MCNC: 16 MG/DL (ref 8–23)
CALCIUM SERPL-MCNC: 9.2 MG/DL (ref 8.3–10.4)
CHLORIDE SERPL-SCNC: 109 MMOL/L (ref 98–107)
CHOLEST SERPL-MCNC: 162 MG/DL
CO2 SERPL-SCNC: 31 MMOL/L (ref 21–32)
CREAT SERPL-MCNC: 0.93 MG/DL (ref 0.6–1)
GLOBULIN SER CALC-MCNC: 3.4 G/DL (ref 2.3–3.5)
GLUCOSE SERPL-MCNC: 91 MG/DL (ref 65–100)
HDLC SERPL-MCNC: 51 MG/DL (ref 40–60)
HDLC SERPL: 3.2 {RATIO}
LDLC SERPL CALC-MCNC: 86.2 MG/DL
LIPID PROFILE,FLP: ABNORMAL
POTASSIUM SERPL-SCNC: 4.3 MMOL/L (ref 3.5–5.1)
PROT SERPL-MCNC: 7.2 G/DL (ref 6.3–8.2)
SODIUM SERPL-SCNC: 142 MMOL/L (ref 136–145)
TRIGL SERPL-MCNC: 124 MG/DL (ref 35–150)
VLDLC SERPL CALC-MCNC: 24.8 MG/DL (ref 6–23)

## 2020-08-04 PROCEDURE — 80053 COMPREHEN METABOLIC PANEL: CPT

## 2020-08-04 PROCEDURE — 36415 COLL VENOUS BLD VENIPUNCTURE: CPT

## 2020-08-04 PROCEDURE — 80061 LIPID PANEL: CPT

## 2021-03-08 ENCOUNTER — HOSPITAL ENCOUNTER (OUTPATIENT)
Dept: MAMMOGRAPHY | Age: 66
Discharge: HOME | End: 2021-03-08
Attending: NURSE PRACTITIONER
Payer: MEDICARE

## 2021-03-08 DIAGNOSIS — M85.80 OSTEOPENIA, UNSPECIFIED LOCATION: ICD-10-CM

## 2021-03-08 DIAGNOSIS — Z78.0 POSTMENOPAUSAL: ICD-10-CM

## 2021-03-08 PROCEDURE — 77080 DXA BONE DENSITY AXIAL: CPT

## 2021-03-09 NOTE — PROGRESS NOTES
DEXA showed continued loss of bone mineral density, still in osteopenia range, but worsened since last DEXA in 2015.   10-year risk of a hip fracture is 1.9% and of any major osteoporotic fracture is 11%.      Recommendations:   *daily calcium and vitamin D supplements (calcium 600mg twice a day, and vitamin D3 1,000 units daily)  *regular weight-bearing exercise to strengthen your bones   *take precautions to avoid falls   *repeat DEXA in 2 years

## 2021-08-25 ENCOUNTER — TRANSCRIBE ORDER (OUTPATIENT)
Dept: SCHEDULING | Age: 66
End: 2021-08-25

## 2021-08-25 DIAGNOSIS — Z12.31 VISIT FOR SCREENING MAMMOGRAM: Primary | ICD-10-CM

## 2021-09-14 ENCOUNTER — HOSPITAL ENCOUNTER (OUTPATIENT)
Dept: MAMMOGRAPHY | Age: 66
Discharge: HOME | End: 2021-09-14
Attending: NURSE PRACTITIONER
Payer: MEDICARE

## 2021-09-14 DIAGNOSIS — Z12.31 VISIT FOR SCREENING MAMMOGRAM: ICD-10-CM

## 2021-09-14 PROCEDURE — 77063 BREAST TOMOSYNTHESIS BI: CPT

## 2021-09-24 ENCOUNTER — HOSPITAL ENCOUNTER (OUTPATIENT)
Dept: LAB | Age: 66
Discharge: HOME | End: 2021-09-24
Payer: MEDICARE

## 2021-09-24 DIAGNOSIS — E78.5 DYSLIPIDEMIA: ICD-10-CM

## 2021-09-24 DIAGNOSIS — I42.0 DILATED CARDIOMYOPATHY (HCC): ICD-10-CM

## 2021-09-24 DIAGNOSIS — I25.10 CORONARY ARTERY DISEASE INVOLVING NATIVE CORONARY ARTERY OF NATIVE HEART WITHOUT ANGINA PECTORIS: ICD-10-CM

## 2021-09-24 LAB
ALBUMIN SERPL-MCNC: 3.4 G/DL (ref 3.2–4.6)
ALBUMIN/GLOB SERPL: 0.9 {RATIO} (ref 1.2–3.5)
ALP SERPL-CCNC: 59 U/L (ref 50–136)
ALT SERPL-CCNC: 25 U/L (ref 12–65)
ANION GAP SERPL CALC-SCNC: 4 MMOL/L (ref 7–16)
AST SERPL-CCNC: 16 U/L (ref 15–37)
BILIRUB SERPL-MCNC: 0.3 MG/DL (ref 0.2–1.1)
BUN SERPL-MCNC: 13 MG/DL (ref 8–23)
CALCIUM SERPL-MCNC: 9.4 MG/DL (ref 8.3–10.4)
CHLORIDE SERPL-SCNC: 107 MMOL/L (ref 98–107)
CHOLEST SERPL-MCNC: 181 MG/DL
CO2 SERPL-SCNC: 30 MMOL/L (ref 21–32)
CREAT SERPL-MCNC: 0.7 MG/DL (ref 0.6–1)
GLOBULIN SER CALC-MCNC: 3.7 G/DL (ref 2.3–3.5)
GLUCOSE SERPL-MCNC: 99 MG/DL (ref 65–100)
HDLC SERPL-MCNC: 42 MG/DL (ref 40–60)
HDLC SERPL: 4.3 {RATIO}
LDLC SERPL CALC-MCNC: 110.6 MG/DL
POTASSIUM SERPL-SCNC: 4.1 MMOL/L (ref 3.5–5.1)
PROT SERPL-MCNC: 7.1 G/DL (ref 6.3–8.2)
SODIUM SERPL-SCNC: 141 MMOL/L (ref 136–145)
TRIGL SERPL-MCNC: 142 MG/DL (ref 35–150)
VLDLC SERPL CALC-MCNC: 28.4 MG/DL (ref 6–23)

## 2021-09-24 PROCEDURE — 80061 LIPID PANEL: CPT

## 2021-09-24 PROCEDURE — 36415 COLL VENOUS BLD VENIPUNCTURE: CPT

## 2021-09-24 PROCEDURE — 80053 COMPREHEN METABOLIC PANEL: CPT

## 2021-10-30 ENCOUNTER — APPOINTMENT (OUTPATIENT)
Dept: GENERAL RADIOLOGY | Age: 66
End: 2021-10-30
Attending: STUDENT IN AN ORGANIZED HEALTH CARE EDUCATION/TRAINING PROGRAM
Payer: MEDICARE

## 2021-10-30 ENCOUNTER — HOSPITAL ENCOUNTER (EMERGENCY)
Age: 66
Discharge: HOME | End: 2021-10-30
Attending: STUDENT IN AN ORGANIZED HEALTH CARE EDUCATION/TRAINING PROGRAM
Payer: MEDICARE

## 2021-10-30 VITALS
BODY MASS INDEX: 22.18 KG/M2 | WEIGHT: 138 LBS | RESPIRATION RATE: 16 BRPM | HEART RATE: 65 BPM | DIASTOLIC BLOOD PRESSURE: 79 MMHG | SYSTOLIC BLOOD PRESSURE: 157 MMHG | HEIGHT: 66 IN | TEMPERATURE: 97.5 F | OXYGEN SATURATION: 97 %

## 2021-10-30 DIAGNOSIS — S70.01XA CONTUSION OF RIGHT HIP, INITIAL ENCOUNTER: Primary | ICD-10-CM

## 2021-10-30 PROCEDURE — 72100 X-RAY EXAM L-S SPINE 2/3 VWS: CPT

## 2021-10-30 PROCEDURE — 99284 EMERGENCY DEPT VISIT MOD MDM: CPT

## 2021-10-30 PROCEDURE — 71101 X-RAY EXAM UNILAT RIBS/CHEST: CPT

## 2021-10-30 PROCEDURE — 73502 X-RAY EXAM HIP UNI 2-3 VIEWS: CPT

## 2021-10-30 PROCEDURE — 74011636637 HC RX REV CODE- 636/637: Performed by: EMERGENCY MEDICINE

## 2021-10-30 PROCEDURE — 96372 THER/PROPH/DIAG INJ SC/IM: CPT

## 2021-10-30 PROCEDURE — 74011250637 HC RX REV CODE- 250/637: Performed by: EMERGENCY MEDICINE

## 2021-10-30 PROCEDURE — 74011250636 HC RX REV CODE- 250/636: Performed by: STUDENT IN AN ORGANIZED HEALTH CARE EDUCATION/TRAINING PROGRAM

## 2021-10-30 PROCEDURE — 74011250637 HC RX REV CODE- 250/637: Performed by: STUDENT IN AN ORGANIZED HEALTH CARE EDUCATION/TRAINING PROGRAM

## 2021-10-30 PROCEDURE — A9270 NON-COVERED ITEM OR SERVICE: HCPCS | Performed by: EMERGENCY MEDICINE

## 2021-10-30 RX ORDER — HYDROCODONE BITARTRATE AND ACETAMINOPHEN 7.5; 325 MG/1; MG/1
1 TABLET ORAL
Status: DISCONTINUED | OUTPATIENT
Start: 2021-10-30 | End: 2021-10-30

## 2021-10-30 RX ORDER — KETOROLAC TROMETHAMINE 30 MG/ML
30 INJECTION, SOLUTION INTRAMUSCULAR; INTRAVENOUS
Status: COMPLETED | OUTPATIENT
Start: 2021-10-30 | End: 2021-10-30

## 2021-10-30 RX ORDER — OXYCODONE HYDROCHLORIDE 5 MG/1
10 TABLET ORAL
Status: COMPLETED | OUTPATIENT
Start: 2021-10-30 | End: 2021-10-30

## 2021-10-30 RX ORDER — HYDROCODONE BITARTRATE AND ACETAMINOPHEN 5; 325 MG/1; MG/1
1-2 TABLET ORAL
Qty: 12 TABLET | Refills: 0 | Status: SHIPPED | OUTPATIENT
Start: 2021-10-30 | End: 2021-11-02 | Stop reason: SDUPTHER

## 2021-10-30 RX ORDER — CYCLOBENZAPRINE HCL 5 MG
10 TABLET ORAL
Qty: 20 TABLET | Refills: 0 | Status: SHIPPED | OUTPATIENT
Start: 2021-10-30 | End: 2021-10-30

## 2021-10-30 RX ORDER — PREDNISONE 20 MG/1
40 TABLET ORAL DAILY
Qty: 6 TABLET | Refills: 0 | Status: SHIPPED | OUTPATIENT
Start: 2021-10-30 | End: 2021-11-02

## 2021-10-30 RX ORDER — ONDANSETRON 8 MG/1
8 TABLET, ORALLY DISINTEGRATING ORAL
Qty: 12 TABLET | Refills: 1 | Status: SHIPPED | OUTPATIENT
Start: 2021-10-30 | End: 2021-11-02 | Stop reason: SDUPTHER

## 2021-10-30 RX ORDER — DOCUSATE SODIUM 100 MG/1
200 CAPSULE, LIQUID FILLED ORAL DAILY
Qty: 8 CAPSULE | Refills: 0 | Status: SHIPPED | OUTPATIENT
Start: 2021-10-30 | End: 2021-11-03

## 2021-10-30 RX ORDER — ONDANSETRON 4 MG/1
4 TABLET, ORALLY DISINTEGRATING ORAL
Status: COMPLETED | OUTPATIENT
Start: 2021-10-30 | End: 2021-10-30

## 2021-10-30 RX ORDER — IBUPROFEN 800 MG/1
800 TABLET ORAL
Qty: 20 TABLET | Refills: 0 | Status: SHIPPED | OUTPATIENT
Start: 2021-10-30 | End: 2021-10-30

## 2021-10-30 RX ADMIN — PREDNISONE 60 MG: 10 TABLET ORAL at 17:12

## 2021-10-30 RX ADMIN — KETOROLAC TROMETHAMINE 30 MG: 30 INJECTION, SOLUTION INTRAMUSCULAR at 15:15

## 2021-10-30 RX ADMIN — ONDANSETRON 4 MG: 4 TABLET, ORALLY DISINTEGRATING ORAL at 16:16

## 2021-10-30 RX ADMIN — OXYCODONE 10 MG: 5 TABLET ORAL at 16:17

## 2021-10-30 NOTE — ED NOTES
I have reviewed discharge instructions with the patient. The patient verbalized understanding. Patient left ED via Discharge Method: wheelchair to Home with spouse. Opportunity for questions and clarification provided. Patient given 4 scripts. To continue your aftercare when you leave the hospital, you may receive an automated call from our care team to check in on how you are doing. This is a free service and part of our promise to provide the best care and service to meet your aftercare needs.  If you have questions, or wish to unsubscribe from this service please call [phone]. Thank you for Choosing our [address] [place] [address] Emergency Department.

## 2021-10-30 NOTE — ED PROVIDER NOTES
40-year-old female patient presenting via EMS with reports of right hip pain after a witnessed fall from standing height while patient was ambulating through a parking lot. She states she slipped on uneven piece of pavement, states her foot went into a hole causing her to fall to the right side. She reports pain over the right hip, right chest wall. She states she did graze the ground with her head but denies any loss of consciousness, changes in vision, nausea vomiting or dizziness. She reports no headache and does not use blood thinners. Patient attempted to ambulate but was unable to secondary to pain in the hip.            Past Medical History:   Diagnosis Date    Anxiety disorder     Basal cell carcinoma of skin     Chronic insomnia     Coronary artery disease     COVID-19 2020    mild case     Dyslipidemia     Fibromyalgia     Gastroesophageal reflux disease     Interstitial cystitis     Irritable bowel syndrome     Ischemic colitis ([facility] [address]     Multiple thyroid nodules     S/P thyroid bx 2019-benign    Osteopenia     Overactive bladder     Squamous cell carcinoma of skin     Systolic congestive heart failure ([facility] [address]        Past Surgical History:   Procedure Laterality Date    HX BREAST AUGMENTATION Bilateral     HX BREAST BIOPSY Left     HX  SECTION      HX COLONOSCOPY  2015    WNL    HX CORONARY ARTERY BYPASS GRAFT  2018    CABG X 3    HX HEART CATHETERIZATION  2018    HX PACEMAKER PLACEMENT  2018    and defibrillator    HX ADRIANE AND BSO  12    HX TONSIL AND ADENOIDECTOMY  as a child         Family History:   Problem Relation Age of Onset    Arthritis-osteo Mother     Coronary Artery Disease Mother         CABG, [place]    Cancer Father         Carina Jo, non smoker    Breast Cancer Paternal Grandmother 67    Thyroid Cancer Neg Hx     Thyroid Disease Neg Hx        Social History     Socioeconomic History    Marital status:  Spouse name: Not on file    Number of children: Not on file    Years of education: Not on file    Highest education level: Not on file   Occupational History    Occupation: Beautician/Works also at [employer]   Tobacco Use    Smoking status: Former Smoker     Packs/day: 15.00     Years: 1.00     Pack years: 15.00     Quit date: 3/1/1984     Years since quittin.6    Smokeless tobacco: Never Used   Substance and Sexual Activity    Alcohol use: No    Drug use: No    Sexual activity: Yes     Partners: Male     Birth control/protection: Surgical   Other Topics Concern     Service Not Asked    Blood Transfusions Not Asked    Caffeine Concern Not Asked    Occupational Exposure Not Asked    Hobby Hazards Not Asked    Sleep Concern Not Asked    Stress Concern Not Asked    Weight Concern Not Asked    Special Diet Not Asked    Back Care Not Asked    Exercise Not Asked    Bike Helmet Not Asked    Seat Belt Not Asked    Self-Exams Not Asked   Social History Narrative    Not on file     Social Determinants of Health     Financial Resource Strain:     Difficulty of Paying Living Expenses:    Food Insecurity:     Worried About Running Out of Food in the Last Year:     [address] in the Last Year:    Transportation Needs:     Lack of Transportation (Medical):  Lack of Transportation (Non-Medical):    Physical Activity:     Days of Exercise per Week:     Minutes of Exercise per Session:    Stress:     Feeling of Stress :    Social Connections:     Frequency of Communication with Friends and Family:     Frequency of Social Gatherings with Friends and Family:     Attends Buddhist Services:     Active Member of Clubs or Organizations:     Attends Club or Organization Meetings:     Marital Status:    Intimate Partner Violence:     Fear of Current or Ex-Partner:     Emotionally Abused:     Physically Abused:     Sexually Abused:           ALLERGIES: Codeine, Cenestin [synthetic conj estrogens a], Doxycycline, Levaquin [levofloxacin], Pcn [penicillins], and Sulfa dyne    Review of Systems   Constitutional: Negative for chills, diaphoresis and fever. HENT: Negative for congestion, sneezing and sore throat. Eyes: Negative for visual disturbance. Respiratory: Negative for cough, chest tightness, shortness of breath and wheezing. Cardiovascular: Negative for chest pain and leg swelling. Gastrointestinal: Negative for abdominal pain, blood in stool, diarrhea, nausea and vomiting. Endocrine: Negative for polyuria. Genitourinary: Negative for difficulty urinating, dysuria, flank pain, hematuria and urgency. Musculoskeletal: Positive for arthralgias. Negative for back pain, myalgias, neck pain and neck stiffness. Skin: Negative for color change and rash. Neurological: Negative for dizziness, syncope, speech difficulty, weakness, light-headedness, numbness and headaches. Psychiatric/Behavioral: Negative for behavioral problems. All other systems reviewed and are negative. Vitals:    10/30/21 1500 10/30/21 1503   BP: (!) 157/79    Pulse: 65    Resp: 16    Temp: 97.5 °F (36.4 °C)    SpO2: 96% 97%   Weight: 62.6 kg (138 lb)    Height: 5' 6\" (1.676 m)             Physical Exam  Vitals and nursing note reviewed. Constitutional:       General: She is not in acute distress. Appearance: She is well-developed. She is not diaphoretic. Comments: Alert and oriented to person place and time. No acute distress, speaks in clear, fluid sentences. HENT:      Head: Normocephalic and atraumatic. Right Ear: External ear normal.      Left Ear: External ear normal.      Nose: Nose normal.   Eyes:      Pupils: Pupils are equal, round, and reactive to light. Cardiovascular:      Rate and Rhythm: Normal rate and regular rhythm. Heart sounds: Normal heart sounds. No murmur heard. No friction rub. No gallop.     Pulmonary:      Effort: Pulmonary effort is normal. No respiratory distress. Breath sounds: Normal breath sounds. No stridor. No decreased breath sounds, wheezing, rhonchi or rales. Comments: Clear to auscultation throughout. Chest:      Chest wall: No tenderness. Comments: No pain with palpation of the chest wall. No palpable subcu emphysema or crepitus. Abdominal:      General: There is no distension. Palpations: Abdomen is soft. There is no mass. Tenderness: There is no abdominal tenderness. There is no guarding or rebound. Hernia: No hernia is present. Musculoskeletal:         General: No tenderness or deformity. Normal range of motion. Cervical back: Normal range of motion. Comments: Patient reports pain with flexion and extension of the right hip, pain is increased over the right hip with movement of the left lower extremity as well. No significant increase in discomfort with external/internal rotation of either hip. Some pain with lateral compression of the right hip. Skin:     General: Skin is warm and dry. Neurological:      Mental Status: She is alert and oriented to person, place, and time. Cranial Nerves: No cranial nerve deficit. MDM  Number of Diagnoses or Management Options  Contusion of right hip, initial encounter  Diagnosis management comments: Placed orders for 1 dose of IM Toradol, plain film imaging of the chest wall, right ribs and right hip.    5:27 PM  Care assumed of patient at 4 PM, little to no relief after Toradol. Patient unable to bear weight. Received 10 mg of oxycodone and 30 to 40 minutes later patient is ambulating albeit with antalgic gait, but she is able to bear weight with mild assistance. Patient has access to either a cane or walker at home. We will give 1 dose of prednisone here, few days of prednisone and a few days of Norco to go home with. Her main objection to opiates is nausea yet she says she can take Percocets.   Colace and Zofran written to accompany the 12 hydrocodone pills       Amount and/or Complexity of Data Reviewed  Tests in the radiology section of CPT®: ordered and reviewed (XR HIP RT W OR WO PELV 2-3 VWS   Final Result    Normal right hip. XR RIBS RT W PA CXR MIN 3 V   Final Result    No rib fracture identified. XR SPINE LUMB 2 OR 3 V   Final Result    Spondylosis at L2-3.  No fractures.                     )  Tests in the medicine section of CPT®: ordered and reviewed  Obtain history from someone other than the patient: yes    Risk of Complications, Morbidity, and/or Mortality  Presenting problems: moderate  Diagnostic procedures: low  Management options: moderate    Patient Progress  Patient progress: improved         Procedures

## 2021-10-30 NOTE — ED TRIAGE NOTES
Pt arrived to ED via EMS from [place] parking [place] of the mall. Per EMS pt fell while walking to her car. Pt states she tripped in a hole in the parking lot. Pt c/o right hip pain when ambulatory.     Per EMS  160/80  69  18  96%  97.5

## 2021-10-30 NOTE — DISCHARGE INSTRUCTIONS
Take the medication prescribed as directed. Arrange follow-up with your primary care provider for further evaluation and recheck.   Return for worsening symptoms, concerns or questions    Use cold packs 5 to 10 minutes, every hour to every-other-hour, for first 48 hours,  Then switch to a heating pad, 5 to 10 minutes, every hour to every-other-hour, for a few days,  Do not go to heat, right away

## 2021-11-08 ENCOUNTER — HOSPITAL ENCOUNTER (OUTPATIENT)
Dept: CT IMAGING | Age: 66
Discharge: HOME | End: 2021-11-08
Attending: PHYSICIAN ASSISTANT
Payer: MEDICARE

## 2021-11-08 DIAGNOSIS — M54.41 ACUTE RIGHT-SIDED LOW BACK PAIN WITH RIGHT-SIDED SCIATICA: ICD-10-CM

## 2021-11-08 DIAGNOSIS — M25.551 RIGHT HIP PAIN: ICD-10-CM

## 2021-11-08 DIAGNOSIS — M53.3 SACRAL BACK PAIN: ICD-10-CM

## 2021-11-08 DIAGNOSIS — M51.36 DDD (DEGENERATIVE DISC DISEASE), LUMBAR: ICD-10-CM

## 2021-11-08 PROCEDURE — 72192 CT PELVIS W/O DYE: CPT

## 2022-01-21 ENCOUNTER — HOSPITAL ENCOUNTER (OUTPATIENT)
Dept: LAB | Age: 67
Discharge: HOME | End: 2022-01-21
Payer: MEDICARE

## 2022-01-21 DIAGNOSIS — E78.5 DYSLIPIDEMIA: ICD-10-CM

## 2022-01-21 DIAGNOSIS — I25.10 CORONARY ARTERY DISEASE INVOLVING NATIVE CORONARY ARTERY OF NATIVE HEART WITHOUT ANGINA PECTORIS: ICD-10-CM

## 2022-01-21 DIAGNOSIS — I42.0 DILATED CARDIOMYOPATHY (HCC): ICD-10-CM

## 2022-01-21 LAB
ALBUMIN SERPL-MCNC: 3.6 G/DL (ref 3.2–4.6)
ALBUMIN/GLOB SERPL: 0.9 {RATIO} (ref 1.2–3.5)
ALP SERPL-CCNC: 79 U/L (ref 50–136)
ALT SERPL-CCNC: 25 U/L (ref 12–65)
ANION GAP SERPL CALC-SCNC: 1 MMOL/L (ref 7–16)
AST SERPL-CCNC: 14 U/L (ref 15–37)
BILIRUB SERPL-MCNC: 0.3 MG/DL (ref 0.2–1.1)
BUN SERPL-MCNC: 28 MG/DL (ref 8–23)
CALCIUM SERPL-MCNC: 9.5 MG/DL (ref 8.3–10.4)
CHLORIDE SERPL-SCNC: 107 MMOL/L (ref 98–107)
CHOLEST SERPL-MCNC: 197 MG/DL
CO2 SERPL-SCNC: 31 MMOL/L (ref 21–32)
CREAT SERPL-MCNC: 0.76 MG/DL (ref 0.6–1)
GLOBULIN SER CALC-MCNC: 3.8 G/DL (ref 2.3–3.5)
GLUCOSE SERPL-MCNC: 96 MG/DL (ref 65–100)
HDLC SERPL-MCNC: 42 MG/DL (ref 40–60)
HDLC SERPL: 4.7 {RATIO}
LDLC SERPL CALC-MCNC: 121.2 MG/DL
POTASSIUM SERPL-SCNC: 4.4 MMOL/L (ref 3.5–5.1)
PROT SERPL-MCNC: 7.4 G/DL (ref 6.3–8.2)
SODIUM SERPL-SCNC: 139 MMOL/L (ref 136–145)
TRIGL SERPL-MCNC: 169 MG/DL (ref 35–150)
VLDLC SERPL CALC-MCNC: 33.8 MG/DL (ref 6–23)

## 2022-01-21 PROCEDURE — 80053 COMPREHEN METABOLIC PANEL: CPT

## 2022-01-21 PROCEDURE — 36415 COLL VENOUS BLD VENIPUNCTURE: CPT

## 2022-01-21 PROCEDURE — 80061 LIPID PANEL: CPT

## 2022-03-01 ENCOUNTER — HOSPITAL ENCOUNTER (OUTPATIENT)
Dept: MRI IMAGING | Age: 67
Discharge: HOME | End: 2022-03-01
Attending: PHYSICIAN ASSISTANT
Payer: MEDICARE

## 2022-03-01 DIAGNOSIS — M54.16 LUMBAR RADICULOPATHY: ICD-10-CM

## 2022-03-01 DIAGNOSIS — M47.816 FACET ARTHROPATHY, LUMBAR: ICD-10-CM

## 2022-03-01 DIAGNOSIS — M51.36 DDD (DEGENERATIVE DISC DISEASE), LUMBAR: ICD-10-CM

## 2022-03-01 DIAGNOSIS — M84.48XD SACRAL INSUFFICIENCY FRACTURE WITH ROUTINE HEALING: ICD-10-CM

## 2022-03-01 PROCEDURE — 72148 MRI LUMBAR SPINE W/O DYE: CPT

## 2022-03-18 PROBLEM — I25.10 CAD (CORONARY ARTERY DISEASE): Status: ACTIVE | Noted: 2018-12-17

## 2022-03-18 PROBLEM — I44.7 LBBB (LEFT BUNDLE BRANCH BLOCK): Status: ACTIVE | Noted: 2018-10-31

## 2022-03-19 PROBLEM — N30.10 INTERSTITIAL CYSTITIS: Status: ACTIVE | Noted: 2019-07-15

## 2022-03-19 PROBLEM — I50.20 SYSTOLIC CONGESTIVE HEART FAILURE (HCC): Status: ACTIVE | Noted: 2018-12-18

## 2022-03-20 PROBLEM — I42.0 DILATED CARDIOMYOPATHY (HCC): Status: ACTIVE | Noted: 2018-12-28

## 2022-03-22 ENCOUNTER — HOSPITAL ENCOUNTER (OUTPATIENT)
Dept: CT IMAGING | Age: 67
Discharge: HOME | End: 2022-03-22
Attending: NURSE PRACTITIONER
Payer: MEDICARE

## 2022-03-22 DIAGNOSIS — N13.30 HYDRONEPHROSIS, UNSPECIFIED HYDRONEPHROSIS TYPE: ICD-10-CM

## 2022-03-22 PROCEDURE — 74176 CT ABD & PELVIS W/O CONTRAST: CPT

## 2022-03-23 NOTE — PROGRESS NOTES
CT showed obstruction of left uteropelvic junction as cause of the hydronephrosis. I see you already have an appointment scheduled with urology today for further evaluation; great! Spoke to patient, message above was relayed and understanding expressed.

## 2022-03-23 NOTE — PROGRESS NOTES
CT showed obstruction of left uteropelvic junction as cause of the hydronephrosis. I see you already have an appointment scheduled with urology today for further evaluation; great!

## 2022-04-04 ENCOUNTER — HOSPITAL ENCOUNTER (OUTPATIENT)
Dept: NUCLEAR MEDICINE | Age: 67
Discharge: HOME | End: 2022-04-04
Attending: NURSE PRACTITIONER
Payer: MEDICARE

## 2022-04-04 DIAGNOSIS — N13.5 OBSTRUCTION OF LEFT URETEROPELVIC JUNCTION (UPJ): ICD-10-CM

## 2022-04-04 PROCEDURE — 78708 K FLOW/FUNCT IMAGE W/DRUG: CPT

## 2022-04-04 PROCEDURE — 74011250636 HC RX REV CODE- 250/636: Performed by: NURSE PRACTITIONER

## 2022-04-04 RX ORDER — FUROSEMIDE 10 MG/ML
40 INJECTION INTRAMUSCULAR; INTRAVENOUS ONCE
Status: COMPLETED | OUTPATIENT
Start: 2022-04-04 | End: 2022-04-04

## 2022-04-04 RX ADMIN — FUROSEMIDE 40 MG: 10 INJECTION, SOLUTION INTRAMUSCULAR; INTRAVENOUS at 09:22

## 2022-04-05 NOTE — PROGRESS NOTES
Discussed MAG 3 with patient. Will see Dr Kaelyn Pelaez to discuss further. Apt made.     Fransisca Metcalf, CARLINP

## 2022-05-24 LAB
ALBUMIN SERPL-MCNC: 4 G/DL (ref 3.2–4.6)
ALBUMIN/GLOB SERPL: 1.3 {RATIO} (ref 1.2–3.5)
ALP SERPL-CCNC: 62 U/L (ref 50–136)
ALT SERPL-CCNC: 24 U/L (ref 12–65)
ANION GAP SERPL CALC-SCNC: 3 MMOL/L (ref 7–16)
AST SERPL-CCNC: 20 U/L (ref 15–37)
BILIRUB SERPL-MCNC: 0.4 MG/DL (ref 0.2–1.1)
BUN SERPL-MCNC: 14 MG/DL (ref 8–23)
CALCIUM SERPL-MCNC: 9.2 MG/DL (ref 8.3–10.4)
CHLORIDE SERPL-SCNC: 107 MMOL/L (ref 98–107)
CHOLEST SERPL-MCNC: 152 MG/DL
CO2 SERPL-SCNC: 30 MMOL/L (ref 21–32)
CREAT SERPL-MCNC: 0.7 MG/DL (ref 0.6–1)
GLOBULIN SER CALC-MCNC: 3.1 G/DL (ref 2.3–3.5)
GLUCOSE SERPL-MCNC: 89 MG/DL (ref 65–100)
HDLC SERPL-MCNC: 57 MG/DL (ref 40–60)
HDLC SERPL: 2.7 {RATIO}
LDLC SERPL CALC-MCNC: 70.2 MG/DL
POTASSIUM SERPL-SCNC: 4.3 MMOL/L (ref 3.5–5.1)
PROT SERPL-MCNC: 7.1 G/DL (ref 6.3–8.2)
SODIUM SERPL-SCNC: 140 MMOL/L (ref 136–145)
TRIGL SERPL-MCNC: 124 MG/DL (ref 35–150)
VLDLC SERPL CALC-MCNC: 24.8 MG/DL (ref 6–23)

## 2022-05-31 ENCOUNTER — NURSE ONLY (OUTPATIENT)
Dept: CARDIOLOGY CLINIC | Age: 67
End: 2022-05-31
Payer: MEDICARE

## 2022-05-31 ENCOUNTER — OFFICE VISIT (OUTPATIENT)
Dept: CARDIOLOGY CLINIC | Age: 67
End: 2022-05-31
Payer: MEDICARE

## 2022-05-31 VITALS
HEIGHT: 66 IN | WEIGHT: 140 LBS | BODY MASS INDEX: 22.5 KG/M2 | DIASTOLIC BLOOD PRESSURE: 80 MMHG | HEART RATE: 69 BPM | SYSTOLIC BLOOD PRESSURE: 124 MMHG

## 2022-05-31 DIAGNOSIS — I42.0 DILATED CARDIOMYOPATHY (HCC): ICD-10-CM

## 2022-05-31 DIAGNOSIS — Z95.1 S/P CABG X 3: ICD-10-CM

## 2022-05-31 DIAGNOSIS — Z95.810 S/P IMPLANTATION OF AUTOMATIC CARDIOVERTER/DEFIBRILLATOR (AICD): ICD-10-CM

## 2022-05-31 DIAGNOSIS — I44.7 LBBB (LEFT BUNDLE BRANCH BLOCK): Primary | ICD-10-CM

## 2022-05-31 DIAGNOSIS — I25.10 CORONARY ARTERY DISEASE INVOLVING NATIVE CORONARY ARTERY OF NATIVE HEART WITHOUT ANGINA PECTORIS: ICD-10-CM

## 2022-05-31 DIAGNOSIS — I50.22 CHRONIC SYSTOLIC CONGESTIVE HEART FAILURE (HCC): ICD-10-CM

## 2022-05-31 PROCEDURE — G8427 DOCREV CUR MEDS BY ELIG CLIN: HCPCS | Performed by: INTERNAL MEDICINE

## 2022-05-31 PROCEDURE — 93289 INTERROG DEVICE EVAL HEART: CPT | Performed by: INTERNAL MEDICINE

## 2022-05-31 PROCEDURE — 1090F PRES/ABSN URINE INCON ASSESS: CPT | Performed by: INTERNAL MEDICINE

## 2022-05-31 PROCEDURE — G8420 CALC BMI NORM PARAMETERS: HCPCS | Performed by: INTERNAL MEDICINE

## 2022-05-31 PROCEDURE — 93000 ELECTROCARDIOGRAM COMPLETE: CPT | Performed by: INTERNAL MEDICINE

## 2022-05-31 PROCEDURE — G8399 PT W/DXA RESULTS DOCUMENT: HCPCS | Performed by: INTERNAL MEDICINE

## 2022-05-31 PROCEDURE — 99214 OFFICE O/P EST MOD 30 MIN: CPT | Performed by: INTERNAL MEDICINE

## 2022-05-31 PROCEDURE — 3017F COLORECTAL CA SCREEN DOC REV: CPT | Performed by: INTERNAL MEDICINE

## 2022-05-31 PROCEDURE — 1123F ACP DISCUSS/DSCN MKR DOCD: CPT | Performed by: INTERNAL MEDICINE

## 2022-05-31 PROCEDURE — 1036F TOBACCO NON-USER: CPT | Performed by: INTERNAL MEDICINE

## 2022-05-31 NOTE — PROGRESS NOTES
This note will not be viewable in [address] IN OFFICE CHECK    Preliminary Impression: All normal function. No changes were made. BiV pacing 99%. Following MD: Dr. Kyra Mariee  Implanting MD: Dr. Hemal Swan presented to the [address] [place] [address] Drive today for a device check. Brief non-sustained oversensing events. Underlying SR. Battery and leads stable. No changes made. Normal CRT-D function. The device was interrogated, and the results were forwarded to the ordering provider for review.

## 2022-05-31 NOTE — PROGRESS NOTES
[address], [address] [employer] [address]  PHONE: [phone]     22    NAME:  Janalee Goltz  : 1955  MRN: [id number]       SUBJECTIVE:   Janalee Goltz is a 74 y.o. female seen for a follow up visit regarding the following:     Chief Complaint   Patient presents with    Coronary Artery Disease    Hypertension       HPI:      18.:  CABG X 3 with LIMA to LAD, sequential SVG to Ramus and OM. EF 30-35% before the CABG. Echo 2019: EF 35-40%. [facility] biventricular ICD on 18. Echo 10/2019: EF 50%     On praluent now, doing well, LDL dropped from 121 to 70. No new angina, CP, pressure, LARIOS, SOB. Active, working. Patient denies recent history of orthopnea, PND, excessive dizziness and/or syncope. Off lipitor. Past Medical History, Past Surgical History, Family history, Social History, and Medications were all reviewed with the patient today and updated as necessary. Current Outpatient Medications   Medication Sig Dispense Refill    Alirocumab (PRALUENT) 150 MG/ML SOAJ Inject 150 mg into the skin      ALPRAZolam (XANAX) 0.25 MG tablet Take one tablet 45 minutes prior to procedure Indications: claustrophobia      aspirin 81 MG EC tablet Take 81 mg by mouth      carvedilol (COREG) 6.25 MG tablet Take 6.25 mg by mouth 2 times daily (with meals)      estradiol (ESTRACE) 0.1 MG/GM vaginal cream Apply 1 gm vaginally nightly x 2 weeks then reduce to 2x/week      gabapentin (NEURONTIN) 100 MG capsule Take 100 mg by mouth.  As needed      Meth-Hyo-M Bl-Na Phos-Ph Sal (URIBEL) 118 MG CAPS Take 1 capsule by mouth 4 times daily As needed      nitroGLYCERIN (NITROSTAT) 0.4 MG SL tablet Place 0.4 mg under the tongue      ondansetron (ZOFRAN-ODT) 8 MG TBDP disintegrating tablet Take 8 mg by mouth every 8 hours as needed      pantoprazole (PROTONIX) 40 MG tablet TAKE 1 TABLET DAILY      mirabegron (MYRBETRIQ) 25 MG TB24 Take 25 mg by mouth daily (Patient not taking: Reported on 2022)       No current facility-administered medications for this visit.         Allergies   Allergen Reactions    Codeine Nausea Only    Doxycycline Nausea Only    Conjugated Estrogens Palpitations     Patient does not know why this is on the list    Levofloxacin Nausea And Vomiting    Penicillins Rash    Sulfa Antibiotics Rash     Patient Active Problem List    Diagnosis Date Noted    Overactive bladder     Multiple thyroid nodules     Interstitial cystitis 07/15/2019    S/P CABG x 3     S/P implantation of automatic cardioverter/defibrillator (AICD)     Dilated cardiomyopathy ([facility] [address]     Systolic congestive heart failure ([facility] [address] 2018    CAD (coronary artery disease) 2018    Dyslipidemia     LBBB (left bundle branch block) 10/31/2018    Osteopenia     SCCA (squamous cell carcinoma) of skin     BCC (basal cell carcinoma of skin)     Anxiety     Chronic insomnia     IBS (irritable bowel syndrome)     Fibromyalgia 2012    GERD (gastroesophageal reflux disease) 2012      Past Surgical History:   Procedure Laterality Date    BREAST BIOPSY Left     BREAST SURGERY Bilateral 1988    CARDIAC CATHETERIZATION  2018     SECTION      COLONOSCOPY      WNL    CORONARY ARTERY BYPASS GRAFT  2018    CABG X 3    PACEMAKER PLACEMENT  2018    and defibrillator    AYSE AND BSO      TONSILLECTOMY AND ADENOIDECTOMY  as a child     Family History   Problem Relation Age of Onset    Thyroid Disease Neg Hx     Thyroid Cancer Neg Hx     Breast Cancer Paternal Grandmother 67    Cancer Father         larnygeal, non smoker    Osteoarthritis Mother     Coronary Art Dis Mother         CABG, [place]     Social History     Tobacco Use    Smoking status: Former Smoker     Packs/day: 15.00     Quit date: 3/1/1984     Years since quittin.2    Smokeless tobacco: Never Used   Substance Use Topics    Alcohol use: No         ROS:    No obvious pertinent positives on review of systems except for what was outlined in the HPI today.       PHYSICAL EXAM:     /80   Pulse 69   Ht 5' 6\" (1.676 m)   Wt 140 lb (63.5 kg) Comment: with shoes  BMI 22.60 kg/m²    General/Constitutional:   Alert and oriented x 3, no acute distress  HEENT:   normocephalic, atraumatic, moist mucous membranes  Neck:   No JVD or carotid bruits bilaterally  Cardiovascular:   regular rate and rhythm, no murmur/rub/gallop appreciated  Pulmonary:   clear to auscultation bilaterally, no respiratory distress  Abdomen:   soft, non-tender, non-distended  Ext:   No sig LE edema bilaterally  Skin:  warm and dry, no obvious rashes seen  Neuro:   no obvious sensory or motor deficits  Psychiatric:   normal mood and affect      EKG Today and independently reviewed by me: paced      Lab Results   Component Value Date     05/24/2022    K 4.3 05/24/2022     05/24/2022    CO2 30 05/24/2022    BUN 14 05/24/2022    CREATININE 0.70 05/24/2022    GLUCOSE 89 05/24/2022    CALCIUM 9.2 05/24/2022        Lab Results   Component Value Date    WBC 4.5 03/03/2022    HGB 13.5 03/03/2022    HCT 41.1 03/03/2022    MCV 93 03/03/2022     03/03/2022       Lab Results   Component Value Date    TSH 1.260 03/03/2022       No results found for: LABA1C  No results found for: EAG    Lab Results   Component Value Date    CHOL 152 05/24/2022    CHOL 197 01/21/2022    CHOL 181 09/24/2021     Lab Results   Component Value Date    TRIG 124 05/24/2022    TRIG 169 (H) 01/21/2022    TRIG 142 09/24/2021     Lab Results   Component Value Date    HDL 57 05/24/2022    HDL 42 01/21/2022    HDL 42 09/24/2021     Lab Results   Component Value Date    LDLCALC 70.2 05/24/2022    LDLCALC 121.2 (H) 01/21/2022    LDLCALC 110.6 (H) 09/24/2021     Lab Results   Component Value Date    LABVLDL 24.8 (H) 05/24/2022    LABVLDL 33.8 (H) 01/21/2022    LABVLDL 28.4 (H) 09/24/2021    VLDL 25 05/18/2021    VLDL 28 01/12/2021     Lab Results   Component Value Date    CHOLHDLRATIO 2.7 05/24/2022    CHOLHDLRATIO 4.7 01/21/2022    CHOLHDLRATIO 4.3 09/24/2021           I have Independently reviewed prior care notes, any ER records available, cardiac testing, labs and results with the patient and before seeing the patient today. Also independently reviewed outside records when available. ASSESSMENT:    Luis Nj was seen today for coronary artery disease and hypertension. Diagnoses and all orders for this visit:    LBBB (left bundle branch block)  -     EKG 12 Lead    CAD (coronary artery disease)  -     EKG 12 Lead    S/P implantation of automatic cardioverter/defibrillator (AICD)    Chronic systolic congestive heart failure (HCC)    Dilated cardiomyopathy (HCC)    S/P CABG x 3          PLAN:      1.  HPL:  on praluent now. 2. CAD s/p CABG:  doing well and better now. Remain on ASA and praluent now. The patient has been instructed to call with any angina or equivalent as reviewed today. All questions were answered with the patient voicing complete understanding. 3. SHF: remain on coreg, no Ace with low BP in the past. Follow, EF better now. 4. BiV-ICD: last remote, All normal function. BiV pacing 97%. 5. HTN: white coat HTN, lower at home. Follow. Patient has been instructed and agrees to call our office with any issues or other concerns related to their cardiac condition(s) and/or complaint(s). Return in about 6 months (around 11/30/2022).        VIRGILIO LAN DO  5/31/2022

## 2022-06-06 NOTE — TELEPHONE ENCOUNTER
Spoke to patient. She has been taking ibuprofen 3 times daily as prescribed by MD and is feeling some better. She will see Dr. Perla Castillo on Wednesday 1/30/19 and hopes to return to cardiac rehab on Friday 2/1/19. [FreeTextEntry1] : check labs\par ecg

## 2022-09-01 ENCOUNTER — OFFICE VISIT (OUTPATIENT)
Dept: INTERNAL MEDICINE CLINIC | Facility: CLINIC | Age: 67
End: 2022-09-01
Payer: MEDICARE

## 2022-09-01 VITALS
DIASTOLIC BLOOD PRESSURE: 73 MMHG | HEART RATE: 60 BPM | SYSTOLIC BLOOD PRESSURE: 124 MMHG | OXYGEN SATURATION: 98 % | TEMPERATURE: 97.2 F | HEIGHT: 66 IN | BODY MASS INDEX: 22.98 KG/M2 | WEIGHT: 143 LBS

## 2022-09-01 DIAGNOSIS — K21.9 GASTROESOPHAGEAL REFLUX DISEASE, UNSPECIFIED WHETHER ESOPHAGITIS PRESENT: ICD-10-CM

## 2022-09-01 DIAGNOSIS — I42.0 DILATED CARDIOMYOPATHY (HCC): Chronic | ICD-10-CM

## 2022-09-01 DIAGNOSIS — I25.10 CORONARY ARTERY DISEASE INVOLVING NATIVE CORONARY ARTERY OF NATIVE HEART WITHOUT ANGINA PECTORIS: Chronic | ICD-10-CM

## 2022-09-01 DIAGNOSIS — I50.22 CHRONIC SYSTOLIC CONGESTIVE HEART FAILURE (HCC): Chronic | ICD-10-CM

## 2022-09-01 DIAGNOSIS — F32.A ANXIETY AND DEPRESSION: Primary | ICD-10-CM

## 2022-09-01 DIAGNOSIS — E78.5 DYSLIPIDEMIA: ICD-10-CM

## 2022-09-01 DIAGNOSIS — F41.9 ANXIETY AND DEPRESSION: Primary | ICD-10-CM

## 2022-09-01 DIAGNOSIS — N30.10 INTERSTITIAL CYSTITIS: ICD-10-CM

## 2022-09-01 DIAGNOSIS — M85.89 OSTEOPENIA OF MULTIPLE SITES: ICD-10-CM

## 2022-09-01 DIAGNOSIS — N32.81 OVERACTIVE BLADDER: ICD-10-CM

## 2022-09-01 DIAGNOSIS — Z95.1 S/P CABG X 3: ICD-10-CM

## 2022-09-01 DIAGNOSIS — E04.2 MULTIPLE THYROID NODULES: ICD-10-CM

## 2022-09-01 DIAGNOSIS — Z91.09 ENVIRONMENTAL ALLERGIES: ICD-10-CM

## 2022-09-01 PROCEDURE — 99214 OFFICE O/P EST MOD 30 MIN: CPT | Performed by: NURSE PRACTITIONER

## 2022-09-01 PROCEDURE — G8427 DOCREV CUR MEDS BY ELIG CLIN: HCPCS | Performed by: NURSE PRACTITIONER

## 2022-09-01 PROCEDURE — G8399 PT W/DXA RESULTS DOCUMENT: HCPCS | Performed by: NURSE PRACTITIONER

## 2022-09-01 PROCEDURE — 1036F TOBACCO NON-USER: CPT | Performed by: NURSE PRACTITIONER

## 2022-09-01 PROCEDURE — 1090F PRES/ABSN URINE INCON ASSESS: CPT | Performed by: NURSE PRACTITIONER

## 2022-09-01 PROCEDURE — 3017F COLORECTAL CA SCREEN DOC REV: CPT | Performed by: NURSE PRACTITIONER

## 2022-09-01 PROCEDURE — 1123F ACP DISCUSS/DSCN MKR DOCD: CPT | Performed by: NURSE PRACTITIONER

## 2022-09-01 PROCEDURE — G8420 CALC BMI NORM PARAMETERS: HCPCS | Performed by: NURSE PRACTITIONER

## 2022-09-01 RX ORDER — ATORVASTATIN CALCIUM 20 MG/1
20 TABLET, FILM COATED ORAL DAILY
COMMUNITY
End: 2022-09-01

## 2022-09-01 RX ORDER — FEXOFENADINE HCL 180 MG/1
180 TABLET ORAL DAILY
Qty: 90 TABLET | Refills: 1 | Status: SHIPPED | OUTPATIENT
Start: 2022-09-01 | End: 2022-10-01

## 2022-09-01 RX ORDER — PANTOPRAZOLE SODIUM 40 MG/1
40 TABLET, DELAYED RELEASE ORAL DAILY
Qty: 90 TABLET | Refills: 1 | Status: SHIPPED | OUTPATIENT
Start: 2022-09-01

## 2022-09-01 ASSESSMENT — ENCOUNTER SYMPTOMS
WHEEZING: 0
COUGH: 0
SHORTNESS OF BREATH: 0

## 2022-09-01 ASSESSMENT — ANXIETY QUESTIONNAIRES
3. WORRYING TOO MUCH ABOUT DIFFERENT THINGS: 0
2. NOT BEING ABLE TO STOP OR CONTROL WORRYING: 0
IF YOU CHECKED OFF ANY PROBLEMS ON THIS QUESTIONNAIRE, HOW DIFFICULT HAVE THESE PROBLEMS MADE IT FOR YOU TO DO YOUR WORK, TAKE CARE OF THINGS AT HOME, OR GET ALONG WITH OTHER PEOPLE: NOT DIFFICULT AT ALL
GAD7 TOTAL SCORE: 0
4. TROUBLE RELAXING: 0
1. FEELING NERVOUS, ANXIOUS, OR ON EDGE: 0
6. BECOMING EASILY ANNOYED OR IRRITABLE: 0
5. BEING SO RESTLESS THAT IT IS HARD TO SIT STILL: 0
7. FEELING AFRAID AS IF SOMETHING AWFUL MIGHT HAPPEN: 0

## 2022-09-01 ASSESSMENT — PATIENT HEALTH QUESTIONNAIRE - PHQ9
SUM OF ALL RESPONSES TO PHQ QUESTIONS 1-9: 0
SUM OF ALL RESPONSES TO PHQ9 QUESTIONS 1 & 2: 0
1. LITTLE INTEREST OR PLEASURE IN DOING THINGS: 0
SUM OF ALL RESPONSES TO PHQ QUESTIONS 1-9: 0
2. FEELING DOWN, DEPRESSED OR HOPELESS: 0
SUM OF ALL RESPONSES TO PHQ QUESTIONS 1-9: 0
SUM OF ALL RESPONSES TO PHQ QUESTIONS 1-9: 0

## 2022-09-01 NOTE — PROGRESS NOTES
Cymbalta at this time. Would like to try Zoloft. Discussed risks/benefits, alternatives, potential side effects, proper administration of medication. She takes Uribel, but only very rarely for a few doses when she has a UTI. Notify office if no improvement in symptoms. Interstitial cystitis, overactive bladder--  Follows with urogynecology, Dr. Luis Roldan. Has done pelvic floor PT. Left [facility]--  Diagnosed in early 35s, opted for observation. Rediscovered after her fall earlier in 2022. Was evaluated by urology and elected for observation. To monitor renal function and report new flank pain. GERD--  Symptoms stable on pantoprazole 40mg daily. No red flag symptoms. Osteoporosis--  Received Reclast (around 2012 per patient report). Previously treated with Fosamax (caused GERD, was only on for several months) then changed to Actonel (also caused GERD, and was only on it for a few months). DEXA September 2015 showed improvement into osteopenia range. DEXA March 2021 showed loss of bone mineral density, still in osteopenia range, but worsened since last DEXA in 2015. FRAX indicates 10-year risk of a hip fracture is 1.9% and of any major osteoporotic fracture is 11%. She suffered sacral insufficiency fracture after a fall in October 2021. Vitamin D was 49.6 in March 2022. --Advise calcium and vitamin D supplements, weight bearing exercise to strengthen bones, and take precautions to avoid falls. Educational handouts given. --We previously discussed trying another medication for her osteoporosis, such as Prolia. She expresses understanding of risks and benefits, but declines at this time. Will let me know if she reconsiders. Needs Rx for Allegra for her allergies.        Health Maintenance:  *Medicare Wellness: 3/3/22   *Colorectal cancer screening: colonoscopy 2014, 10 year follow up   *Mammogram: June 2020, no evidence of malignancy   *DEXA: March 2021   *TDAP: declines   *Pneumovax: declines   *Prevnar: declines   *Shingrix: declines   *Covid19: 4/10/21   *Flu: Declined despite counseling that a yearly flu shot is recommended to lower risk for morbidity/mortality from influenza. We discussed the risks and benefits of these immunizations. Patient expresses understanding, and declines. History:   Allergies   Allergen Reactions    Codeine Nausea Only    Doxycycline Nausea Only    Conjugated Estrogens Palpitations     Patient does not know why this is on the list    Levofloxacin Nausea And Vomiting    Penicillins Rash    Sulfa Antibiotics Rash       Past Medical History:   Diagnosis Date    Anxiety disorder     Basal cell carcinoma of skin     Chronic insomnia     Coronary artery disease     COVID-19 2020    mild case     Dyslipidemia     Fibromyalgia     Gastroesophageal reflux disease     Interstitial cystitis     Irritable bowel syndrome     Ischemic colitis ([facility] [address]     Multiple thyroid nodules     S/P thyroid bx 2019-benign    Osteopenia     Overactive bladder     Squamous cell carcinoma of skin     Systolic congestive heart failure [facility])        Past Surgical History:   Procedure Laterality Date    BREAST BIOPSY Left     BREAST SURGERY Bilateral     CARDIAC CATHETERIZATION  2018     SECTION  1984    COLONOSCOPY      WNL    CORONARY ARTERY BYPASS GRAFT  2018    CABG X 3    PACEMAKER PLACEMENT  2018    and defibrillator    AYSE AND BSO (CERVIX REMOVED)  [address] [facility],[address]  as a child       Family History   Problem Relation Age of Onset    Thyroid Disease Neg Hx     Thyroid Cancer Neg Hx     Breast Cancer Paternal Grandmother 67    Cancer Father         [address], non smoker    Osteoarthritis Mother     Coronary Art Dis Mother         CABG, [place]       Social History     Tobacco Use   Smoking Status Former    Packs/day: 15.00    Types: Cigarettes    Quit date: 3/1/1984    Years since quittin.5   Smokeless Tobacco Never Social History     Substance and Sexual Activity   Alcohol Use No       Current Outpatient Medications   Medication Sig Dispense Refill    pantoprazole (PROTONIX) 40 MG tablet Take 1 tablet by mouth daily TAKE 1 TABLET DAILY 90 tablet 1    sertraline (ZOLOFT) 50 MG tablet Take 1 tablet by mouth daily 30 tablet 2    fexofenadine (ALLEGRA) 180 MG tablet Take 1 tablet by mouth daily 90 tablet 1    Alirocumab (PRALUENT) 150 MG/ML SOAJ Inject 150 mg into the skin      aspirin 81 MG EC tablet Take 81 mg by mouth      carvedilol (COREG) 6.25 MG tablet Take 6.25 mg by mouth 2 times daily (with meals)      estradiol (ESTRACE) 0.1 MG/GM vaginal cream Apply 1 gm vaginally nightly x 2 weeks then reduce to 2x/week      gabapentin (NEURONTIN) 100 MG capsule Take 100 mg by mouth. As needed      Meth-Hyo-M Bl-Na Phos-Ph Sal (URIBEL) 118 MG CAPS Take 1 capsule by mouth 4 times daily As needed      mirabegron (MYRBETRIQ) 25 MG TB24 Take 25 mg by mouth daily      nitroGLYCERIN (NITROSTAT) 0.4 MG SL tablet Place 0.4 mg under the tongue      ondansetron (ZOFRAN-ODT) 8 MG TBDP disintegrating tablet Take 8 mg by mouth every 8 hours as needed       No current facility-administered medications for this visit. Review of Systems:  Review of Systems   Constitutional:  Negative for activity change, appetite change, fever and unexpected weight change. HENT:  Negative for congestion. Respiratory:  Negative for cough, shortness of breath and wheezing. Cardiovascular:  Negative for chest pain, palpitations and leg swelling. Skin:  Negative for pallor and rash. Neurological:  Negative for dizziness, seizures, syncope, weakness and headaches. Psychiatric/Behavioral:  Positive for dysphoric mood and sleep disturbance. The patient is nervous/anxious. See HPI for other pertinent positives and negatives.      Objective:  Vitals:    09/01/22 0931   BP: 124/73   Site: Right Upper Arm   Position: Sitting   Cuff Size: pleasure in doing things -   Feeling down, depressed, or hopeless 0   PHQ-2 Score 0   Total Score PHQ 2 -   PHQ-9 Total Score 0        Assessment/Plan:      Health Maintenance Due   Topic Date Due    Pneumococcal 65+ years Vaccine (1 - PCV) Never done    DTaP/Tdap/Td vaccine (1 - Tdap) Never done    Shingles vaccine (1 of 2) Never done    COVID-19 Vaccine (2 - Booster for Oswaldo series) 06/05/2021    Flu vaccine (1) Never done          Tova Morgan was seen today for coronary artery disease. Diagnoses and all orders for this visit:    Anxiety and depression  -     sertraline (ZOLOFT) 50 MG tablet; Take 1 tablet by mouth daily    Chronic systolic congestive heart failure (HCC)  Comments:  follows with cardiology     Dilated cardiomyopathy ([facility] [address]  Comments:  follows with cardiology     Coronary artery disease involving native coronary artery of native heart without angina pectoris  Comments:  follows with cardiology     Dyslipidemia    S/P CABG x 3    Multiple thyroid nodules  Comments:  follows with endocrinology     Interstitial cystitis  Comments:  follows with urology     Overactive bladder  Comments:  follows with urology     Osteopenia of multiple sites    Gastroesophageal reflux disease, unspecified whether esophagitis present  Comments:  stable   Orders:  -     pantoprazole (PROTONIX) 40 MG tablet; Take 1 tablet by mouth daily TAKE 1 TABLET DAILY    Environmental allergies  -     fexofenadine (ALLEGRA) 180 MG tablet; Take 1 tablet by mouth daily      Patient states she is otherwise doing well; has no further questions or concerns at this visit. Encouraged to contact office with any concerns prior to next visit.      Return in about 6 months (around 3/1/2023), or if symptoms worsen or fail to improve, for Follow up, Fasting labs, [address], APRN - CNP

## 2022-09-12 RX ORDER — CARVEDILOL 6.25 MG/1
TABLET ORAL
Qty: 180 TABLET | Refills: 3 | Status: SHIPPED | OUTPATIENT
Start: 2022-09-12

## 2022-10-07 ENCOUNTER — HOSPITAL ENCOUNTER (OUTPATIENT)
Dept: MAMMOGRAPHY | Age: 67
Discharge: HOME | End: 2022-10-10
Payer: MEDICARE

## 2022-10-07 DIAGNOSIS — Z12.31 OTHER SCREENING MAMMOGRAM: ICD-10-CM

## 2022-10-07 PROCEDURE — 77063 BREAST TOMOSYNTHESIS BI: CPT

## 2022-10-11 DIAGNOSIS — I44.7 LBBB (LEFT BUNDLE BRANCH BLOCK): ICD-10-CM

## 2022-10-11 DIAGNOSIS — Z95.810 S/P IMPLANTATION OF AUTOMATIC CARDIOVERTER/DEFIBRILLATOR (AICD): Primary | ICD-10-CM

## 2022-10-11 DIAGNOSIS — I50.20 SYSTOLIC CONGESTIVE HEART FAILURE (HCC): ICD-10-CM

## 2022-11-28 DIAGNOSIS — I50.20 SYSTOLIC CONGESTIVE HEART FAILURE (HCC): ICD-10-CM

## 2022-11-28 DIAGNOSIS — I25.10 CORONARY ARTERY DISEASE INVOLVING NATIVE CORONARY ARTERY OF NATIVE HEART WITHOUT ANGINA PECTORIS: ICD-10-CM

## 2022-11-28 DIAGNOSIS — I50.22 CHRONIC SYSTOLIC CONGESTIVE HEART FAILURE (HCC): ICD-10-CM

## 2022-11-28 DIAGNOSIS — Z95.810 S/P IMPLANTATION OF AUTOMATIC CARDIOVERTER/DEFIBRILLATOR (AICD): Primary | ICD-10-CM

## 2022-11-28 DIAGNOSIS — Z95.810 S/P IMPLANTATION OF AUTOMATIC CARDIOVERTER/DEFIBRILLATOR (AICD): ICD-10-CM

## 2022-11-28 LAB
ALBUMIN SERPL-MCNC: 3.7 G/DL (ref 3.2–4.6)
ALBUMIN/GLOB SERPL: 1.2 {RATIO} (ref 0.4–1.6)
ALP SERPL-CCNC: 54 U/L (ref 50–136)
ALT SERPL-CCNC: 28 U/L (ref 12–65)
ANION GAP SERPL CALC-SCNC: 4 MMOL/L (ref 2–11)
AST SERPL-CCNC: 20 U/L (ref 15–37)
BILIRUB SERPL-MCNC: 0.3 MG/DL (ref 0.2–1.1)
BUN SERPL-MCNC: 15 MG/DL (ref 8–23)
CALCIUM SERPL-MCNC: 9.2 MG/DL (ref 8.3–10.4)
CHLORIDE SERPL-SCNC: 110 MMOL/L (ref 101–110)
CHOLEST SERPL-MCNC: 156 MG/DL
CO2 SERPL-SCNC: 29 MMOL/L (ref 21–32)
CREAT SERPL-MCNC: 0.7 MG/DL (ref 0.6–1)
GLOBULIN SER CALC-MCNC: 3 G/DL (ref 2.8–4.5)
GLUCOSE SERPL-MCNC: 96 MG/DL (ref 65–100)
HDLC SERPL-MCNC: 49 MG/DL (ref 40–60)
HDLC SERPL: 3.2 {RATIO}
LDLC SERPL CALC-MCNC: 71.6 MG/DL
POTASSIUM SERPL-SCNC: 4.7 MMOL/L (ref 3.5–5.1)
PROT SERPL-MCNC: 6.7 G/DL (ref 6.3–8.2)
SODIUM SERPL-SCNC: 143 MMOL/L (ref 133–143)
TRIGL SERPL-MCNC: 177 MG/DL (ref 35–150)
VLDLC SERPL CALC-MCNC: 35.4 MG/DL (ref 6–23)

## 2022-12-05 ENCOUNTER — OFFICE VISIT (OUTPATIENT)
Dept: CARDIOLOGY CLINIC | Age: 67
End: 2022-12-05
Payer: MEDICARE

## 2022-12-05 VITALS
HEART RATE: 84 BPM | DIASTOLIC BLOOD PRESSURE: 60 MMHG | SYSTOLIC BLOOD PRESSURE: 102 MMHG | BODY MASS INDEX: 22.98 KG/M2 | WEIGHT: 143 LBS | HEIGHT: 66 IN

## 2022-12-05 DIAGNOSIS — Z95.810 S/P IMPLANTATION OF AUTOMATIC CARDIOVERTER/DEFIBRILLATOR (AICD): ICD-10-CM

## 2022-12-05 DIAGNOSIS — I25.10 CORONARY ARTERY DISEASE INVOLVING NATIVE CORONARY ARTERY OF NATIVE HEART WITHOUT ANGINA PECTORIS: ICD-10-CM

## 2022-12-05 DIAGNOSIS — I50.22 CHRONIC SYSTOLIC CONGESTIVE HEART FAILURE (HCC): ICD-10-CM

## 2022-12-05 DIAGNOSIS — I42.0 DILATED CARDIOMYOPATHY (HCC): Primary | ICD-10-CM

## 2022-12-05 PROCEDURE — 1123F ACP DISCUSS/DSCN MKR DOCD: CPT | Performed by: INTERNAL MEDICINE

## 2022-12-05 PROCEDURE — 1036F TOBACCO NON-USER: CPT | Performed by: INTERNAL MEDICINE

## 2022-12-05 PROCEDURE — G8484 FLU IMMUNIZE NO ADMIN: HCPCS | Performed by: INTERNAL MEDICINE

## 2022-12-05 PROCEDURE — 99214 OFFICE O/P EST MOD 30 MIN: CPT | Performed by: INTERNAL MEDICINE

## 2022-12-05 PROCEDURE — 1090F PRES/ABSN URINE INCON ASSESS: CPT | Performed by: INTERNAL MEDICINE

## 2022-12-05 PROCEDURE — G8399 PT W/DXA RESULTS DOCUMENT: HCPCS | Performed by: INTERNAL MEDICINE

## 2022-12-05 PROCEDURE — 3017F COLORECTAL CA SCREEN DOC REV: CPT | Performed by: INTERNAL MEDICINE

## 2022-12-05 PROCEDURE — G8420 CALC BMI NORM PARAMETERS: HCPCS | Performed by: INTERNAL MEDICINE

## 2022-12-05 PROCEDURE — G8427 DOCREV CUR MEDS BY ELIG CLIN: HCPCS | Performed by: INTERNAL MEDICINE

## 2022-12-05 RX ORDER — ALIROCUMAB 150 MG/ML
150 INJECTION, SOLUTION SUBCUTANEOUS
Qty: 6 ADJUSTABLE DOSE PRE-FILLED PEN SYRINGE | Refills: 3 | Status: SHIPPED | OUTPATIENT
Start: 2022-12-05

## 2022-12-05 NOTE — PROGRESS NOTES
[address], [address]  PHONE: [phone]     22    NAME:  Fiorella Calles  : 1955  MRN: [id number]       SUBJECTIVE:   Fiorella Calles is a 69 y.o. female seen for a follow up visit regarding the following:     Chief Complaint   Patient presents with    Coronary Artery Disease       HPI:      18:  CABG X 3 with LIMA to LAD, sequential SVG to Ramus and OM. EF 30-35% before the CABG. Echo 2019: EF 35-40%. [facility] biventricular ICD on 18. Echo 10/2019: EF 50%     Walking 30 min daily, no angina. No CP, LARISO, SOB, edema. Patient denies recent history of orthopnea, PND, excessive dizziness and/or syncope. Off lipitor. Past Medical History, Past Surgical History, Family history, Social History, and Medications were all reviewed with the patient today and updated as necessary. Current Outpatient Medications   Medication Sig Dispense Refill    Alirocumab (PRALUENT) 150 MG/ML SOAJ Inject 150 mg into the skin every 14 days 6 Adjustable Dose Pre-filled Pen Syringe 3    carvedilol (COREG) 6.25 MG tablet TAKE 1 TABLET TWICE DAILY  WITH MEALS 180 tablet 3    pantoprazole (PROTONIX) 40 MG tablet Take 1 tablet by mouth daily TAKE 1 TABLET DAILY 90 tablet 1    sertraline (ZOLOFT) 50 MG tablet Take 1 tablet by mouth daily 30 tablet 2    aspirin 81 MG EC tablet Take 81 mg by mouth      estradiol (ESTRACE) 0.1 MG/GM vaginal cream Apply 1 gm vaginally nightly x 2 weeks then reduce to 2x/week      gabapentin (NEURONTIN) 100 MG capsule Take 100 mg by mouth.  As needed      Meth-Hyo-M Bl-Na Phos-Ph Sal (URIBEL) 118 MG CAPS Take 1 capsule by mouth 4 times daily As needed      mirabegron (MYRBETRIQ) 25 MG TB24 Take 25 mg by mouth daily      nitroGLYCERIN (NITROSTAT) 0.4 MG SL tablet Place 0.4 mg under the tongue      ondansetron (ZOFRAN-ODT) 8 MG TBDP disintegrating tablet Take 8 mg by mouth every 8 hours as needed       No current except for what was outlined in the HPI today.       PHYSICAL EXAM:     /60   Pulse 84   Ht 5' 6\" (1.676 m)   Wt 143 lb (64.9 kg)   BMI 23.08 kg/m²    General/Constitutional:   Alert and oriented x 3, no acute distress  HEENT:   normocephalic, atraumatic, moist mucous membranes  Neck:   No JVD or carotid bruits bilaterally  Cardiovascular:   regular rate and rhythm, no murmur/rub/gallop appreciated  Pulmonary:   clear to auscultation bilaterally, no respiratory distress  Abdomen:   soft, non-tender, non-distended  Ext:   No sig LE edema bilaterally  Skin:  warm and dry, no obvious rashes seen  Neuro:   no obvious sensory or motor deficits  Psychiatric:   normal mood and affect      Lab Results   Component Value Date/Time     11/28/2022 09:36 AM    K 4.7 11/28/2022 09:36 AM     11/28/2022 09:36 AM    CO2 29 11/28/2022 09:36 AM    BUN 15 11/28/2022 09:36 AM    CREATININE 0.70 11/28/2022 09:36 AM    GLUCOSE 96 11/28/2022 09:36 AM    CALCIUM 9.2 11/28/2022 09:36 AM        Lab Results   Component Value Date    WBC 4.5 03/03/2022    HGB 13.5 03/03/2022    HCT 41.1 03/03/2022    MCV 93 03/03/2022     03/03/2022       Lab Results   Component Value Date    TSH 1.260 03/03/2022       No results found for: LABA1C  No results found for: EAG    Lab Results   Component Value Date    CHOL 156 11/28/2022    CHOL 152 05/24/2022    CHOL 197 01/21/2022     Lab Results   Component Value Date    TRIG 177 (H) 11/28/2022    TRIG 124 05/24/2022    TRIG 169 (H) 01/21/2022     Lab Results   Component Value Date    HDL 49 11/28/2022    HDL 57 05/24/2022    HDL 42 01/21/2022     Lab Results   Component Value Date    LDLCALC 71.6 11/28/2022    LDLCALC 70.2 05/24/2022    LDLCALC 121.2 (H) 01/21/2022     Lab Results   Component Value Date    LABVLDL 35.4 (H) 11/28/2022    LABVLDL 24.8 (H) 05/24/2022    LABVLDL 33.8 (H) 01/21/2022    VLDL 25 05/18/2021    VLDL 28 01/12/2021     Lab Results   Component Value Date CHOLHDLRATIO 3.2 11/28/2022    CHOLHDLRATIO 2.7 05/24/2022    CHOLHDLRATIO 4.7 01/21/2022           I have Independently reviewed prior care notes, any ER records available, cardiac testing, labs and results with the patient and before seeing the patient today. Also independently reviewed outside records when available. ASSESSMENT:    [facility] - BARRETT YE was seen today for coronary artery disease. Diagnoses and all orders for this visit:    Dilated cardiomyopathy ([facility] [address]    Coronary artery disease involving native coronary artery of native heart without angina pectoris    S/P implantation of automatic cardioverter/defibrillator (AICD)    Chronic systolic congestive heart failure ([facility] [address]    Other orders  -     Alirocumab (PRALUENT) 150 MG/ML SOAJ; Inject 150 mg into the skin every 14 days        PLAN:      1.  HPL:  on praluent now.  labs better, follow trig. 2. CAD s/p CABG:  doing well and better now. Remain on ASA and praluent now. on coreg now. The patient has been instructed to call with any angina or equivalent as reviewed today. All questions were answered with the patient voicing complete understanding. 3. SHF: remain on coreg, no Ace with low BP in the past. Follow, EF better now. Echo in 1-2 yrs now. 4. BiV-ICD: last remote, BiV pacing well, follow in office      5. HTN: white coat HTN, lower at home. Follow. Patient has been instructed and agrees to call our office with any issues or other concerns related to their cardiac condition(s) and/or complaint(s). Return in about 6 months (around 6/5/2023).        VIRGILIO LAN DO  12/5/2022

## 2022-12-13 ENCOUNTER — TELEPHONE (OUTPATIENT)
Dept: CARDIOLOGY CLINIC | Age: 67
End: 2022-12-13

## 2022-12-13 NOTE — TELEPHONE ENCOUNTER
[facility] calling about a request for a PA for this pt ,They have questions  Please call them at [phone] option 1

## 2022-12-15 PROCEDURE — 93296 REM INTERROG EVL PM/IDS: CPT | Performed by: INTERNAL MEDICINE

## 2022-12-15 PROCEDURE — 93295 DEV INTERROG REMOTE 1/2/MLT: CPT | Performed by: INTERNAL MEDICINE

## 2022-12-28 NOTE — TELEPHONE ENCOUNTER
MEDICATION REFILL REQUEST      Name of Medication: Praluent  Dose:  150 mg  Frequency:  every 14 days  Quantity:  2  Days' supply:  30 with refills      Pharmacy Name/Location:  [facility] [phone]    Needs a new RX called in asap  please 37.2

## 2022-12-29 RX ORDER — ALIROCUMAB 150 MG/ML
150 INJECTION, SOLUTION SUBCUTANEOUS
Qty: 6 ADJUSTABLE DOSE PRE-FILLED PEN SYRINGE | Refills: 3 | Status: SHIPPED | OUTPATIENT
Start: 2022-12-29

## 2023-01-12 DIAGNOSIS — I44.7 LBBB (LEFT BUNDLE BRANCH BLOCK): ICD-10-CM

## 2023-01-12 DIAGNOSIS — I50.20 SYSTOLIC CONGESTIVE HEART FAILURE (HCC): ICD-10-CM

## 2023-01-12 DIAGNOSIS — Z95.810 S/P IMPLANTATION OF AUTOMATIC CARDIOVERTER/DEFIBRILLATOR (AICD): ICD-10-CM

## 2023-01-20 ENCOUNTER — HOSPITAL ENCOUNTER (EMERGENCY)
Age: 68
Discharge: HOME | End: 2023-01-20
Attending: EMERGENCY MEDICINE
Payer: MEDICARE

## 2023-01-20 ENCOUNTER — APPOINTMENT (OUTPATIENT)
Dept: GENERAL RADIOLOGY | Age: 68
End: 2023-01-20
Payer: MEDICARE

## 2023-01-20 VITALS
TEMPERATURE: 99.3 F | HEIGHT: 66 IN | BODY MASS INDEX: 21.86 KG/M2 | SYSTOLIC BLOOD PRESSURE: 103 MMHG | DIASTOLIC BLOOD PRESSURE: 58 MMHG | OXYGEN SATURATION: 96 % | WEIGHT: 136 LBS | HEART RATE: 95 BPM | RESPIRATION RATE: 16 BRPM

## 2023-01-20 DIAGNOSIS — U07.1 COVID-19 VIRUS INFECTION: Primary | ICD-10-CM

## 2023-01-20 LAB
ANION GAP SERPL CALC-SCNC: 10 MMOL/L (ref 2–11)
BASOPHILS # BLD: 0 K/UL (ref 0–0.2)
BASOPHILS NFR BLD: 0 % (ref 0–2)
BUN SERPL-MCNC: 15 MG/DL (ref 8–23)
CALCIUM SERPL-MCNC: 10 MG/DL (ref 8.3–10.4)
CHLORIDE SERPL-SCNC: 108 MMOL/L (ref 101–110)
CO2 SERPL-SCNC: 20 MMOL/L (ref 21–32)
CREAT SERPL-MCNC: 0.7 MG/DL (ref 0.6–1)
DIFFERENTIAL METHOD BLD: ABNORMAL
EOSINOPHIL # BLD: 0 K/UL (ref 0–0.8)
EOSINOPHIL NFR BLD: 0 % (ref 0.5–7.8)
ERYTHROCYTE [DISTWIDTH] IN BLOOD BY AUTOMATED COUNT: 13 % (ref 11.9–14.6)
GLUCOSE SERPL-MCNC: 126 MG/DL (ref 65–100)
HCT VFR BLD AUTO: 42.6 % (ref 35.8–46.3)
HGB BLD-MCNC: 14 G/DL (ref 11.7–15.4)
IMM GRANULOCYTES # BLD AUTO: 0 K/UL (ref 0–0.5)
IMM GRANULOCYTES NFR BLD AUTO: 0 % (ref 0–5)
LACTATE SERPL-SCNC: 1 MMOL/L (ref 0.4–2)
LYMPHOCYTES # BLD: 0.9 K/UL (ref 0.5–4.6)
LYMPHOCYTES NFR BLD: 16 % (ref 13–44)
MCH RBC QN AUTO: 30.3 PG (ref 26.1–32.9)
MCHC RBC AUTO-ENTMCNC: 32.9 G/DL (ref 31.4–35)
MCV RBC AUTO: 92.2 FL (ref 82–102)
MONOCYTES # BLD: 0.8 K/UL (ref 0.1–1.3)
MONOCYTES NFR BLD: 13 % (ref 4–12)
NEUTS SEG # BLD: 4.1 K/UL (ref 1.7–8.2)
NEUTS SEG NFR BLD: 71 % (ref 43–78)
NRBC # BLD: 0 K/UL (ref 0–0.2)
PLATELET # BLD AUTO: 174 K/UL (ref 150–450)
PMV BLD AUTO: 11.9 FL (ref 9.4–12.3)
POTASSIUM SERPL-SCNC: 3.9 MMOL/L (ref 3.5–5.1)
RBC # BLD AUTO: 4.62 M/UL (ref 4.05–5.2)
SODIUM SERPL-SCNC: 138 MMOL/L (ref 133–143)
WBC # BLD AUTO: 5.8 K/UL (ref 4.3–11.1)

## 2023-01-20 PROCEDURE — 80048 BASIC METABOLIC PNL TOTAL CA: CPT

## 2023-01-20 PROCEDURE — 85025 COMPLETE CBC W/AUTO DIFF WBC: CPT

## 2023-01-20 PROCEDURE — 2580000003 HC RX 258: Performed by: EMERGENCY MEDICINE

## 2023-01-20 PROCEDURE — 96361 HYDRATE IV INFUSION ADD-ON: CPT

## 2023-01-20 PROCEDURE — 99284 EMERGENCY DEPT VISIT MOD MDM: CPT

## 2023-01-20 PROCEDURE — 96374 THER/PROPH/DIAG INJ IV PUSH: CPT

## 2023-01-20 PROCEDURE — 6360000002 HC RX W HCPCS: Performed by: EMERGENCY MEDICINE

## 2023-01-20 PROCEDURE — 83605 ASSAY OF LACTIC ACID: CPT

## 2023-01-20 PROCEDURE — 71046 X-RAY EXAM CHEST 2 VIEWS: CPT

## 2023-01-20 RX ORDER — ONDANSETRON 4 MG/1
4 TABLET, FILM COATED ORAL 3 TIMES DAILY PRN
Qty: 12 TABLET | Refills: 0 | Status: SHIPPED | OUTPATIENT
Start: 2023-01-20

## 2023-01-20 RX ORDER — 0.9 % SODIUM CHLORIDE 0.9 %
1000 INTRAVENOUS SOLUTION INTRAVENOUS ONCE
Status: COMPLETED | OUTPATIENT
Start: 2023-01-20 | End: 2023-01-20

## 2023-01-20 RX ORDER — ONDANSETRON 2 MG/ML
4 INJECTION INTRAMUSCULAR; INTRAVENOUS ONCE
Status: COMPLETED | OUTPATIENT
Start: 2023-01-20 | End: 2023-01-20

## 2023-01-20 RX ORDER — BENZONATATE 100 MG/1
100 CAPSULE ORAL 3 TIMES DAILY PRN
Qty: 21 CAPSULE | Refills: 0 | Status: SHIPPED | OUTPATIENT
Start: 2023-01-20 | End: 2023-01-27

## 2023-01-20 RX ADMIN — SODIUM CHLORIDE 1000 ML: 9 INJECTION, SOLUTION INTRAVENOUS at 19:17

## 2023-01-20 RX ADMIN — ONDANSETRON 4 MG: 2 INJECTION INTRAMUSCULAR; INTRAVENOUS at 19:17

## 2023-01-20 ASSESSMENT — ENCOUNTER SYMPTOMS
ABDOMINAL PAIN: 0
SINUS PAIN: 0
WHEEZING: 0
COUGH: 1
NAUSEA: 1
SHORTNESS OF BREATH: 0
SORE THROAT: 0
VOMITING: 1
BACK PAIN: 0
DIARRHEA: 0
TROUBLE SWALLOWING: 0

## 2023-01-20 ASSESSMENT — LIFESTYLE VARIABLES
HOW OFTEN DO YOU HAVE A DRINK CONTAINING ALCOHOL: NEVER
HOW MANY STANDARD DRINKS CONTAINING ALCOHOL DO YOU HAVE ON A TYPICAL DAY: PATIENT DOES NOT DRINK

## 2023-01-20 NOTE — ED TRIAGE NOTES
Patient arrives in wheelchair to triage. States she tested positive for covid today, but the [facility] provider she saw recommended she be seen at the ER \"because of my heart condition. \"    Patient's only complaint is fatigue. A+Ox4. Masked.  NAD

## 2023-01-20 NOTE — ED PROVIDER NOTES
[employer] Emergency Department Provider Note                   PCP:                SHRAVAN Campbell CNP               Age: 69 y.o. Sex: female       Brandi Griffiths is a 69 y.o. female who presents to the Emergency Department with chief complaint of    Chief Complaint   Patient presents with    Concern For COVID-19      Patient presents for evaluation of COVID. Patient states that yesterday she started having fatigue. She states this morning she woke up with nasal congestion, cough, fever up to 102, vomiting, and muscle aches. Patient states that she has been nauseated but does have coughing fits that cause her to vomit. Patient does not feel short of breath and has no chest pain. Patient was seen at a [facility] clinic and diagnosed with COVID. Patient was sent to the ED for IV fluids. The history is provided by the patient and the spouse. All other systems reviewed and are negative. Review of Systems   Constitutional:  Positive for chills, fatigue and fever. HENT:  Positive for congestion. Negative for sinus pain, sore throat and trouble swallowing. Respiratory:  Positive for cough. Negative for shortness of breath and wheezing. Cardiovascular:  Negative for chest pain and leg swelling. Gastrointestinal:  Positive for nausea and vomiting. Negative for abdominal pain and diarrhea. Genitourinary:  Negative for decreased urine volume and dysuria. Musculoskeletal:  Positive for myalgias. Negative for arthralgias, back pain and neck pain. Skin:  Negative for rash. Neurological:  Negative for headaches.      Past Medical History:   Diagnosis Date    Anxiety disorder     Basal cell carcinoma of skin     Chronic insomnia     Coronary artery disease     COVID-19 12/2020    mild case     Dyslipidemia     Fibromyalgia     Gastroesophageal reflux disease     Interstitial cystitis     Irritable bowel syndrome     Ischemic colitis ([facility] [address] 2014    Multiple thyroid nodules     S/P thyroid bx 2019-benign    Osteopenia     Overactive bladder     Squamous cell carcinoma of skin     Systolic congestive heart failure ([facility] [address]         Past Surgical History:   Procedure Laterality Date    BREAST BIOPSY Left     BREAST SURGERY Bilateral 1988    CARDIAC CATHETERIZATION  2018     SECTION  1984    COLONOSCOPY      WNL    CORONARY ARTERY BYPASS GRAFT  2018    CABG X 3    PACEMAKER PLACEMENT  2018    and defibrillator    AYSE AND BSO (CERVIX REMOVED)  [address]  as a child        Family History   Problem Relation Age of Onset    Thyroid Disease Neg Hx     Thyroid Cancer Neg Hx     Breast Cancer Paternal Grandmother 67    Cancer Father         Octavio Tam, non smoker    Osteoarthritis Mother     Coronary Art Dis Mother         CABG, [place]        Social History     Socioeconomic History    Marital status:    Tobacco Use    Smoking status: Former     Packs/day: 15.00     Types: Cigarettes     Quit date: 3/1/1984     Years since quittin.9    Smokeless tobacco: Never   Substance and Sexual Activity    Alcohol use: No    Drug use: No        Allergies: Codeine, Conjugated estrogens, Doxycycline, Levofloxacin, Penicillins, and Sulfa antibiotics    Discharge Medication List as of 2023  9:36 PM        CONTINUE these medications which have NOT CHANGED    Details   Alirocumab (PRALUENT) 150 MG/ML SOAJ Inject 150 mg into the skin every 14 days, Disp-6 Adjustable Dose Pre-filled Pen Syringe, R-3Normal      carvedilol (COREG) 6.25 MG tablet TAKE 1 TABLET TWICE DAILY  WITH MEALS, Disp-180 tablet, R-3Normal      pantoprazole (PROTONIX) 40 MG tablet Take 1 tablet by mouth daily TAKE 1 TABLET DAILY, Disp-90 tablet, R-1Normal      sertraline (ZOLOFT) 50 MG tablet Take 1 tablet by mouth daily, Disp-30 tablet, R-2Normal      aspirin 81 MG EC tablet Take 81 mg by mouthHistorical Med      estradiol (ESTRACE) 0.1 MG/GM vaginal cream Apply 1 gm vaginally nightly x 2 weeks then reduce to 2x/weekHistorical Med      gabapentin (NEURONTIN) 100 MG capsule Take 100 mg by mouth. As neededHistorical Med      Meth-Hyo-M Bl-Na Phos-Ph Sal (URIBEL) 118 MG CAPS Take 1 capsule by mouth 4 times daily As neededHistorical Med      mirabegron (MYRBETRIQ) 25 MG TB24 Take 25 mg by mouth dailyHistorical Med      nitroGLYCERIN (NITROSTAT) 0.4 MG SL tablet Place 0.4 mg under the tongueHistorical Med              Vitals signs and nursing note reviewed. Patient Vitals for the past 4 hrs:   Temp Pulse Resp BP SpO2   01/20/23 2139 99.3 °F (37.4 °C) 95 16 (!) 103/58 96 %   01/20/23 1917 -- (!) 105 -- -- --   01/20/23 1845 99.1 °F (37.3 °C) (!) 108 20 120/77 95 %          Physical Exam  Vitals and nursing note reviewed. Constitutional:       Appearance: Normal appearance. HENT:      Head: Normocephalic and atraumatic. Nose: No congestion. Right Sinus: No maxillary sinus tenderness or frontal sinus tenderness. Left Sinus: No maxillary sinus tenderness or frontal sinus tenderness. Mouth/Throat:      Mouth: Mucous membranes are moist.      Pharynx: Uvula midline. No pharyngeal swelling or uvula swelling. Tonsils: No tonsillar abscesses. Eyes:      Conjunctiva/sclera: Conjunctivae normal.   Neck:      Trachea: Trachea and phonation normal.   Cardiovascular:      Rate and Rhythm: Normal rate and regular rhythm. Pulses: Normal pulses. Heart sounds: Normal heart sounds. Pulmonary:      Effort: Pulmonary effort is normal.      Breath sounds: Normal breath sounds. Abdominal:      General: Abdomen is flat. Bowel sounds are normal.      Palpations: Abdomen is soft. Musculoskeletal:         General: No swelling or tenderness. Normal range of motion. Cervical back: Normal range of motion and neck supple. No rigidity. Skin:     General: Skin is warm and dry. Neurological:      General: No focal deficit present.       Mental Status: She is alert and oriented to person, place, and time. Orders Placed This Encounter   Procedures    XR CHEST (2 VW)    CBC with Auto Differential    Basic Metabolic Panel    Lactate, Sepsis    Saline lock IV       Procedures        Results Include:    Recent Results (from the past 24 hour(s))   CBC with Auto Differential    Collection Time: 01/20/23  7:09 PM   Result Value Ref Range    WBC 5.8 4.3 - 11.1 K/uL    RBC 4.62 4.05 - 5.2 M/uL    Hemoglobin 14.0 11.7 - 15.4 g/dL    Hematocrit 42.6 35.8 - 46.3 %    MCV 92.2 82 - 102 FL    MCH 30.3 26.1 - 32.9 PG    MCHC 32.9 31.4 - 35.0 g/dL    RDW 13.0 11.9 - 14.6 %    Platelets 623 285 - 571 K/uL    MPV 11.9 9.4 - 12.3 FL    nRBC 0.00 0.0 - 0.2 K/uL    Differential Type AUTOMATED      Seg Neutrophils 71 43 - 78 %    Lymphocytes 16 13 - 44 %    Monocytes 13 (H) 4.0 - 12.0 %    Eosinophils % 0 (L) 0.5 - 7.8 %    Basophils 0 0.0 - 2.0 %    Immature Granulocytes 0 0.0 - 5.0 %    Segs Absolute 4.1 1.7 - 8.2 K/UL    Absolute Lymph # 0.9 0.5 - 4.6 K/UL    Absolute Mono # 0.8 0.1 - 1.3 K/UL    Absolute Eos # 0.0 0.0 - 0.8 K/UL    Basophils Absolute 0.0 0.0 - 0.2 K/UL    Absolute Immature Granulocyte 0.0 0.0 - 0.5 K/UL   Basic Metabolic Panel    Collection Time: 01/20/23  7:09 PM   Result Value Ref Range    Sodium 138 133 - 143 mmol/L    Potassium 3.9 3.5 - 5.1 mmol/L    Chloride 108 101 - 110 mmol/L    CO2 20 (L) 21 - 32 mmol/L    Anion Gap 10 2 - 11 mmol/L    Glucose 126 (H) 65 - 100 mg/dL    BUN 15 8 - 23 MG/DL    Creatinine 0.70 0.6 - 1.0 MG/DL    Est, Glom Filt Rate >60 >60 ml/min/1.73m2    Calcium 10.0 8.3 - 10.4 MG/DL   Lactate, Sepsis    Collection Time: 01/20/23  7:09 PM   Result Value Ref Range    Lactic Acid, Sepsis 1.0 0.4 - 2.0 MMOL/L          XR CHEST (2 VW)   Final Result   1. No acute cardiopulmonary abnormality. Emphysematous lungs.    2. Cardiac pacer with a somewhat tortuous lead evident along the dorsum of the   heart in the region of the coronary sinus which may be appropriate although this   is a change from December 2018. Clinical correlation is advised. ED Course as of 01/20/23 2156   Fred Palmer Jan 20, 2023 2129 Patient is resting comfortably in chair. She received her Zofran and IV fluids and states she feels much better and her nausea controlled. I explained the results and plan and she is comfortable with discharge. [CW]      ED Course User Index  [CW] Ebony Dickson MD       MDM  Number of Diagnoses or Management Options  COVID-19 virus infection  Diagnosis management comments: Patient presents for evaluation of COVID infection. [facility] Patient's chest x-ray showed no acute findings. Patient's lungs are clear with pulse ox of 95%. Patient's CBC showed normal white count. Patient's BMP showed normal electrolytes and renal function. Patient's lactate is normal and no indication of sepsis or dehydration. Patient was given IV fluid and Zofran and felt better. Patient is well-appearing and I do not feel that she needs admission to the hospital.  I did discuss the risk and benefits of Paxilovid with the patient and using shared clinical decision making, we felt the risk outweighed the benefit and she was not prescribed this. She was prescribed Zofran and Tessalon. Patient was discharged home with primary care follow-up. Explanation: I have considered all emergent medical conditions that could have caused patient's presentation to the emergency department today. Based on their history, physical, and thorough evaluation I have excluded all emergent medical conditions that require any further evaluation today in the ED or hospital.  I feel that the patient is safe for discharge. The diagnosis and plan as well as the results of any testing (if done) and treatments (if done) today in the emergency department were communicated to the patient and/or their family/caregiver (if present).   The patient/caregiver verbalized understanding and compliance with the treatment plan and reasons to return immediately to the emergency department. All questions were answered. ICD-10-CM    1. COVID-19 virus infection  U07.1           DISPOSITION Decision To Discharge 01/20/2023 09:30:28 PM       Voice dictation software was used during the making of this note. This software is not perfect and grammatical and other typographical errors may be present. This note has not been completely proofread for errors.      Isael Nicole MD  01/20/23 1777

## 2023-01-21 NOTE — ED NOTES
I have reviewed discharge instructions with the patient. The patient verbalized understanding. Patient left ED via Discharge Method: wheelchair to Home with spouse. Opportunity for questions and clarification provided. Patient given 2 scripts. To continue your aftercare when you leave the hospital, you may receive an automated call from our care team to check in on how you are doing. This is a free service and part of our promise to provide the best care and service to meet your aftercare needs.  If you have questions, or wish to unsubscribe from this service please call [phone]. Thank you for Choosing our [facility] Emergency Department.           Wei Lazo RN  01/20/23 3464

## 2023-03-02 SDOH — HEALTH STABILITY: PHYSICAL HEALTH: ON AVERAGE, HOW MANY MINUTES DO YOU ENGAGE IN EXERCISE AT THIS LEVEL?: 30 MIN

## 2023-03-02 SDOH — HEALTH STABILITY: PHYSICAL HEALTH: ON AVERAGE, HOW MANY DAYS PER WEEK DO YOU ENGAGE IN MODERATE TO STRENUOUS EXERCISE (LIKE A BRISK WALK)?: 7 DAYS

## 2023-03-02 ASSESSMENT — PATIENT HEALTH QUESTIONNAIRE - PHQ9
SUM OF ALL RESPONSES TO PHQ QUESTIONS 1-9: 1
SUM OF ALL RESPONSES TO PHQ QUESTIONS 1-9: 1
SUM OF ALL RESPONSES TO PHQ9 QUESTIONS 1 & 2: 1
SUM OF ALL RESPONSES TO PHQ QUESTIONS 1-9: 1
SUM OF ALL RESPONSES TO PHQ QUESTIONS 1-9: 1
1. LITTLE INTEREST OR PLEASURE IN DOING THINGS: 1
2. FEELING DOWN, DEPRESSED OR HOPELESS: 0

## 2023-03-02 ASSESSMENT — LIFESTYLE VARIABLES
HOW OFTEN DO YOU HAVE SIX OR MORE DRINKS ON ONE OCCASION: 1
HOW OFTEN DO YOU HAVE A DRINK CONTAINING ALCOHOL: NEVER
HOW OFTEN DO YOU HAVE A DRINK CONTAINING ALCOHOL: 1
HOW MANY STANDARD DRINKS CONTAINING ALCOHOL DO YOU HAVE ON A TYPICAL DAY: PATIENT DOES NOT DRINK
HOW MANY STANDARD DRINKS CONTAINING ALCOHOL DO YOU HAVE ON A TYPICAL DAY: 0

## 2023-03-09 ENCOUNTER — OFFICE VISIT (OUTPATIENT)
Dept: INTERNAL MEDICINE CLINIC | Facility: CLINIC | Age: 68
End: 2023-03-09
Payer: MEDICARE

## 2023-03-09 VITALS
SYSTOLIC BLOOD PRESSURE: 120 MMHG | OXYGEN SATURATION: 96 % | TEMPERATURE: 97.2 F | HEART RATE: 74 BPM | HEIGHT: 66 IN | BODY MASS INDEX: 22.63 KG/M2 | DIASTOLIC BLOOD PRESSURE: 73 MMHG | WEIGHT: 140.8 LBS

## 2023-03-09 DIAGNOSIS — Z00.00 MEDICARE ANNUAL WELLNESS VISIT, SUBSEQUENT: Primary | ICD-10-CM

## 2023-03-09 DIAGNOSIS — K21.9 GASTROESOPHAGEAL REFLUX DISEASE, UNSPECIFIED WHETHER ESOPHAGITIS PRESENT: ICD-10-CM

## 2023-03-09 DIAGNOSIS — N30.10 INTERSTITIAL CYSTITIS: ICD-10-CM

## 2023-03-09 DIAGNOSIS — M85.89 OSTEOPENIA OF MULTIPLE SITES: ICD-10-CM

## 2023-03-09 DIAGNOSIS — I42.0 DILATED CARDIOMYOPATHY (HCC): ICD-10-CM

## 2023-03-09 DIAGNOSIS — E04.2 MULTIPLE THYROID NODULES: ICD-10-CM

## 2023-03-09 DIAGNOSIS — F41.9 ANXIETY AND DEPRESSION: ICD-10-CM

## 2023-03-09 DIAGNOSIS — F32.A ANXIETY AND DEPRESSION: ICD-10-CM

## 2023-03-09 DIAGNOSIS — I25.10 CORONARY ARTERY DISEASE INVOLVING NATIVE CORONARY ARTERY OF NATIVE HEART WITHOUT ANGINA PECTORIS: ICD-10-CM

## 2023-03-09 PROCEDURE — G8484 FLU IMMUNIZE NO ADMIN: HCPCS | Performed by: NURSE PRACTITIONER

## 2023-03-09 PROCEDURE — 1123F ACP DISCUSS/DSCN MKR DOCD: CPT | Performed by: NURSE PRACTITIONER

## 2023-03-09 PROCEDURE — G0439 PPPS, SUBSEQ VISIT: HCPCS | Performed by: NURSE PRACTITIONER

## 2023-03-09 PROCEDURE — 3017F COLORECTAL CA SCREEN DOC REV: CPT | Performed by: NURSE PRACTITIONER

## 2023-03-09 RX ORDER — PANTOPRAZOLE SODIUM 40 MG/1
40 TABLET, DELAYED RELEASE ORAL DAILY
Qty: 90 TABLET | Refills: 1 | Status: SHIPPED | OUTPATIENT
Start: 2023-03-09

## 2023-03-09 RX ORDER — CICLOPIROX OLAMINE 7.7 MG/100ML
SUSPENSION TOPICAL
COMMUNITY
Start: 2023-01-16

## 2023-03-09 ASSESSMENT — PATIENT HEALTH QUESTIONNAIRE - PHQ9
9. THOUGHTS THAT YOU WOULD BE BETTER OFF DEAD, OR OF HURTING YOURSELF: 0
10. IF YOU CHECKED OFF ANY PROBLEMS, HOW DIFFICULT HAVE THESE PROBLEMS MADE IT FOR YOU TO DO YOUR WORK, TAKE CARE OF THINGS AT HOME, OR GET ALONG WITH OTHER PEOPLE: 0
8. MOVING OR SPEAKING SO SLOWLY THAT OTHER PEOPLE COULD HAVE NOTICED. OR THE OPPOSITE, BEING SO FIGETY OR RESTLESS THAT YOU HAVE BEEN MOVING AROUND A LOT MORE THAN USUAL: 0
SUM OF ALL RESPONSES TO PHQ QUESTIONS 1-9: 0
6. FEELING BAD ABOUT YOURSELF - OR THAT YOU ARE A FAILURE OR HAVE LET YOURSELF OR YOUR FAMILY DOWN: 0
3. TROUBLE FALLING OR STAYING ASLEEP: 0
7. TROUBLE CONCENTRATING ON THINGS, SUCH AS READING THE NEWSPAPER OR WATCHING TELEVISION: 0
5. POOR APPETITE OR OVEREATING: 0
SUM OF ALL RESPONSES TO PHQ QUESTIONS 1-9: 0
1. LITTLE INTEREST OR PLEASURE IN DOING THINGS: 0
SUM OF ALL RESPONSES TO PHQ9 QUESTIONS 1 & 2: 0
2. FEELING DOWN, DEPRESSED OR HOPELESS: 0
SUM OF ALL RESPONSES TO PHQ QUESTIONS 1-9: 0
SUM OF ALL RESPONSES TO PHQ QUESTIONS 1-9: 0
4. FEELING TIRED OR HAVING LITTLE ENERGY: 0

## 2023-03-09 ASSESSMENT — ENCOUNTER SYMPTOMS
DIARRHEA: 0
ABDOMINAL PAIN: 0
NAUSEA: 0
TROUBLE SWALLOWING: 0
SHORTNESS OF BREATH: 0
COUGH: 0
VOMITING: 0
PHOTOPHOBIA: 0
WHEEZING: 0

## 2023-03-09 NOTE — PATIENT INSTRUCTIONS
Advance Directives: Care Instructions  Overview  An advance directive is a legal way to state your wishes at the end of your life. It tells your family and your doctor what to do if you can't say what you want. There are two main types of advance directives. You can change them any time your wishes change. Living will. This form tells your family and your doctor your wishes about life support and other treatment. The form is also called a declaration. Medical power of . This form lets you name a person to make treatment decisions for you when you can't speak for yourself. This person is called a health care agent (health care proxy, health care surrogate). The form is also called a durable power of  for health care. If you do not have an advance directive, decisions about your medical care may be made by a family member, or by a doctor or a  who doesn't know you. It may help to think of an advance directive as a gift to the people who care for you. If you have one, they won't have to make tough decisions by themselves. For more information, including forms for your state, see the [address] website (www.caringinfo.org/planning/advance-directives/). Follow-up care is a key part of your treatment and safety. Be sure to make and go to all appointments, and call your doctor if you are having problems. It's also a good idea to know your test results and keep a list of the medicines you take. What should you include in an advance directive? Many states have a unique advance directive form. (It may ask you to address specific issues.) Or you might use a universal form that's approved by many states. If your form doesn't tell you what to address, it may be hard to know what to include in your advance directive. Use the questions below to help you get started. Who do you want to make decisions about your medical care if you are not able to?   What life-support measures do you want if you have a serious illness that gets worse over time or can't be cured? What are you most afraid of that might happen? (Maybe you're afraid of having pain, losing your independence, or being kept alive by machines.)  Where would you prefer to die? (Your home? A hospital? A nursing home?)  Do you want to donate your organs when you die? Do you want certain Worship practices performed before you die? When should you call for help? Be sure to contact your doctor if you have any questions. Where can you learn more? Go to http://www.allen.com/ and enter R264 to learn more about \"Advance Directives: Care Instructions. \"  Current as of: June 16, 2022               Content Version: 13.5  © 2006-2022 [employer]. Care instructions adapted under license by [facility] ([facility]). If you have questions about a medical condition or this instruction, always ask your healthcare professional. [place] [address] any warranty or liability for your use of this information. A Healthy Heart: Care Instructions  Your Care Instructions     Coronary artery disease, also called heart disease, occurs when a substance called plaque builds up in the vessels that supply oxygen-rich blood to your heart muscle. This can narrow the blood vessels and reduce blood flow. A heart attack happens when blood flow is completely blocked. A high-fat diet, smoking, and other factors increase the risk of heart disease. Your doctor has found that you have a chance of having heart disease. You can do lots of things to keep your heart healthy. It may not be easy, but you can change your diet, exercise more, and quit smoking. These steps really work to lower your chance of heart disease. Follow-up care is a key part of your treatment and safety. Be sure to make and go to all appointments, and call your doctor if you are having problems.  It's also a good idea to know your test results and keep a list of the medicines you take. How can you care for yourself at home? Diet    Use less salt when you cook and eat. This helps lower your blood pressure. Taste food before salting. Add only a little salt when you think you need it. With time, your taste buds will adjust to less salt.     Eat fewer snack items, fast foods, canned soups, and other high-salt, high-fat, processed foods.     Read food labels and try to avoid saturated and trans fats. They increase your risk of heart disease by raising cholesterol levels.     Limit the amount of solid fat-butter, margarine, and shortening-you eat. Use olive, peanut, or canola oil when you cook. Bake, broil, and steam foods instead of frying them.     Eat a variety of fruit and vegetables every day. Dark green, deep orange, red, or yellow fruits and vegetables are especially good for you. Examples include spinach, carrots, peaches, and berries.     Foods high in fiber can reduce your cholesterol and provide important vitamins and minerals. High-fiber foods include whole-grain cereals and breads, oatmeal, beans, brown rice, citrus fruits, and apples.     Eat lean proteins. Heart-healthy proteins include seafood, lean meats and poultry, eggs, beans, peas, nuts, seeds, and soy products.     Limit drinks and foods with added sugar. These include candy, desserts, and soda pop. Lifestyle changes    If your doctor recommends it, get more exercise. Walking is a good choice. Bit by bit, increase the amount you walk every day. Try for at least 30 minutes on most days of the week. You also may want to swim, bike, or do other activities.     Do not smoke. If you need help quitting, talk to your doctor about stop-smoking programs and medicines. These can increase your chances of quitting for good. Quitting smoking may be the most important step you can take to protect your heart. It is never too late to quit.     Limit alcohol to 2 drinks a day for men and 1 drink a day for women.  Too much alcohol can cause health problems.     Manage other health problems such as diabetes, high blood pressure, and high cholesterol. If you think you may have a problem with alcohol or drug use, talk to your doctor. Medicines    Take your medicines exactly as prescribed. Call your doctor if you think you are having a problem with your medicine.     If your doctor recommends aspirin, take the amount directed each day. Make sure you take aspirin and not another kind of pain reliever, such as acetaminophen (Tylenol). When should you call for help? Call 911 if you have symptoms of a heart attack. These may include:    Chest pain or pressure, or a strange feeling in the chest.     Sweating.     Shortness of breath.     Pain, pressure, or a strange feeling in the back, neck, jaw, or upper belly or in one or both shoulders or arms.     Lightheadedness or sudden weakness.     A fast or irregular heartbeat. After you call 911, the  may tell you to chew 1 adult-strength or 2 to 4 low-dose aspirin. Wait for an ambulance. Do not try to drive yourself. Watch closely for changes in your health, and be sure to contact your doctor if you have any problems. Where can you learn more? Go to http://www.allen.com/ and enter F075 to learn more about \"A Healthy Heart: Care Instructions. \"  Current as of: September 7, 2022               Content Version: 13.5  © 2179-6558 [employer]. Care instructions adapted under license by [facility] ([facility]). If you have questions about a medical condition or this instruction, always ask your healthcare professional. [place] [address] any warranty or liability for your use of this information. Personalized Preventive Plan for Aaorn Nieves - 3/9/2023  Medicare offers a range of preventive health benefits. Some of the tests and screenings are paid in full while other may be subject to a deductible, co-insurance, and/or copay.     Some of these benefits include a comprehensive review of your medical history including lifestyle, illnesses that may run in your family, and various assessments and screenings as appropriate. After reviewing your medical record and screening and assessments performed today your provider may have ordered immunizations, labs, imaging, and/or referrals for you. A list of these orders (if applicable) as well as your Preventive Care list are included within your After Visit Summary for your review. Other Preventive Recommendations:    A preventive eye exam performed by an eye specialist is recommended every 1-2 years to screen for glaucoma; cataracts, macular degeneration, and other eye disorders. A preventive dental visit is recommended every 6 months. Try to get at least 150 minutes of exercise per week or 10,000 steps per day on a pedometer . Order or download the FREE \"Exercise & Physical Activity: Your Everyday Guide\" from The [employer]. Call 2-441.870.9091 or search The [employer] A[employer] online. You need 2000-2717 mg of calcium and 5317-1116 IU of vitamin D per day. It is possible to meet your calcium requirement with diet alone, but a vitamin D supplement is usually necessary to meet this goal.  When exposed to the sun, use a sunscreen that protects against both UVA and UVB radiation with an SPF of 30 or greater. Reapply every 2 to 3 hours or after sweating, drying off with a towel, or swimming. Always wear a seat belt when traveling in a car. Always wear a helmet when riding a bicycle or motorcycle.

## 2023-03-09 NOTE — PROGRESS NOTES
[facility]  Office Visit Note    Subjective:  Chief Complaint   Patient presents with    Medicare AWV       Patient ID: Harry Natarajan is a 69 y.o. female presenting to the office for the above. 64-year-old female for follow up and Medicare Wellness visit. . Has a son, stepdaughter, and several grandchildren. She is a hairdresser, semi-retired. CAD / systolic CHF / dilated cardiomyopathy / Dyslipidemia--  Follows with [facility], Dr. Elif Corcoran. Status post CABG x3 (LIMA to LAD). Biventricular ICD. Echo October 2019 with EF 50%. Takes Coreg 6.25mg BID, aspirin 81mg daily. On Praluent. Tolerating well without report of side effects. Previous treatments: Statins caused myalgias. Last lipid panel November 2022, with cholesterol 156, HDL 49, LDL 71, trigs 177. She does not check her blood pressure at home. She follows a low sodium diet. She walks for exercise. Denies chest pain, palpitations, shortness of breath, peripheral edema, severe headaches, dizziness, vision changes. --Advise low sodium diet, regular exercise. Check BP regularly at home, and notify office if consistently >130/90. Multiple thyroid nodules--  Follows with endocrinology, Dr. Dirk Hurtado. Biopsy November 2019, benign. Ultrasound May 2021 stable. To follow up in May 2023. Anxiety / Depression--  Previous treatments include: Wellbutrin (made her feel more aggressive), Celexa (stopped due to potential drug-drug interaction with Cipro, which she is prescribed when she has a UTI), Prozac (made her \"numb\"), Paxil (made her not want to get out of bed), Lexapro (made her not want to get out of bed), Effexor (caused panic attacks)   Mood is stable. Denies thoughts of hurting self or others. Appetite normal.  ADL performance independent. Good support system at home. She has chronic issues with insomnia. Melatonin helps with sleep, but sometimes causes morning drowsiness.    3/9/23: She started trial of Zoloft 50mg daily in November 2022 but stopped taking it, did not like how it made her feel. She has increased her exercise and states she is feeling well. Does not want to try alternative medication at this time. Interstitial cystitis, overactive bladder--  Follows with urogynecology, Dr. Gretchen Garay. Has done pelvic floor PT. Left UPJO--  Diagnosed in early 35s, opted for observation. Rediscovered after her fall earlier in 2022. Was evaluated by urology and elected for observation. To monitor renal function and report new flank pain. GERD--  Symptoms stable on pantoprazole 40mg daily. No red flag symptoms. Osteoporosis--  Received Reclast (around 2012 per patient report). Previously treated with Fosamax (caused GERD, was only on for several months) then changed to Actonel (also caused GERD, and was only on it for a few months). DEXA September 2015 showed improvement into osteopenia range. DEXA March 2021 showed loss of bone mineral density, still in osteopenia range, but worsened since last DEXA in 2015. FRAX indicates 10-year risk of a hip fracture is 1.9% and of any major osteoporotic fracture is 11%. She suffered sacral insufficiency fracture after a fall in October 2021. Vitamin D was 49.6 in March 2022. --Advise calcium and vitamin D supplements, weight bearing exercise to strengthen bones, and take precautions to avoid falls. Educational handouts given. --We have discussed trying another medication for her osteoporosis, such as Prolia. She expresses understanding of risks and benefits, but declines at this time. Will let me know if she reconsiders. Needs Rx for Allegra for her allergies.        Health Maintenance:  *Medicare Wellness: 3/9/23   *Colorectal cancer screening: colonoscopy 2014, 10 year follow up   *Mammogram: October 2022    *DEXA: March 2021; ordered today   *TDAP: declines   *Pneumovax: declines   *Prevnar: declines   *Shingrix: declines *Covid19: 4/10/21   *Flu: Declined despite counseling that a yearly flu shot is recommended to lower risk for morbidity/mortality from influenza. We discussed the risks and benefits of these immunizations. Patient expresses understanding, and declines. History:   Allergies   Allergen Reactions    Codeine Nausea Only    Conjugated Estrogens Palpitations     Patient does not know why this is on the list    Doxycycline Nausea Only    Levofloxacin Nausea And Vomiting    Penicillins Rash    Sulfa Antibiotics Headaches       Past Medical History:   Diagnosis Date    Anxiety disorder     Basal cell carcinoma of skin     Chronic insomnia     Coronary artery disease     COVID-19 2020    mild case     Dyslipidemia     Fibromyalgia     Gastroesophageal reflux disease     Interstitial cystitis     Irritable bowel syndrome     Ischemic colitis ([facility] [address] 2014    Multiple thyroid nodules     S/P thyroid bx 2019-benign    Osteopenia     Overactive bladder     Squamous cell carcinoma of skin     Systolic congestive heart failure [facility])        Past Surgical History:   Procedure Laterality Date    BREAST BIOPSY Left     BREAST SURGERY Bilateral     CARDIAC CATHETERIZATION  2018     SECTION      COLONOSCOPY      WNL    CORONARY ARTERY BYPASS GRAFT  2018    CABG X 3    PACEMAKER PLACEMENT  2018    and defibrillator    AYSE AND BSO (CERVIX REMOVED)  [address]  as a child       Family History   Problem Relation Age of Onset    Thyroid Disease Neg Hx     Thyroid Cancer Neg Hx     Breast Cancer Paternal Grandmother 67    Cancer Father         Karen Snooks, non smoker    Osteoarthritis Mother     Coronary Art Dis Mother         CABG, [place]       Social History     Tobacco Use   Smoking Status Former    Packs/day: 15.00    Types: Cigarettes    Quit date: 3/1/1984    Years since quittin.0   Smokeless Tobacco Never       Social History     Substance and Sexual Activity   Alcohol Use No       Current Outpatient Medications   Medication Sig Dispense Refill    Ciclopirox Olamine 0.77 % SUSP APPLY TO TOENAILS TWICE DAILY      pantoprazole (PROTONIX) 40 MG tablet Take 1 tablet by mouth daily TAKE 1 TABLET DAILY 90 tablet 1    ondansetron (ZOFRAN) 4 MG tablet Take 1 tablet by mouth 3 times daily as needed for Nausea or Vomiting 12 tablet 0    Alirocumab (PRALUENT) 150 MG/ML SOAJ Inject 150 mg into the skin every 14 days 6 Adjustable Dose Pre-filled Pen Syringe 3    carvedilol (COREG) 6.25 MG tablet TAKE 1 TABLET TWICE DAILY  WITH MEALS 180 tablet 3    sertraline (ZOLOFT) 50 MG tablet Take 1 tablet by mouth daily 30 tablet 2    aspirin 81 MG EC tablet Take 81 mg by mouth      estradiol (ESTRACE) 0.1 MG/GM vaginal cream Apply 1 gm vaginally nightly x 2 weeks then reduce to 2x/week      gabapentin (NEURONTIN) 100 MG capsule Take 100 mg by mouth. As needed      Meth-Hyo-M Bl-Na Phos-Ph Sal (URIBEL) 118 MG CAPS Take 1 capsule by mouth 4 times daily As needed      mirabegron (MYRBETRIQ) 25 MG TB24 Take 25 mg by mouth daily      nitroGLYCERIN (NITROSTAT) 0.4 MG SL tablet Place 0.4 mg under the tongue       No current facility-administered medications for this visit. Review of Systems:  Review of Systems   Constitutional:  Negative for activity change, appetite change, chills, fever and unexpected weight change. HENT:  Negative for congestion and trouble swallowing. Eyes:  Negative for photophobia and visual disturbance. Respiratory:  Negative for cough, shortness of breath and wheezing. Cardiovascular:  Negative for chest pain, palpitations and leg swelling. Gastrointestinal:  Negative for abdominal pain, diarrhea, nausea and vomiting. Skin:  Negative for pallor and rash. Neurological:  Negative for dizziness, seizures, syncope, weakness and headaches. Psychiatric/Behavioral:  Negative for dysphoric mood. The patient is not nervous/anxious.              See HPI for other pertinent positives and negatives. Objective:  Vitals:    03/09/23 0923   BP: 120/73   Site: Right Upper Arm   Position: Sitting   Cuff Size: Small Adult   Pulse: 74   Temp: 97.2 °F (36.2 °C)   SpO2: 96%   Weight: 140 lb 12.8 oz (63.9 kg)   Height: 5' 6\" (1.676 m)       Body mass index is 22.73 kg/m². Physical Exam  Vitals and nursing note reviewed. Constitutional:       General: She is not in acute distress. Appearance: Normal appearance. She is not ill-appearing or diaphoretic. HENT:      Head: Normocephalic and atraumatic. Right Ear: Tympanic membrane, ear canal and external ear normal. There is no impacted cerumen. Left Ear: Tympanic membrane, ear canal and external ear normal. There is no impacted cerumen. Mouth/Throat:      Mouth: Mucous membranes are moist.      Pharynx: Oropharynx is clear. Eyes:      General: No scleral icterus. Right eye: No discharge. Left eye: No discharge. Pupils: Pupils are equal, round, and reactive to light. Cardiovascular:      Rate and Rhythm: Normal rate and regular rhythm. Pulses: Normal pulses. Heart sounds: Normal heart sounds. Pulmonary:      Effort: Pulmonary effort is normal. No respiratory distress. Breath sounds: Normal breath sounds. No wheezing, rhonchi or rales. Abdominal:      General: Bowel sounds are normal.      Palpations: Abdomen is soft. Musculoskeletal:      Cervical back: Normal range of motion. No rigidity. Right lower leg: No edema. Left lower leg: No edema. Lymphadenopathy:      Cervical: No cervical adenopathy. Skin:     General: Skin is warm and dry. Neurological:      Mental Status: She is alert and oriented to person, place, and time.    Psychiatric:         Mood and Affect: Mood normal.         Speech: Speech normal.         Behavior: Behavior normal.                Lab Results   Component Value Date    WBC 5.8 01/20/2023    HGB 14.0 01/20/2023    HCT 42.6 01/20/2023    MCV 92.2 01/20/2023     01/20/2023   ,   Lab Results   Component Value Date/Time     01/20/2023 07:09 PM    K 3.9 01/20/2023 07:09 PM     01/20/2023 07:09 PM    CO2 20 01/20/2023 07:09 PM    BUN 15 01/20/2023 07:09 PM    CREATININE 0.70 01/20/2023 07:09 PM    GLUCOSE 126 01/20/2023 07:09 PM    CALCIUM 10.0 01/20/2023 07:09 PM    ,   Lab Results   Component Value Date    TSH 1.260 03/03/2022   ,   Lab Results   Component Value Date    ALT 28 11/28/2022    AST 20 11/28/2022    ALKPHOS 54 11/28/2022    BILITOT 0.3 11/28/2022   ,   Lab Results   Component Value Date    LABMICR Comment 03/03/2022       PHQ-9  3/9/2023   Little interest or pleasure in doing things 0   Little interest or pleasure in doing things -   Feeling down, depressed, or hopeless 0   Trouble falling or staying asleep, or sleeping too much 0   Feeling tired or having little energy 0   Poor appetite or overeating 0   Feeling bad about yourself - or that you are a failure or have let yourself or your family down 0   Trouble concentrating on things, such as reading the newspaper or watching television 0   Moving or speaking so slowly that other people could have noticed. Or the opposite - being so fidgety or restless that you have been moving around a lot more than usual 0   Thoughts that you would be better off dead, or of hurting yourself in some way 0   PHQ-2 Score 0   Total Score PHQ 2 -   PHQ-9 Total Score 0   If you checked off any problems, how difficult have these problems made it for you to do your work, take care of things at home, or get along with other people?  0   How difficult have these problems made it for you to do your work, take care of your home and get along with others -        Assessment/Plan:      Health Maintenance Due   Topic Date Due    Pneumococcal 65+ years Vaccine (1 - PCV) Never done    DTaP/Tdap/Td vaccine (1 - Tdap) Never done    Shingles vaccine (1 of 2) Never done    COVID-19 Vaccine (2 - Booster for Oswaldo series) 06/05/2021    Flu vaccine (1) Never done          Harjit Cobian was seen today for medicare awv. Diagnoses and all orders for this visit:    Medicare annual wellness visit, subsequent    Coronary artery disease involving native coronary artery of native heart without angina pectoris    [facility]    Osteopenia of multiple sites  -     DEXA BONE DENSITY AXIAL SKELETON; Future    Gastroesophageal reflux disease, unspecified whether esophagitis present  Comments:  stable   Orders:  -     pantoprazole (PROTONIX) 40 MG tablet; Take 1 tablet by mouth daily TAKE 1 TABLET DAILY    Anxiety and depression    Multiple thyroid nodules    Interstitial cystitis      Patient states she is otherwise doing well; has no further questions or concerns at this visit. Encouraged to contact office with any concerns prior to next visit. Advise patient to notify office if they do not receive results of any labs or tests ordered within 2-3 days of the lab/test.     Return in about 6 months (around 9/9/2023), or if symptoms worsen or fail to improve, for Follow up, Fasting labs. SHRAVAN Kohler - CNP      Medicare Annual Wellness Visit    Aaron Nieves is here for Medicare AWV    Assessment & Plan   Medicare annual wellness visit, subsequent  Coronary artery disease involving native coronary artery of native heart without angina pectoris  Dilated cardiomyopathy ([facility]  Osteopenia of multiple sites  -     DEXA BONE DENSITY AXIAL SKELETON; Future  Gastroesophageal reflux disease, unspecified whether esophagitis present  Comments:  stable   Orders:  -     pantoprazole (PROTONIX) 40 MG tablet;  Take 1 tablet by mouth daily TAKE 1 TABLET DAILY, Disp-90 tablet, R-1Normal  Anxiety and depression  Multiple thyroid nodules  Interstitial cystitis      Recommendations for Preventive Services Due: see orders and patient instructions/AVS.  Recommended screening schedule for the next 5-10 years is provided to the patient in written form: see Patient Instructions/AVS.     Return in about 6 months (around 9/9/2023), or if symptoms worsen or fail to improve, for Follow up, Fasting labs. Subjective     Patient's complete Health Risk Assessment and screening values have been reviewed and are found in Flowsheets. The following problems were reviewed today and where indicated follow up appointments were made and/or referrals ordered. Positive Risk Factor Screenings with Interventions:                       Advanced Directives:  Do you have a Living Will?: (!) No    Intervention:  has NO advanced directive - information provided              Objective   Vitals:    03/09/23 0923   BP: 120/73   Site: Right Upper Arm   Position: Sitting   Cuff Size: Small Adult   Pulse: 74   Temp: 97.2 °F (36.2 °C)   SpO2: 96%   Weight: 140 lb 12.8 oz (63.9 kg)   Height: 5' 6\" (1.676 m)      Body mass index is 22.73 kg/m². Allergies   Allergen Reactions    Codeine Nausea Only    Conjugated Estrogens Palpitations     Patient does not know why this is on the list    Doxycycline Nausea Only    Levofloxacin Nausea And Vomiting    Penicillins Rash    Sulfa Antibiotics Headaches     Prior to Visit Medications    Medication Sig Taking?  Authorizing Provider   Ciclopirox Olamine 0.77 % SUSP APPLY TO TOENAILS TWICE DAILY Yes Historical Provider, MD   pantoprazole (PROTONIX) 40 MG tablet Take 1 tablet by mouth daily TAKE 1 TABLET DAILY Yes SHRAVAN Gomez CNP   ondansetron (ZOFRAN) 4 MG tablet Take 1 tablet by mouth 3 times daily as needed for Nausea or Vomiting Yes Lali Colvin MD   Alirocumab (PRALUENT) 150 MG/ML SOAJ Inject 150 mg into the skin every 14 days Yes Cher Velazquez DO   carvedilol (COREG) 6.25 MG tablet TAKE 1 TABLET TWICE DAILY  WITH MEALS Yes Cher Velazquez DO   sertraline (ZOLOFT) 50 MG tablet Take 1 tablet by mouth daily Yes SHRAVAN Gomez CNP   aspirin 81 MG EC tablet Take 81 mg by mouth Yes Ar Automatic Reconciliation   estradiol (ESTRACE) 0.1 MG/GM vaginal cream Apply 1 gm vaginally nightly x 2 weeks then reduce to 2x/week Yes Ar Automatic Reconciliation   gabapentin (NEURONTIN) 100 MG capsule Take 100 mg by mouth.  As needed Yes Ar Automatic Reconciliation   Meth-Hyo-M Bl-Na Phos-Ph Sal (URIBEL) 118 MG CAPS Take 1 capsule by mouth 4 times daily As needed Yes Ar Automatic Reconciliation   mirabegron (MYRBETRIQ) 25 MG TB24 Take 25 mg by mouth daily Yes Ar Automatic Reconciliation   nitroGLYCERIN (NITROSTAT) 0.4 MG SL tablet Place 0.4 mg under the tongue Yes Ar Automatic Reconciliation       CareTeam (Including outside providers/suppliers regularly involved in providing care):   Patient Care Team:  SHRAVAN Bui CNP as PCP - Ul. SHRAVAN Macias CNP as PCP - Empaneled Provider  Nayana Shah MD as Physician  Elsi Carpio MD as Physician  Lizz Dunham MD as Physician  Valery Freeman DO as Consulting Physician (Internal Medicine Cardiovascular Disease)     Reviewed and updated this visit:  Tobacco  Allergies  Meds  Problems  Med Hx  Surg Hx  Soc Hx  Fam Hx               SHRAVAN Bui CNP

## 2023-04-17 ENCOUNTER — OFFICE VISIT (OUTPATIENT)
Dept: UROLOGY | Age: 68
End: 2023-04-17
Payer: MEDICARE

## 2023-04-17 DIAGNOSIS — N32.81 OVERACTIVE BLADDER: Primary | ICD-10-CM

## 2023-04-17 DIAGNOSIS — N95.2 VAGINAL ATROPHY: ICD-10-CM

## 2023-04-17 DIAGNOSIS — N13.5 OBSTRUCTION OF LEFT URETEROPELVIC JUNCTION (UPJ): ICD-10-CM

## 2023-04-17 LAB
BILIRUBIN, URINE, POC: NEGATIVE
BLOOD URINE, POC: NORMAL
GLUCOSE URINE, POC: NEGATIVE
KETONES, URINE, POC: NEGATIVE
LEUKOCYTE ESTERASE, URINE, POC: NEGATIVE
NITRITE, URINE, POC: NEGATIVE
PH, URINE, POC: 7.5 (ref 4.6–8)
PROTEIN,URINE, POC: NEGATIVE
SPECIFIC GRAVITY, URINE, POC: 1.01 (ref 1–1.03)
URINALYSIS CLARITY, POC: NORMAL
URINALYSIS COLOR, POC: NORMAL
UROBILINOGEN, POC: NORMAL

## 2023-04-17 PROCEDURE — 99214 OFFICE O/P EST MOD 30 MIN: CPT | Performed by: NURSE PRACTITIONER

## 2023-04-17 PROCEDURE — G8420 CALC BMI NORM PARAMETERS: HCPCS | Performed by: NURSE PRACTITIONER

## 2023-04-17 PROCEDURE — 1036F TOBACCO NON-USER: CPT | Performed by: NURSE PRACTITIONER

## 2023-04-17 PROCEDURE — G8427 DOCREV CUR MEDS BY ELIG CLIN: HCPCS | Performed by: NURSE PRACTITIONER

## 2023-04-17 PROCEDURE — 81003 URINALYSIS AUTO W/O SCOPE: CPT | Performed by: NURSE PRACTITIONER

## 2023-04-17 PROCEDURE — 3017F COLORECTAL CA SCREEN DOC REV: CPT | Performed by: NURSE PRACTITIONER

## 2023-04-17 PROCEDURE — 1123F ACP DISCUSS/DSCN MKR DOCD: CPT | Performed by: NURSE PRACTITIONER

## 2023-04-17 PROCEDURE — G8399 PT W/DXA RESULTS DOCUMENT: HCPCS | Performed by: NURSE PRACTITIONER

## 2023-04-17 PROCEDURE — 1090F PRES/ABSN URINE INCON ASSESS: CPT | Performed by: NURSE PRACTITIONER

## 2023-04-17 ASSESSMENT — ENCOUNTER SYMPTOMS
NAUSEA: 0
BACK PAIN: 0

## 2023-04-17 NOTE — PROGRESS NOTES
supple, no significant adenopathy  Chest/Lung-  Quiet, even and easy respiratory effort without use of accessory muscles  Skin - normal coloration and turgor, no rashes      Assessment and Plan    ICD-10-CM    1. Overactive bladder  N32.81 AMB POC URINALYSIS DIP STICK AUTO W/O MICRO      2. Vaginal atrophy  N95.2       3. Obstruction of left ureteropelvic junction (UPJ)  N13.5           L UPJO- stable renal function. Urine normal. Cont to follow sx. Vaginal atrophy- cont estrogen vaginal cream 0.5 mg vaginally 2x/week. OAB- failed enablex and vesicare. I do not recommend anticholinergics d/t prev failure and SE. Discussed Gemtesa vs Myrbetriq vs Botox vs Interstim. She opts to start Myrbetriq 50 mg PO daily. Risks, benefits, and alternatives reviewed. RTO in 4-6 weeks for follow up with me. Advised to call sooner if sx worsen. Maura Torres is supervising physician today and he approves plan of care.

## 2023-04-25 DIAGNOSIS — Z95.810 S/P IMPLANTATION OF AUTOMATIC CARDIOVERTER/DEFIBRILLATOR (AICD): ICD-10-CM

## 2023-04-25 DIAGNOSIS — I44.7 LBBB (LEFT BUNDLE BRANCH BLOCK): ICD-10-CM

## 2023-04-25 DIAGNOSIS — I50.20 SYSTOLIC CONGESTIVE HEART FAILURE (HCC): ICD-10-CM

## 2023-05-01 ENCOUNTER — TELEPHONE (OUTPATIENT)
Dept: UROLOGY | Age: 68
End: 2023-05-01

## 2023-05-01 RX ORDER — ESTRADIOL 0.1 MG/G
1 CREAM VAGINAL
Qty: 42.5 G | Refills: 3 | Status: SHIPPED | OUTPATIENT
Start: 2023-05-01

## 2023-05-01 NOTE — TELEPHONE ENCOUNTER
Pt called stating she needs a prescription of Estradiol cream sent to Chrissy Baker at [facility].

## 2023-05-15 ENCOUNTER — OFFICE VISIT (OUTPATIENT)
Dept: ENDOCRINOLOGY | Age: 68
End: 2023-05-15
Payer: MEDICARE

## 2023-05-15 VITALS
SYSTOLIC BLOOD PRESSURE: 118 MMHG | HEART RATE: 65 BPM | OXYGEN SATURATION: 99 % | DIASTOLIC BLOOD PRESSURE: 74 MMHG | BODY MASS INDEX: 22.92 KG/M2 | WEIGHT: 142 LBS

## 2023-05-15 DIAGNOSIS — E04.2 MULTIPLE THYROID NODULES: Primary | ICD-10-CM

## 2023-05-15 PROCEDURE — G8420 CALC BMI NORM PARAMETERS: HCPCS | Performed by: INTERNAL MEDICINE

## 2023-05-15 PROCEDURE — 99213 OFFICE O/P EST LOW 20 MIN: CPT | Performed by: INTERNAL MEDICINE

## 2023-05-15 PROCEDURE — 1036F TOBACCO NON-USER: CPT | Performed by: INTERNAL MEDICINE

## 2023-05-15 PROCEDURE — 1123F ACP DISCUSS/DSCN MKR DOCD: CPT | Performed by: INTERNAL MEDICINE

## 2023-05-15 PROCEDURE — 3017F COLORECTAL CA SCREEN DOC REV: CPT | Performed by: INTERNAL MEDICINE

## 2023-05-15 PROCEDURE — 1090F PRES/ABSN URINE INCON ASSESS: CPT | Performed by: INTERNAL MEDICINE

## 2023-05-15 PROCEDURE — G8428 CUR MEDS NOT DOCUMENT: HCPCS | Performed by: INTERNAL MEDICINE

## 2023-05-15 PROCEDURE — G8399 PT W/DXA RESULTS DOCUMENT: HCPCS | Performed by: INTERNAL MEDICINE

## 2023-05-15 ASSESSMENT — ENCOUNTER SYMPTOMS
VOICE CHANGE: 0
TROUBLE SWALLOWING: 1

## 2023-05-15 NOTE — PROGRESS NOTES
Kody Giordano MD, [address] [facility] [address]            Reason for visit: Follow-up of thyroid nodules      ASSESSMENT AND PLAN:    1. Multiple thyroid nodules  Prior biopsy of the dominant TR 4 nodule of the left lower pole in November 2019 was benign. Ultrasound was repeated today and is unchanged. I will repeat her ultrasound in 2 years. If stable at that time, ongoing ultrasound follow-up will not be necessary. PROCEDURES:    HEAD AND NECK ULTRASOUND    Date of study:  5/15/2023    Performing/interpreting physician:  Kody Giordano MD, FACE    Indication:  thyroid nodule    Technique:  Using a 12 MHz linear transducer, multiple real-time planar images were obtained of the thyroid and surrounding tissues. Findings:  Right lobe 1.50 x 1.36 x 3.94 cm, isthmus 0.16 cm, left lobe 1.36 x 1.69 x 3.93 cm. Homogeneous echotexture. Normal blood flow. 0.41 x 0.45 x 0.61 cm slightly hypoechoic nodule with macrocalcifications but no microcalcifications or increased blood flow in the midportion of the right lobe (TR 4). 1.14 x 1.21 x 1.35 cm isoechoic nodule without calcifications or increased blood flow in the midportion of the left lobe (TR 3). 0.70 x 0.89 x 1.17 cm isoechoic nodule with macrocalcifications but no microcalcifications or increased blood flow at the left lower pole (TR 4). Impression: Bilateral thyroid nodules as noted. American College of Radiology TI-RADS recommendations:   TR1: Benign, no FNA or ultrasound follow-up   TR2: Nonsuspicious, no FNA or ultrasound follow-up   TR3: Mildly suspicious, FNA if greater than or equal to 2.5 cm, follow with ultrasound at 1, 3,   and 5 years if greater than or equal to 1.5 cm.   TR4: Moderately suspicious, FNA if greater than or equal to 1.5 cm, follow with ultrasound at 1,   2, 3, and 5 years if greater than or equal to 1 cm.    TR5: Highly suspicious, FNA if greater than or equal to 1 cm, follow with

## 2023-05-30 ENCOUNTER — OFFICE VISIT (OUTPATIENT)
Dept: UROLOGY | Age: 68
End: 2023-05-30
Payer: MEDICARE

## 2023-05-30 DIAGNOSIS — N13.5 OBSTRUCTION OF LEFT URETEROPELVIC JUNCTION (UPJ): ICD-10-CM

## 2023-05-30 DIAGNOSIS — N39.0 RECURRENT UTI: ICD-10-CM

## 2023-05-30 DIAGNOSIS — N95.2 VAGINAL ATROPHY: ICD-10-CM

## 2023-05-30 DIAGNOSIS — N32.81 OVERACTIVE BLADDER: Primary | ICD-10-CM

## 2023-05-30 LAB
BILIRUBIN, URINE, POC: NEGATIVE
BLOOD URINE, POC: NEGATIVE
GLUCOSE URINE, POC: NEGATIVE
KETONES, URINE, POC: NEGATIVE
LEUKOCYTE ESTERASE, URINE, POC: NEGATIVE
NITRITE, URINE, POC: NEGATIVE
PH, URINE, POC: 7 (ref 4.6–8)
PROTEIN,URINE, POC: NEGATIVE
SPECIFIC GRAVITY, URINE, POC: 1.01 (ref 1–1.03)
URINALYSIS CLARITY, POC: NORMAL
URINALYSIS COLOR, POC: NORMAL
UROBILINOGEN, POC: NORMAL

## 2023-05-30 PROCEDURE — 81003 URINALYSIS AUTO W/O SCOPE: CPT | Performed by: NURSE PRACTITIONER

## 2023-05-30 PROCEDURE — 1090F PRES/ABSN URINE INCON ASSESS: CPT | Performed by: NURSE PRACTITIONER

## 2023-05-30 PROCEDURE — 99214 OFFICE O/P EST MOD 30 MIN: CPT | Performed by: NURSE PRACTITIONER

## 2023-05-30 PROCEDURE — 1036F TOBACCO NON-USER: CPT | Performed by: NURSE PRACTITIONER

## 2023-05-30 PROCEDURE — 1123F ACP DISCUSS/DSCN MKR DOCD: CPT | Performed by: NURSE PRACTITIONER

## 2023-05-30 PROCEDURE — G8420 CALC BMI NORM PARAMETERS: HCPCS | Performed by: NURSE PRACTITIONER

## 2023-05-30 PROCEDURE — G8427 DOCREV CUR MEDS BY ELIG CLIN: HCPCS | Performed by: NURSE PRACTITIONER

## 2023-05-30 PROCEDURE — 3017F COLORECTAL CA SCREEN DOC REV: CPT | Performed by: NURSE PRACTITIONER

## 2023-05-30 PROCEDURE — G8399 PT W/DXA RESULTS DOCUMENT: HCPCS | Performed by: NURSE PRACTITIONER

## 2023-05-30 ASSESSMENT — ENCOUNTER SYMPTOMS
NAUSEA: 0
BACK PAIN: 0

## 2023-05-30 NOTE — PROGRESS NOTES
[facility]  [address]    [address], [address]  [phone]          Delaine Mortimer  : 1955    Chief Complaint   Patient presents with    Follow-up          HPI     Delaine Mortimer is a 69 y.o. female who is followed for a L UPJO, OAB, recurrent UTI, and vaginal atrophy. Pt reports being diagnosed with this in mid 29's and elected for observation. This was rediscovered again after a recent fall and sacral fx. CT on 3/22/22 shows a L UPJO with likely crossing vessels. MAG-3 completed on 22 shows a half time lasix washout of greater than 30min with the L kidney contributing 45% of renal function. Stable renal function. No flank pain. She and Dr Delvis Montano have elected for no surgical intervention. Also has h/o OAB. Prev followed by Dr Krupa Salmeron. Failed to improve w Enablex and Vesicare. Also had dry mouth w medications. Only mild improvement w pelvic floor therapy. Cont to struggle w daytime frequency of multiple times per hour, nocturia x 4x/night, and SIGNIFICANT UUI. Wears a pad at all times. She did not try the Myrbetriq samples she was given in . Started Myrbetriq 2023. Sign relief of OAB. Prev on premarin cream for vaginal atrophy. This was restarted in . Prev h/o recurrent UTI. No problem now. No h/o renal stones.           Past Medical History:   Diagnosis Date    Anxiety disorder     Basal cell carcinoma of skin     Chronic insomnia     Coronary artery disease     COVID-19 2020    mild case     Dyslipidemia     Fibromyalgia     Gastroesophageal reflux disease     Interstitial cystitis     Irritable bowel syndrome     Ischemic colitis ([address] 2014    Multiple thyroid nodules     S/P thyroid bx 2019-benign    Osteopenia     Overactive bladder     Squamous cell carcinoma of skin     Systolic congestive heart failure ([address]      Past Surgical History:   Procedure Laterality Date    BREAST BIOPSY Left     BREAST SURGERY Bilateral

## 2023-06-06 ENCOUNTER — TELEPHONE (OUTPATIENT)
Dept: INTERNAL MEDICINE CLINIC | Facility: CLINIC | Age: 68
End: 2023-06-06

## 2023-06-06 DIAGNOSIS — R11.2 NAUSEA AND VOMITING, UNSPECIFIED VOMITING TYPE: Primary | ICD-10-CM

## 2023-06-06 RX ORDER — PROCHLORPERAZINE 25 MG
25 SUPPOSITORY, RECTAL RECTAL EVERY 12 HOURS PRN
Qty: 14 SUPPOSITORY | Refills: 0 | Status: SHIPPED | OUTPATIENT
Start: 2023-06-06

## 2023-06-06 NOTE — TELEPHONE ENCOUNTER
Patient's  called, Santiago Neumann(Emergency Contact), stating that the patient took ondansetron at 4 this morning and has been vomiting profusely since then and can not keep anything down. The ondansetron was sent in by the ER back in January. He is asking for a suppository for her? I did let him know that if she did get any worse to go to the ER and he stated that he just wanted something called in.  I did let him know that Bradly was out of the office and would send a message to the covering provider for the day- Angy

## 2023-06-20 DIAGNOSIS — I25.10 CORONARY ARTERY DISEASE INVOLVING NATIVE CORONARY ARTERY OF NATIVE HEART WITHOUT ANGINA PECTORIS: ICD-10-CM

## 2023-06-20 DIAGNOSIS — I50.22 CHRONIC SYSTOLIC CONGESTIVE HEART FAILURE (HCC): ICD-10-CM

## 2023-06-20 DIAGNOSIS — I25.10 CORONARY ARTERY DISEASE INVOLVING NATIVE CORONARY ARTERY OF NATIVE HEART WITHOUT ANGINA PECTORIS: Primary | ICD-10-CM

## 2023-06-20 DIAGNOSIS — I42.0 DILATED CARDIOMYOPATHY (HCC): ICD-10-CM

## 2023-06-20 LAB
ALBUMIN SERPL-MCNC: 3.6 G/DL (ref 3.2–4.6)
ALBUMIN/GLOB SERPL: 1.2 (ref 0.4–1.6)
ALP SERPL-CCNC: 61 U/L (ref 50–136)
ALT SERPL-CCNC: 27 U/L (ref 12–65)
ANION GAP SERPL CALC-SCNC: 1 MMOL/L (ref 2–11)
AST SERPL-CCNC: 25 U/L (ref 15–37)
BILIRUB SERPL-MCNC: 0.3 MG/DL (ref 0.2–1.1)
BUN SERPL-MCNC: 16 MG/DL (ref 8–23)
CALCIUM SERPL-MCNC: 9 MG/DL (ref 8.3–10.4)
CHLORIDE SERPL-SCNC: 110 MMOL/L (ref 101–110)
CHOLEST SERPL-MCNC: 114 MG/DL
CO2 SERPL-SCNC: 30 MMOL/L (ref 21–32)
CREAT SERPL-MCNC: 0.7 MG/DL (ref 0.6–1)
GLOBULIN SER CALC-MCNC: 3 G/DL (ref 2.8–4.5)
GLUCOSE SERPL-MCNC: 90 MG/DL (ref 65–100)
HDLC SERPL-MCNC: 43 MG/DL (ref 40–60)
HDLC SERPL: 2.7
LDLC SERPL CALC-MCNC: 46 MG/DL
POTASSIUM SERPL-SCNC: 4.1 MMOL/L (ref 3.5–5.1)
PROT SERPL-MCNC: 6.6 G/DL (ref 6.3–8.2)
SODIUM SERPL-SCNC: 141 MMOL/L (ref 133–143)
TRIGL SERPL-MCNC: 125 MG/DL (ref 35–150)
VLDLC SERPL CALC-MCNC: 25 MG/DL (ref 6–23)

## 2023-06-27 ENCOUNTER — OFFICE VISIT (OUTPATIENT)
Age: 68
End: 2023-06-27
Payer: MEDICARE

## 2023-06-27 ENCOUNTER — NURSE ONLY (OUTPATIENT)
Age: 68
End: 2023-06-27

## 2023-06-27 VITALS
HEART RATE: 60 BPM | DIASTOLIC BLOOD PRESSURE: 72 MMHG | HEIGHT: 66 IN | BODY MASS INDEX: 22.4 KG/M2 | WEIGHT: 139.4 LBS | SYSTOLIC BLOOD PRESSURE: 128 MMHG

## 2023-06-27 DIAGNOSIS — I44.7 LBBB (LEFT BUNDLE BRANCH BLOCK): ICD-10-CM

## 2023-06-27 DIAGNOSIS — I25.10 CORONARY ARTERY DISEASE INVOLVING NATIVE CORONARY ARTERY OF NATIVE HEART WITHOUT ANGINA PECTORIS: ICD-10-CM

## 2023-06-27 DIAGNOSIS — I50.22 CHRONIC SYSTOLIC CONGESTIVE HEART FAILURE (HCC): Primary | ICD-10-CM

## 2023-06-27 DIAGNOSIS — Z95.810 S/P IMPLANTATION OF AUTOMATIC CARDIOVERTER/DEFIBRILLATOR (AICD): ICD-10-CM

## 2023-06-27 DIAGNOSIS — I42.0 DILATED CARDIOMYOPATHY (HCC): Primary | ICD-10-CM

## 2023-06-27 DIAGNOSIS — I42.0 DILATED CARDIOMYOPATHY (HCC): ICD-10-CM

## 2023-06-27 PROCEDURE — G8399 PT W/DXA RESULTS DOCUMENT: HCPCS | Performed by: INTERNAL MEDICINE

## 2023-06-27 PROCEDURE — 1123F ACP DISCUSS/DSCN MKR DOCD: CPT | Performed by: INTERNAL MEDICINE

## 2023-06-27 PROCEDURE — 3017F COLORECTAL CA SCREEN DOC REV: CPT | Performed by: INTERNAL MEDICINE

## 2023-06-27 PROCEDURE — 1090F PRES/ABSN URINE INCON ASSESS: CPT | Performed by: INTERNAL MEDICINE

## 2023-06-27 PROCEDURE — G8420 CALC BMI NORM PARAMETERS: HCPCS | Performed by: INTERNAL MEDICINE

## 2023-06-27 PROCEDURE — 99214 OFFICE O/P EST MOD 30 MIN: CPT | Performed by: INTERNAL MEDICINE

## 2023-06-27 PROCEDURE — G8428 CUR MEDS NOT DOCUMENT: HCPCS | Performed by: INTERNAL MEDICINE

## 2023-06-27 PROCEDURE — 1036F TOBACCO NON-USER: CPT | Performed by: INTERNAL MEDICINE

## 2023-07-19 RX ORDER — CARVEDILOL 6.25 MG/1
TABLET ORAL
Qty: 180 TABLET | Refills: 3 | Status: SHIPPED | OUTPATIENT
Start: 2023-07-19

## 2023-07-25 ENCOUNTER — TELEPHONE (OUTPATIENT)
Age: 68
End: 2023-07-25

## 2023-07-25 NOTE — TELEPHONE ENCOUNTER
----- Message from Traci Davis DO sent at 7/25/2023  2:22 PM EDT -----  Please call the patient. NST was ok, nothing major or concerning. If having more angina (worsening LARIOS, CP, SOB), please let us know. Will review more at their follow up appointment and get plan for the future.     Thanks,   Perry Hassan

## 2023-08-01 LAB
LEFT VENTRICULAR EJECTION FRACTION HIGH VALUE: 55 %
LEFT VENTRICULAR EJECTION FRACTION MODE: NORMAL
LV EF: 50 %

## 2023-08-02 ENCOUNTER — TELEPHONE (OUTPATIENT)
Age: 68
End: 2023-08-02

## 2023-08-02 NOTE — TELEPHONE ENCOUNTER
----- Message from Micheline Hardin DO sent at 8/1/2023  1:37 PM EDT -----  Please call the patient. CAROTID US was ok, nothing major or concerning, NO BAD blockages SEEN on the study. Keep regular appointment time, no med changes. Will review more at follow up and get plan for the future.     Thanks,   John Aviles

## 2023-08-14 ENCOUNTER — OFFICE VISIT (OUTPATIENT)
Age: 68
End: 2023-08-14
Payer: MEDICARE

## 2023-08-14 VITALS
DIASTOLIC BLOOD PRESSURE: 70 MMHG | HEART RATE: 64 BPM | HEIGHT: 66 IN | SYSTOLIC BLOOD PRESSURE: 132 MMHG | WEIGHT: 143.1 LBS | BODY MASS INDEX: 23 KG/M2

## 2023-08-14 DIAGNOSIS — I42.0 DILATED CARDIOMYOPATHY (HCC): ICD-10-CM

## 2023-08-14 DIAGNOSIS — Z95.810 S/P IMPLANTATION OF AUTOMATIC CARDIOVERTER/DEFIBRILLATOR (AICD): Primary | ICD-10-CM

## 2023-08-14 DIAGNOSIS — I50.22 CHRONIC SYSTOLIC CONGESTIVE HEART FAILURE (HCC): ICD-10-CM

## 2023-08-14 DIAGNOSIS — I44.7 LBBB (LEFT BUNDLE BRANCH BLOCK): ICD-10-CM

## 2023-08-14 DIAGNOSIS — I25.10 CORONARY ARTERY DISEASE INVOLVING NATIVE CORONARY ARTERY OF NATIVE HEART WITHOUT ANGINA PECTORIS: ICD-10-CM

## 2023-08-14 DIAGNOSIS — Z95.1 S/P CABG X 3: ICD-10-CM

## 2023-08-14 PROCEDURE — G8420 CALC BMI NORM PARAMETERS: HCPCS | Performed by: INTERNAL MEDICINE

## 2023-08-14 PROCEDURE — G8399 PT W/DXA RESULTS DOCUMENT: HCPCS | Performed by: INTERNAL MEDICINE

## 2023-08-14 PROCEDURE — 1090F PRES/ABSN URINE INCON ASSESS: CPT | Performed by: INTERNAL MEDICINE

## 2023-08-14 PROCEDURE — 1123F ACP DISCUSS/DSCN MKR DOCD: CPT | Performed by: INTERNAL MEDICINE

## 2023-08-14 PROCEDURE — 99214 OFFICE O/P EST MOD 30 MIN: CPT | Performed by: INTERNAL MEDICINE

## 2023-08-14 PROCEDURE — G8428 CUR MEDS NOT DOCUMENT: HCPCS | Performed by: INTERNAL MEDICINE

## 2023-08-14 PROCEDURE — 3017F COLORECTAL CA SCREEN DOC REV: CPT | Performed by: INTERNAL MEDICINE

## 2023-08-14 PROCEDURE — 1036F TOBACCO NON-USER: CPT | Performed by: INTERNAL MEDICINE

## 2023-08-14 NOTE — PROGRESS NOTES
11/28/2022    LABVLDL 24.8 (H) 05/24/2022    VLDL 25 05/18/2021    VLDL 28 01/12/2021     Lab Results   Component Value Date    CHOLHDLRATIO 2.7 06/20/2023    CHOLHDLRATIO 3.2 11/28/2022    CHOLHDLRATIO 2.7 05/24/2022           I have Independently reviewed prior care notes, any ER records available, cardiac testing, labs and results with the patient and before seeing the patient today. Also independently reviewed outside records when available. ASSESSMENT:    Jil Adler was seen today for results, coronary artery disease and congestive heart failure. Diagnoses and all orders for this visit:    S/P implantation of automatic cardioverter/defibrillator (AICD)    Chronic systolic congestive heart failure (HCC)    LBBB (left bundle branch block)    Dilated cardiomyopathy ([address])    Coronary artery disease involving native coronary artery of native heart without angina pectoris    S/P CABG x 3          PLAN:      1.  HPL:  on praluent now.  labs better, follow trig. 2. CAD s/p CABG:   EF normal, NST ok. Patient presents for followup of recent cardiac stress testing. The stress test showed normal LV function with no evidence of ischemia. We discussed the reassuring results of the stress test.  We also discused the importance of ongoing risk factor modification for the prevention of future cardiovascular events. A healthy lifestyle was discussed. This would include heart-healthy diet, regular aerobic exercises, maintenance of desirable body weight and avoidance of tobacco products  Patient is encouraged to contact the office with any recurrent symptoms or concerns as reviewed today. All questions were answered with the patient voicing understanding. The patient has been instructed to call with any angina or equivalent as reviewed today. All questions were answered with the patient voicing complete understanding. 3. SHF: remain on coreg, no Ace with low BP in the past. Follow, EF better now.

## 2023-09-07 PROCEDURE — 93295 DEV INTERROG REMOTE 1/2/MLT: CPT | Performed by: INTERNAL MEDICINE

## 2023-09-07 PROCEDURE — 93296 REM INTERROG EVL PM/IDS: CPT | Performed by: INTERNAL MEDICINE

## 2023-09-18 ENCOUNTER — OFFICE VISIT (OUTPATIENT)
Dept: INTERNAL MEDICINE CLINIC | Facility: CLINIC | Age: 68
End: 2023-09-18
Payer: MEDICARE

## 2023-09-18 VITALS
OXYGEN SATURATION: 98 % | DIASTOLIC BLOOD PRESSURE: 63 MMHG | BODY MASS INDEX: 22.98 KG/M2 | HEART RATE: 69 BPM | HEIGHT: 66 IN | TEMPERATURE: 97.4 F | SYSTOLIC BLOOD PRESSURE: 120 MMHG | WEIGHT: 143 LBS

## 2023-09-18 DIAGNOSIS — M85.89 OSTEOPENIA OF MULTIPLE SITES: ICD-10-CM

## 2023-09-18 DIAGNOSIS — I50.22 CHRONIC SYSTOLIC CONGESTIVE HEART FAILURE (HCC): ICD-10-CM

## 2023-09-18 DIAGNOSIS — Z95.1 S/P CABG X 3: ICD-10-CM

## 2023-09-18 DIAGNOSIS — K21.9 GASTROESOPHAGEAL REFLUX DISEASE, UNSPECIFIED WHETHER ESOPHAGITIS PRESENT: ICD-10-CM

## 2023-09-18 DIAGNOSIS — N30.10 INTERSTITIAL CYSTITIS: ICD-10-CM

## 2023-09-18 DIAGNOSIS — E04.2 MULTIPLE THYROID NODULES: ICD-10-CM

## 2023-09-18 DIAGNOSIS — N32.81 OVERACTIVE BLADDER: ICD-10-CM

## 2023-09-18 DIAGNOSIS — I25.10 CORONARY ARTERY DISEASE INVOLVING NATIVE CORONARY ARTERY OF NATIVE HEART WITHOUT ANGINA PECTORIS: Primary | ICD-10-CM

## 2023-09-18 PROCEDURE — 99214 OFFICE O/P EST MOD 30 MIN: CPT | Performed by: NURSE PRACTITIONER

## 2023-09-18 PROCEDURE — 1090F PRES/ABSN URINE INCON ASSESS: CPT | Performed by: NURSE PRACTITIONER

## 2023-09-18 PROCEDURE — 3017F COLORECTAL CA SCREEN DOC REV: CPT | Performed by: NURSE PRACTITIONER

## 2023-09-18 PROCEDURE — G8427 DOCREV CUR MEDS BY ELIG CLIN: HCPCS | Performed by: NURSE PRACTITIONER

## 2023-09-18 PROCEDURE — 1036F TOBACCO NON-USER: CPT | Performed by: NURSE PRACTITIONER

## 2023-09-18 PROCEDURE — 1123F ACP DISCUSS/DSCN MKR DOCD: CPT | Performed by: NURSE PRACTITIONER

## 2023-09-18 PROCEDURE — G8420 CALC BMI NORM PARAMETERS: HCPCS | Performed by: NURSE PRACTITIONER

## 2023-09-18 PROCEDURE — G8399 PT W/DXA RESULTS DOCUMENT: HCPCS | Performed by: NURSE PRACTITIONER

## 2023-09-18 RX ORDER — PANTOPRAZOLE SODIUM 40 MG/1
40 TABLET, DELAYED RELEASE ORAL DAILY
Qty: 90 TABLET | Refills: 1 | Status: SHIPPED | OUTPATIENT
Start: 2023-09-18

## 2023-09-18 SDOH — ECONOMIC STABILITY: FOOD INSECURITY: WITHIN THE PAST 12 MONTHS, YOU WORRIED THAT YOUR FOOD WOULD RUN OUT BEFORE YOU GOT MONEY TO BUY MORE.: NEVER TRUE

## 2023-09-18 SDOH — ECONOMIC STABILITY: INCOME INSECURITY: HOW HARD IS IT FOR YOU TO PAY FOR THE VERY BASICS LIKE FOOD, HOUSING, MEDICAL CARE, AND HEATING?: NOT HARD AT ALL

## 2023-09-18 SDOH — ECONOMIC STABILITY: FOOD INSECURITY: WITHIN THE PAST 12 MONTHS, THE FOOD YOU BOUGHT JUST DIDN'T LAST AND YOU DIDN'T HAVE MONEY TO GET MORE.: NEVER TRUE

## 2023-09-18 SDOH — ECONOMIC STABILITY: HOUSING INSECURITY
IN THE LAST 12 MONTHS, WAS THERE A TIME WHEN YOU DID NOT HAVE A STEADY PLACE TO SLEEP OR SLEPT IN A SHELTER (INCLUDING NOW)?: NO

## 2023-09-18 ASSESSMENT — ENCOUNTER SYMPTOMS
VOMITING: 0
ABDOMINAL PAIN: 0
SHORTNESS OF BREATH: 0
COUGH: 0
DIARRHEA: 0
WHEEZING: 0
NAUSEA: 0

## 2023-09-18 NOTE — PROGRESS NOTES
bladder    Interstitial cystitis    Gastroesophageal reflux disease, unspecified whether esophagitis present  Comments:  stable   Orders:  -     pantoprazole (PROTONIX) 40 MG tablet; Take 1 tablet by mouth daily TAKE 1 TABLET DAILY      Patient states she is otherwise doing well; has no further questions or concerns at this visit. Encouraged to contact office with any concerns prior to next visit. Return in about 6 months (around 3/18/2024), or if symptoms worsen or fail to improve, for MWV, Follow up, Fasting labs.     SHRAVAN Davis - CNP

## 2023-09-20 DIAGNOSIS — I44.7 LBBB (LEFT BUNDLE BRANCH BLOCK): ICD-10-CM

## 2023-09-20 DIAGNOSIS — I50.20 SYSTOLIC CONGESTIVE HEART FAILURE (HCC): ICD-10-CM

## 2023-09-20 DIAGNOSIS — Z95.810 S/P IMPLANTATION OF AUTOMATIC CARDIOVERTER/DEFIBRILLATOR (AICD): ICD-10-CM

## 2023-11-01 ENCOUNTER — TRANSCRIBE ORDERS (OUTPATIENT)
Dept: SCHEDULING | Age: 68
End: 2023-11-01

## 2023-11-01 DIAGNOSIS — Z12.31 SCREENING MAMMOGRAM FOR BREAST CANCER: Primary | ICD-10-CM

## 2023-12-12 ENCOUNTER — HOSPITAL ENCOUNTER (OUTPATIENT)
Dept: MAMMOGRAPHY | Age: 68
Discharge: HOME | End: 2023-12-15
Payer: MEDICARE

## 2023-12-12 DIAGNOSIS — Z12.31 SCREENING MAMMOGRAM FOR BREAST CANCER: ICD-10-CM

## 2023-12-12 PROCEDURE — 93295 DEV INTERROG REMOTE 1/2/MLT: CPT | Performed by: INTERNAL MEDICINE

## 2023-12-12 PROCEDURE — 93296 REM INTERROG EVL PM/IDS: CPT | Performed by: INTERNAL MEDICINE

## 2023-12-12 PROCEDURE — 77063 BREAST TOMOSYNTHESIS BI: CPT

## 2024-02-06 DIAGNOSIS — I50.22 CHRONIC SYSTOLIC CONGESTIVE HEART FAILURE (HCC): ICD-10-CM

## 2024-02-06 DIAGNOSIS — I25.10 CORONARY ARTERY DISEASE INVOLVING NATIVE CORONARY ARTERY OF NATIVE HEART WITHOUT ANGINA PECTORIS: ICD-10-CM

## 2024-02-06 DIAGNOSIS — I50.22 CHRONIC SYSTOLIC CONGESTIVE HEART FAILURE (HCC): Primary | ICD-10-CM

## 2024-02-06 LAB
ALBUMIN SERPL-MCNC: 3.6 G/DL (ref 3.2–4.6)
ALBUMIN/GLOB SERPL: 1.2 (ref 0.4–1.6)
ALP SERPL-CCNC: 68 U/L (ref 50–136)
ALT SERPL-CCNC: 25 U/L (ref 12–65)
ANION GAP SERPL CALC-SCNC: 2 MMOL/L (ref 2–11)
AST SERPL-CCNC: 23 U/L (ref 15–37)
BILIRUB SERPL-MCNC: 0.2 MG/DL (ref 0.2–1.1)
BUN SERPL-MCNC: 16 MG/DL (ref 8–23)
CALCIUM SERPL-MCNC: 9.2 MG/DL (ref 8.3–10.4)
CHLORIDE SERPL-SCNC: 110 MMOL/L (ref 103–113)
CHOLEST SERPL-MCNC: 141 MG/DL
CO2 SERPL-SCNC: 30 MMOL/L (ref 21–32)
CREAT SERPL-MCNC: 0.7 MG/DL (ref 0.6–1)
GLOBULIN SER CALC-MCNC: 3.1 G/DL (ref 2.8–4.5)
GLUCOSE SERPL-MCNC: 91 MG/DL (ref 65–100)
HDLC SERPL-MCNC: 54 MG/DL (ref 40–60)
HDLC SERPL: 2.6
LDLC SERPL CALC-MCNC: 67.8 MG/DL
POTASSIUM SERPL-SCNC: 4.3 MMOL/L (ref 3.5–5.1)
PROT SERPL-MCNC: 6.7 G/DL (ref 6.3–8.2)
SODIUM SERPL-SCNC: 142 MMOL/L (ref 136–146)
TRIGL SERPL-MCNC: 96 MG/DL (ref 35–150)
VLDLC SERPL CALC-MCNC: 19.2 MG/DL (ref 6–23)

## 2024-02-13 ENCOUNTER — NURSE ONLY (OUTPATIENT)
Age: 69
End: 2024-02-13

## 2024-02-13 ENCOUNTER — OFFICE VISIT (OUTPATIENT)
Age: 69
End: 2024-02-13

## 2024-02-13 VITALS
DIASTOLIC BLOOD PRESSURE: 70 MMHG | WEIGHT: 142 LBS | BODY MASS INDEX: 22.82 KG/M2 | SYSTOLIC BLOOD PRESSURE: 110 MMHG | HEIGHT: 66 IN | HEART RATE: 84 BPM

## 2024-02-13 DIAGNOSIS — I42.0 DILATED CARDIOMYOPATHY (HCC): ICD-10-CM

## 2024-02-13 DIAGNOSIS — Z95.810 S/P IMPLANTATION OF AUTOMATIC CARDIOVERTER/DEFIBRILLATOR (AICD): ICD-10-CM

## 2024-02-13 DIAGNOSIS — I44.7 LBBB (LEFT BUNDLE BRANCH BLOCK): ICD-10-CM

## 2024-02-13 DIAGNOSIS — I50.22 CHRONIC SYSTOLIC CONGESTIVE HEART FAILURE (HCC): Primary | ICD-10-CM

## 2024-02-13 DIAGNOSIS — I25.10 CORONARY ARTERY DISEASE INVOLVING NATIVE CORONARY ARTERY OF NATIVE HEART WITHOUT ANGINA PECTORIS: ICD-10-CM

## 2024-02-13 NOTE — PROGRESS NOTES
[address] [facility], [place] [address]  PHONE: [phone]     24    NAME:  Vickie Neumann  : 1955  MRN: [id number]       SUBJECTIVE:   Vickie Neumann is a 68 y.o. female seen for a follow up visit regarding the following:     Chief Complaint   Patient presents with    Congestive Heart Failure       HPI:   18:  CABG X 3 with LIMA to LAD, sequential SVG to Ramus and OM.    EF 30-35% before the CABG.     Echo 2019: EF 35-40%.    St. Ahmet biventricular ICD on 18.   Echo 10/2019: EF 50%  Echo 2023: EF 50-55%, mild MR  Carotid US 2023: mild Dz.   NST 2023: low risk of cardiac events.        Feeling better, walking 6 miles per day.  No angina.  Better now.     NO edema.    NO new edema, palp.    NO pressure in chest.  No LARIOS.   Patient denies recent history of orthopnea, PND, excessive dizziness and/or syncope.     Off lipitor.          Past Medical History, Past Surgical History, Family history, Social History, and Medications were all reviewed with the patient today and updated as necessary.     Current Outpatient Medications   Medication Sig Dispense Refill    pantoprazole (PROTONIX) 40 MG tablet Take 1 tablet by mouth daily TAKE 1 TABLET DAILY 90 tablet 1    carvedilol (COREG) 6.25 MG tablet TAKE 1 TABLET TWICE DAILY  WITH MEALS 180 tablet 3    prochlorperazine (COMPRO) 25 MG suppository Place 1 suppository rectally every 12 hours as needed for Nausea 14 suppository 0    estradiol (ESTRACE) 0.1 MG/GM vaginal cream Place 1 g vaginally Twice a Week 42.5 g 3    ondansetron (ZOFRAN) 4 MG tablet Take 1 tablet by mouth 3 times daily as needed for Nausea or Vomiting 12 tablet 0    Alirocumab (PRALUENT) 150 MG/ML SOAJ Inject 150 mg into the skin every 14 days 6 Adjustable Dose Pre-filled Pen Syringe 3    aspirin 81 MG EC tablet Take 1 tablet by mouth      Meth-Hyo-M Bl-Na Phos-Ph Sal (URIBEL) 118 MG CAPS Take 1 capsule by mouth 4 times daily As needed

## 2024-02-26 ENCOUNTER — TELEPHONE (OUTPATIENT)
Age: 69
End: 2024-02-26

## 2024-03-01 NOTE — TELEPHONE ENCOUNTER
Repatha SureClick 140MG/ML auto-injectors    PA submitted.     Your request has been approved. Authorization Expiration Date: March 1, 2025.    Left message with information above.

## 2024-03-12 ENCOUNTER — APPOINTMENT (OUTPATIENT)
Dept: GENERAL RADIOLOGY | Age: 69
End: 2024-03-12
Payer: MEDICARE

## 2024-03-12 ENCOUNTER — HOSPITAL ENCOUNTER (EMERGENCY)
Age: 69
Discharge: HOME | End: 2024-03-12
Attending: EMERGENCY MEDICINE
Payer: MEDICARE

## 2024-03-12 VITALS
HEART RATE: 69 BPM | TEMPERATURE: 97.3 F | DIASTOLIC BLOOD PRESSURE: 73 MMHG | SYSTOLIC BLOOD PRESSURE: 134 MMHG | RESPIRATION RATE: 18 BRPM | OXYGEN SATURATION: 94 %

## 2024-03-12 DIAGNOSIS — S61.411A LACERATION OF RIGHT HAND, FOREIGN BODY PRESENCE UNSPECIFIED, INITIAL ENCOUNTER: Primary | ICD-10-CM

## 2024-03-12 PROCEDURE — 6360000002 HC RX W HCPCS: Performed by: EMERGENCY MEDICINE

## 2024-03-12 PROCEDURE — 73130 X-RAY EXAM OF HAND: CPT

## 2024-03-12 PROCEDURE — 2500000003 HC RX 250 WO HCPCS: Performed by: EMERGENCY MEDICINE

## 2024-03-12 PROCEDURE — 96372 THER/PROPH/DIAG INJ SC/IM: CPT

## 2024-03-12 PROCEDURE — 90714 TD VACC NO PRESV 7 YRS+ IM: CPT | Performed by: EMERGENCY MEDICINE

## 2024-03-12 PROCEDURE — 12002 RPR S/N/AX/GEN/TRNK2.6-7.5CM: CPT

## 2024-03-12 PROCEDURE — 90471 IMMUNIZATION ADMIN: CPT | Performed by: EMERGENCY MEDICINE

## 2024-03-12 PROCEDURE — 73110 X-RAY EXAM OF WRIST: CPT

## 2024-03-12 PROCEDURE — 99284 EMERGENCY DEPT VISIT MOD MDM: CPT

## 2024-03-12 PROCEDURE — 2580000003 HC RX 258: Performed by: EMERGENCY MEDICINE

## 2024-03-12 PROCEDURE — 2500000003 HC RX 250 WO HCPCS

## 2024-03-12 RX ORDER — TETANUS AND DIPHTHERIA TOXOIDS ADSORBED 2; 2 [LF]/.5ML; [LF]/.5ML
0.5 INJECTION INTRAMUSCULAR
Status: COMPLETED | OUTPATIENT
Start: 2024-03-12 | End: 2024-03-12

## 2024-03-12 RX ORDER — LIDOCAINE HYDROCHLORIDE 20 MG/ML
5 INJECTION, SOLUTION INFILTRATION; PERINEURAL ONCE
Status: DISCONTINUED | OUTPATIENT
Start: 2024-03-12 | End: 2024-03-12

## 2024-03-12 RX ORDER — CLINDAMYCIN HYDROCHLORIDE 300 MG/1
300 CAPSULE ORAL 4 TIMES DAILY
Qty: 40 CAPSULE | Refills: 0 | Status: SHIPPED | OUTPATIENT
Start: 2024-03-12 | End: 2024-03-22

## 2024-03-12 RX ORDER — LIDOCAINE HYDROCHLORIDE 10 MG/ML
INJECTION, SOLUTION INFILTRATION; PERINEURAL
Status: COMPLETED
Start: 2024-03-12 | End: 2024-03-12

## 2024-03-12 RX ORDER — LIDOCAINE HYDROCHLORIDE 10 MG/ML
10 INJECTION, SOLUTION INFILTRATION; PERINEURAL ONCE
Status: COMPLETED | OUTPATIENT
Start: 2024-03-12 | End: 2024-03-12

## 2024-03-12 RX ORDER — LIDOCAINE HYDROCHLORIDE 10 MG/ML
10 INJECTION, SOLUTION EPIDURAL; INFILTRATION; INTRACAUDAL; PERINEURAL ONCE
Status: DISCONTINUED | OUTPATIENT
Start: 2024-03-12 | End: 2024-03-12

## 2024-03-12 RX ADMIN — LIDOCAINE HYDROCHLORIDE 10 ML: 10 INJECTION, SOLUTION INFILTRATION; PERINEURAL at 15:53

## 2024-03-12 RX ADMIN — CEFAZOLIN 1000 MG: 1 INJECTION, POWDER, FOR SOLUTION INTRAMUSCULAR; INTRAVENOUS at 15:40

## 2024-03-12 RX ADMIN — LIDOCAINE HYDROCHLORIDE 10 ML: 10 INJECTION, SOLUTION INFILTRATION; PERINEURAL at 15:45

## 2024-03-12 RX ADMIN — TETANUS AND DIPHTHERIA TOXOIDS ADSORBED 0.5 ML: 2; 2 INJECTION INTRAMUSCULAR at 15:39

## 2024-03-12 ASSESSMENT — PAIN DESCRIPTION - DESCRIPTORS: DESCRIPTORS: THROBBING

## 2024-03-12 ASSESSMENT — PAIN DESCRIPTION - LOCATION: LOCATION: WRIST

## 2024-03-12 ASSESSMENT — PAIN DESCRIPTION - ORIENTATION: ORIENTATION: RIGHT

## 2024-03-12 ASSESSMENT — PAIN SCALES - GENERAL: PAINLEVEL_OUTOF10: 6

## 2024-03-12 NOTE — ED TRIAGE NOTES
Pt reports mechanical fall on concrete and landed on right wrist and left knee.  Pt reports right wrist pain and skin flap is present with blood.

## 2024-03-12 NOTE — DISCHARGE INSTRUCTIONS
Please return and 12 days for suture removal.  Return immediately for any redness warmth or drainage from the area.  We have provided antibiotics for the next 7 days in an attempt to prevent infection as well as he received an antibiotic injection while in the emergency department.

## 2024-03-12 NOTE — ED PROVIDER NOTES
is not perfect and grammatical and other typographical errors may be present.  This note has not been completely proofread for errors.      Phuong Macias MD  03/12/24 [id number]

## 2024-03-12 NOTE — ED NOTES
I have reviewed discharge instructions with the patient.  The patient verbalized understanding.    Patient left ED via Discharge Method: ambulatory to Home with (Self).    Opportunity for questions and clarification provided.       Patient given 1 scripts.         To continue your aftercare when you leave the hospital, you may receive an automated call from our care team to check in on how you are doing.  This is a free service and part of our promise to provide the best care and service to meet your aftercare needs.” If you have questions, or wish to unsubscribe from this service please call [phone].  Thank you for Choosing our [facility] Emergency Department.        Shimon He RN  03/12/24 0927

## 2024-03-15 SDOH — HEALTH STABILITY: PHYSICAL HEALTH: ON AVERAGE, HOW MANY DAYS PER WEEK DO YOU ENGAGE IN MODERATE TO STRENUOUS EXERCISE (LIKE A BRISK WALK)?: 7 DAYS

## 2024-03-15 SDOH — HEALTH STABILITY: PHYSICAL HEALTH: ON AVERAGE, HOW MANY MINUTES DO YOU ENGAGE IN EXERCISE AT THIS LEVEL?: 30 MIN

## 2024-03-15 ASSESSMENT — LIFESTYLE VARIABLES
HOW MANY STANDARD DRINKS CONTAINING ALCOHOL DO YOU HAVE ON A TYPICAL DAY: PATIENT DOES NOT DRINK
HOW OFTEN DO YOU HAVE SIX OR MORE DRINKS ON ONE OCCASION: 1
HOW MANY STANDARD DRINKS CONTAINING ALCOHOL DO YOU HAVE ON A TYPICAL DAY: 0

## 2024-03-15 ASSESSMENT — PATIENT HEALTH QUESTIONNAIRE - PHQ9
SUM OF ALL RESPONSES TO PHQ9 QUESTIONS 1 & 2: 0
SUM OF ALL RESPONSES TO PHQ QUESTIONS 1-9: 0
SUM OF ALL RESPONSES TO PHQ QUESTIONS 1-9: 0
2. FEELING DOWN, DEPRESSED OR HOPELESS: NOT AT ALL
SUM OF ALL RESPONSES TO PHQ QUESTIONS 1-9: 0
SUM OF ALL RESPONSES TO PHQ QUESTIONS 1-9: 0

## 2024-03-18 ENCOUNTER — OFFICE VISIT (OUTPATIENT)
Dept: INTERNAL MEDICINE CLINIC | Facility: CLINIC | Age: 69
End: 2024-03-18
Payer: MEDICARE

## 2024-03-18 VITALS
TEMPERATURE: 97.3 F | HEIGHT: 66 IN | BODY MASS INDEX: 22.63 KG/M2 | DIASTOLIC BLOOD PRESSURE: 69 MMHG | HEART RATE: 76 BPM | OXYGEN SATURATION: 95 % | SYSTOLIC BLOOD PRESSURE: 129 MMHG | WEIGHT: 140.8 LBS

## 2024-03-18 DIAGNOSIS — N30.10 INTERSTITIAL CYSTITIS: ICD-10-CM

## 2024-03-18 DIAGNOSIS — I42.0 DILATED CARDIOMYOPATHY (HCC): ICD-10-CM

## 2024-03-18 DIAGNOSIS — I25.10 CORONARY ARTERY DISEASE INVOLVING NATIVE CORONARY ARTERY OF NATIVE HEART WITHOUT ANGINA PECTORIS: ICD-10-CM

## 2024-03-18 DIAGNOSIS — E78.5 DYSLIPIDEMIA: ICD-10-CM

## 2024-03-18 DIAGNOSIS — Z00.00 MEDICARE ANNUAL WELLNESS VISIT, SUBSEQUENT: Primary | ICD-10-CM

## 2024-03-18 DIAGNOSIS — M85.89 OSTEOPENIA OF MULTIPLE SITES: ICD-10-CM

## 2024-03-18 DIAGNOSIS — E04.2 MULTIPLE THYROID NODULES: ICD-10-CM

## 2024-03-18 DIAGNOSIS — N32.81 OVERACTIVE BLADDER: ICD-10-CM

## 2024-03-18 DIAGNOSIS — K21.9 GASTROESOPHAGEAL REFLUX DISEASE, UNSPECIFIED WHETHER ESOPHAGITIS PRESENT: ICD-10-CM

## 2024-03-18 LAB
25(OH)D3 SERPL-MCNC: 36.3 NG/ML (ref 30–100)
BASOPHILS # BLD: 0.1 K/UL (ref 0–0.2)
BASOPHILS NFR BLD: 1 % (ref 0–2)
DIFFERENTIAL METHOD BLD: ABNORMAL
EOSINOPHIL # BLD: 0.2 K/UL (ref 0–0.8)
EOSINOPHIL NFR BLD: 5 % (ref 0.5–7.8)
ERYTHROCYTE [DISTWIDTH] IN BLOOD BY AUTOMATED COUNT: 12.7 % (ref 11.9–14.6)
HCT VFR BLD AUTO: 41.1 % (ref 35.8–46.3)
HGB BLD-MCNC: 13 G/DL (ref 11.7–15.4)
IMM GRANULOCYTES # BLD AUTO: 0 K/UL (ref 0–0.5)
IMM GRANULOCYTES NFR BLD AUTO: 0 % (ref 0–5)
LYMPHOCYTES # BLD: 1.2 K/UL (ref 0.5–4.6)
LYMPHOCYTES NFR BLD: 28 % (ref 13–44)
MCH RBC QN AUTO: 29.7 PG (ref 26.1–32.9)
MCHC RBC AUTO-ENTMCNC: 31.6 G/DL (ref 31.4–35)
MCV RBC AUTO: 94.1 FL (ref 82–102)
MONOCYTES # BLD: 0.5 K/UL (ref 0.1–1.3)
MONOCYTES NFR BLD: 12 % (ref 4–12)
NEUTS SEG # BLD: 2.3 K/UL (ref 1.7–8.2)
NEUTS SEG NFR BLD: 54 % (ref 43–78)
NRBC # BLD: 0 K/UL (ref 0–0.2)
PLATELET # BLD AUTO: 177 K/UL (ref 150–450)
PMV BLD AUTO: 12.7 FL (ref 9.4–12.3)
RBC # BLD AUTO: 4.37 M/UL (ref 4.05–5.2)
TSH W FREE THYROID IF ABNORMAL: 2.03 UIU/ML (ref 0.36–3.74)
WBC # BLD AUTO: 4.3 K/UL (ref 4.3–11.1)

## 2024-03-18 PROCEDURE — G8420 CALC BMI NORM PARAMETERS: HCPCS | Performed by: NURSE PRACTITIONER

## 2024-03-18 PROCEDURE — 1090F PRES/ABSN URINE INCON ASSESS: CPT | Performed by: NURSE PRACTITIONER

## 2024-03-18 PROCEDURE — 1036F TOBACCO NON-USER: CPT | Performed by: NURSE PRACTITIONER

## 2024-03-18 PROCEDURE — 3017F COLORECTAL CA SCREEN DOC REV: CPT | Performed by: NURSE PRACTITIONER

## 2024-03-18 PROCEDURE — G8484 FLU IMMUNIZE NO ADMIN: HCPCS | Performed by: NURSE PRACTITIONER

## 2024-03-18 PROCEDURE — G8427 DOCREV CUR MEDS BY ELIG CLIN: HCPCS | Performed by: NURSE PRACTITIONER

## 2024-03-18 PROCEDURE — G0439 PPPS, SUBSEQ VISIT: HCPCS | Performed by: NURSE PRACTITIONER

## 2024-03-18 PROCEDURE — 1123F ACP DISCUSS/DSCN MKR DOCD: CPT | Performed by: NURSE PRACTITIONER

## 2024-03-18 PROCEDURE — 99214 OFFICE O/P EST MOD 30 MIN: CPT | Performed by: NURSE PRACTITIONER

## 2024-03-18 PROCEDURE — G8399 PT W/DXA RESULTS DOCUMENT: HCPCS | Performed by: NURSE PRACTITIONER

## 2024-03-18 RX ORDER — CEPHALEXIN 500 MG/1
500 CAPSULE ORAL 2 TIMES DAILY
Qty: 14 CAPSULE | Refills: 0 | Status: SHIPPED | OUTPATIENT
Start: 2024-03-18 | End: 2024-03-25

## 2024-03-18 RX ORDER — PANTOPRAZOLE SODIUM 40 MG/1
40 TABLET, DELAYED RELEASE ORAL DAILY
Qty: 90 TABLET | Refills: 3 | Status: SHIPPED | OUTPATIENT
Start: 2024-03-18

## 2024-03-18 ASSESSMENT — ENCOUNTER SYMPTOMS
ABDOMINAL PAIN: 0
NAUSEA: 0
SHORTNESS OF BREATH: 0
VOMITING: 0
DIARRHEA: 0
COUGH: 0

## 2024-03-18 ASSESSMENT — PATIENT HEALTH QUESTIONNAIRE - PHQ9
4. FEELING TIRED OR HAVING LITTLE ENERGY: NOT AT ALL
7. TROUBLE CONCENTRATING ON THINGS, SUCH AS READING THE NEWSPAPER OR WATCHING TELEVISION: NOT AT ALL
SUM OF ALL RESPONSES TO PHQ QUESTIONS 1-9: 0
5. POOR APPETITE OR OVEREATING: NOT AT ALL
6. FEELING BAD ABOUT YOURSELF - OR THAT YOU ARE A FAILURE OR HAVE LET YOURSELF OR YOUR FAMILY DOWN: NOT AT ALL
2. FEELING DOWN, DEPRESSED OR HOPELESS: NOT AT ALL
9. THOUGHTS THAT YOU WOULD BE BETTER OFF DEAD, OR OF HURTING YOURSELF: NOT AT ALL
SUM OF ALL RESPONSES TO PHQ QUESTIONS 1-9: 0
SUM OF ALL RESPONSES TO PHQ9 QUESTIONS 1 & 2: 0
3. TROUBLE FALLING OR STAYING ASLEEP: NOT AT ALL
8. MOVING OR SPEAKING SO SLOWLY THAT OTHER PEOPLE COULD HAVE NOTICED. OR THE OPPOSITE, BEING SO FIGETY OR RESTLESS THAT YOU HAVE BEEN MOVING AROUND A LOT MORE THAN USUAL: NOT AT ALL
1. LITTLE INTEREST OR PLEASURE IN DOING THINGS: NOT AT ALL
10. IF YOU CHECKED OFF ANY PROBLEMS, HOW DIFFICULT HAVE THESE PROBLEMS MADE IT FOR YOU TO DO YOUR WORK, TAKE CARE OF THINGS AT HOME, OR GET ALONG WITH OTHER PEOPLE: NOT DIFFICULT AT ALL

## 2024-03-18 NOTE — PROGRESS NOTES
days Yes Jaxon Mcdonald DO   carvedilol (COREG) 6.25 MG tablet TAKE 1 TABLET TWICE DAILY  WITH MEALS Yes Jaxon Mcdonald DO   estradiol (ESTRACE) 0.1 MG/GM vaginal cream Place 1 g vaginally Twice a Week Yes Osiris Moffett APRN - CNP   ondansetron (ZOFRAN) 4 MG tablet Take 1 tablet by mouth 3 times daily as needed for Nausea or Vomiting Yes Luis Felipe Shukla MD   aspirin 81 MG EC tablet Take 1 tablet by mouth Yes Automatic Reconciliation, Ar   Meth-Hyo-M Bl-Na Phos-Ph Sal (URIBEL) 118 MG CAPS Take 1 capsule by mouth 4 times daily As needed Yes Automatic Reconciliation, Ar   nitroGLYCERIN (NITROSTAT) 0.4 MG SL tablet Place 1 tablet under the tongue Yes Automatic Reconciliation, Ar       CareTeam (Including outside providers/suppliers regularly involved in providing care):   Patient Care Team:  Jocelin Clements APRN - CNP as PCP - General  Jocelin Clements APRN - CNP as PCP - Empaneled Provider  Kong De Anda MD as Physician  Saw Singh MD as Physician  Marci Ruiz MD as Physician  Jaxon Mcdonald DO as Consulting Physician (Internal Medicine Cardiovascular Disease)     Reviewed and updated this visit:  Tobacco  Allergies  Meds  Problems  Med Hx  Surg Hx  Soc Hx  Fam Hx

## 2024-03-25 ENCOUNTER — OFFICE VISIT (OUTPATIENT)
Dept: INTERNAL MEDICINE CLINIC | Facility: CLINIC | Age: 69
End: 2024-03-25
Payer: MEDICARE

## 2024-03-25 VITALS
BODY MASS INDEX: 22.5 KG/M2 | OXYGEN SATURATION: 98 % | HEART RATE: 69 BPM | WEIGHT: 140 LBS | HEIGHT: 66 IN | SYSTOLIC BLOOD PRESSURE: 119 MMHG | DIASTOLIC BLOOD PRESSURE: 71 MMHG | TEMPERATURE: 97.2 F

## 2024-03-25 DIAGNOSIS — S61.411D LACERATION OF RIGHT HAND WITHOUT FOREIGN BODY, SUBSEQUENT ENCOUNTER: Primary | ICD-10-CM

## 2024-03-25 PROCEDURE — 1036F TOBACCO NON-USER: CPT | Performed by: NURSE PRACTITIONER

## 2024-03-25 PROCEDURE — G8484 FLU IMMUNIZE NO ADMIN: HCPCS | Performed by: NURSE PRACTITIONER

## 2024-03-25 PROCEDURE — 3017F COLORECTAL CA SCREEN DOC REV: CPT | Performed by: NURSE PRACTITIONER

## 2024-03-25 PROCEDURE — G8420 CALC BMI NORM PARAMETERS: HCPCS | Performed by: NURSE PRACTITIONER

## 2024-03-25 PROCEDURE — 1090F PRES/ABSN URINE INCON ASSESS: CPT | Performed by: NURSE PRACTITIONER

## 2024-03-25 PROCEDURE — G8427 DOCREV CUR MEDS BY ELIG CLIN: HCPCS | Performed by: NURSE PRACTITIONER

## 2024-03-25 PROCEDURE — 99213 OFFICE O/P EST LOW 20 MIN: CPT | Performed by: NURSE PRACTITIONER

## 2024-03-25 PROCEDURE — G8399 PT W/DXA RESULTS DOCUMENT: HCPCS | Performed by: NURSE PRACTITIONER

## 2024-03-25 PROCEDURE — 1123F ACP DISCUSS/DSCN MKR DOCD: CPT | Performed by: NURSE PRACTITIONER

## 2024-03-25 ASSESSMENT — ENCOUNTER SYMPTOMS
COUGH: 0
SHORTNESS OF BREATH: 0

## 2024-03-25 NOTE — PROGRESS NOTES
dead, or of hurting yourself in some way 0   PHQ-2 Score 0   PHQ-9 Total Score 0   If you checked off any problems, how difficult have these problems made it for you to do your work, take care of things at home, or get along with other people? 0        Assessment/Plan:      Health Maintenance Due   Topic Date Due    Pneumococcal 65+ years Vaccine (1 of 2 - PCV) Never done    Shingles vaccine (1 of 2) Never done    Respiratory Syncytial Virus (RSV) Pregnant or age 60 yrs+ (1 - 1-dose 60+ series) Never done    COVID-19 Vaccine (2 - 2023-24 season) 09/01/2023    DTaP/Tdap/Td vaccine (1 - Tdap) 03/13/2024          Vickie was seen today for suture / staple removal.    Diagnoses and all orders for this visit:    Laceration of right hand without foreign body, subsequent encounter      Patient states she is otherwise doing well; has no further questions or concerns at this visit.  Encouraged to contact office with any concerns prior to next visit.     Return in about 4 days (around 3/29/2024), or if symptoms worsen or fail to improve.    Jocelin Clements, APRN - CNP

## 2024-03-29 ENCOUNTER — OFFICE VISIT (OUTPATIENT)
Dept: INTERNAL MEDICINE CLINIC | Facility: CLINIC | Age: 69
End: 2024-03-29
Payer: MEDICARE

## 2024-03-29 VITALS
OXYGEN SATURATION: 98 % | BODY MASS INDEX: 22.82 KG/M2 | HEIGHT: 66 IN | TEMPERATURE: 97.1 F | HEART RATE: 70 BPM | DIASTOLIC BLOOD PRESSURE: 71 MMHG | SYSTOLIC BLOOD PRESSURE: 123 MMHG | WEIGHT: 142 LBS

## 2024-03-29 DIAGNOSIS — S61.411D LACERATION OF RIGHT HAND WITHOUT FOREIGN BODY, SUBSEQUENT ENCOUNTER: Primary | ICD-10-CM

## 2024-03-29 DIAGNOSIS — Z48.02 VISIT FOR SUTURE REMOVAL: ICD-10-CM

## 2024-03-29 PROCEDURE — 99213 OFFICE O/P EST LOW 20 MIN: CPT | Performed by: NURSE PRACTITIONER

## 2024-03-29 PROCEDURE — 3017F COLORECTAL CA SCREEN DOC REV: CPT | Performed by: NURSE PRACTITIONER

## 2024-03-29 PROCEDURE — 1036F TOBACCO NON-USER: CPT | Performed by: NURSE PRACTITIONER

## 2024-03-29 PROCEDURE — G8420 CALC BMI NORM PARAMETERS: HCPCS | Performed by: NURSE PRACTITIONER

## 2024-03-29 PROCEDURE — G8399 PT W/DXA RESULTS DOCUMENT: HCPCS | Performed by: NURSE PRACTITIONER

## 2024-03-29 PROCEDURE — 1123F ACP DISCUSS/DSCN MKR DOCD: CPT | Performed by: NURSE PRACTITIONER

## 2024-03-29 PROCEDURE — 1090F PRES/ABSN URINE INCON ASSESS: CPT | Performed by: NURSE PRACTITIONER

## 2024-03-29 PROCEDURE — G8427 DOCREV CUR MEDS BY ELIG CLIN: HCPCS | Performed by: NURSE PRACTITIONER

## 2024-03-29 PROCEDURE — G8484 FLU IMMUNIZE NO ADMIN: HCPCS | Performed by: NURSE PRACTITIONER

## 2024-03-29 ASSESSMENT — ENCOUNTER SYMPTOMS
COUGH: 0
SHORTNESS OF BREATH: 0

## 2024-03-29 NOTE — PROGRESS NOTES
[facility]  Office Visit Note    Subjective:  Chief Complaint   Patient presents with    Suture / Staple Removal       Patient ID: Vickie Neumann is a 68 y.o. female presenting to the office for the above.    Pt here for suture removal. Wound is c/d/i. No signs of infection; she has completed antibiotic. Sutures are holding large flap of skin of the thenar eminence. Healing well. Sutures on medial side of wound removed today without difficulty. Lateral side of wound remains loose. Discussed with patient, and she is going to return on Monday to remove the remaining 5 sutures.         History:  Allergies   Allergen Reactions    Codeine Nausea Only    Conjugated Estrogens Palpitations     Patient does not know why this is on the list    Doxycycline Nausea Only    Levofloxacin Nausea And Vomiting    Penicillins Rash    Sulfa Antibiotics Headaches       Past Medical History:   Diagnosis Date    Anxiety disorder     Basal cell carcinoma of skin     Chronic insomnia     Coronary artery disease     COVID-19 2020    mild case     Dyslipidemia     Fibromyalgia     Gastroesophageal reflux disease     Interstitial cystitis     Irritable bowel syndrome     Ischemic colitis (HCC)     Multiple thyroid nodules     S/P thyroid bx 2019-benign    Osteopenia     Overactive bladder     Squamous cell carcinoma of skin     Systolic congestive heart failure (HCC)        Past Surgical History:   Procedure Laterality Date    BREAST BIOPSY Left     BREAST SURGERY Bilateral     CARDIAC CATHETERIZATION  2018     SECTION  1984    COLONOSCOPY      WNL    CORONARY ARTERY BYPASS GRAFT  2018    CABG X 3    PACEMAKER PLACEMENT  2018    and defibrillator    AYSE AND BSO (CERVIX REMOVED)      TONSILLECTOMY AND ADENOIDECTOMY  as a child       Family History   Problem Relation Age of Onset    Thyroid Disease Neg Hx     Thyroid Cancer Neg Hx     Breast Cancer Paternal Grandmother 72    Cancer

## 2024-04-01 ENCOUNTER — OFFICE VISIT (OUTPATIENT)
Dept: INTERNAL MEDICINE CLINIC | Facility: CLINIC | Age: 69
End: 2024-04-01
Payer: MEDICARE

## 2024-04-01 VITALS
HEIGHT: 66 IN | TEMPERATURE: 97.2 F | BODY MASS INDEX: 22.5 KG/M2 | HEART RATE: 81 BPM | OXYGEN SATURATION: 95 % | WEIGHT: 140 LBS | SYSTOLIC BLOOD PRESSURE: 138 MMHG | DIASTOLIC BLOOD PRESSURE: 69 MMHG

## 2024-04-01 DIAGNOSIS — S61.411D LACERATION OF RIGHT HAND WITHOUT FOREIGN BODY, SUBSEQUENT ENCOUNTER: Primary | ICD-10-CM

## 2024-04-01 DIAGNOSIS — Z48.02 VISIT FOR SUTURE REMOVAL: ICD-10-CM

## 2024-04-01 PROCEDURE — 1036F TOBACCO NON-USER: CPT | Performed by: NURSE PRACTITIONER

## 2024-04-01 PROCEDURE — G8420 CALC BMI NORM PARAMETERS: HCPCS | Performed by: NURSE PRACTITIONER

## 2024-04-01 PROCEDURE — 99213 OFFICE O/P EST LOW 20 MIN: CPT | Performed by: NURSE PRACTITIONER

## 2024-04-01 PROCEDURE — G8427 DOCREV CUR MEDS BY ELIG CLIN: HCPCS | Performed by: NURSE PRACTITIONER

## 2024-04-01 PROCEDURE — 3017F COLORECTAL CA SCREEN DOC REV: CPT | Performed by: NURSE PRACTITIONER

## 2024-04-01 PROCEDURE — 1090F PRES/ABSN URINE INCON ASSESS: CPT | Performed by: NURSE PRACTITIONER

## 2024-04-01 PROCEDURE — 1123F ACP DISCUSS/DSCN MKR DOCD: CPT | Performed by: NURSE PRACTITIONER

## 2024-04-01 PROCEDURE — G8399 PT W/DXA RESULTS DOCUMENT: HCPCS | Performed by: NURSE PRACTITIONER

## 2024-04-01 RX ORDER — CEPHALEXIN 500 MG/1
500 CAPSULE ORAL 2 TIMES DAILY
Qty: 14 CAPSULE | Refills: 0 | Status: SHIPPED | OUTPATIENT
Start: 2024-04-01 | End: 2024-04-08

## 2024-04-01 ASSESSMENT — ENCOUNTER SYMPTOMS
SHORTNESS OF BREATH: 0
COUGH: 0

## 2024-04-01 NOTE — PROGRESS NOTES
fidgety or restless that you have been moving around a lot more than usual 0   Thoughts that you would be better off dead, or of hurting yourself in some way 0   PHQ-2 Score 0   PHQ-9 Total Score 0   If you checked off any problems, how difficult have these problems made it for you to do your work, take care of things at home, or get along with other people? 0        Assessment/Plan:      Health Maintenance Due   Topic Date Due    Pneumococcal 65+ years Vaccine (1 of 2 - PCV) Never done    Shingles vaccine (1 of 2) Never done    Respiratory Syncytial Virus (RSV) Pregnant or age 60 yrs+ (1 - 1-dose 60+ series) Never done    COVID-19 Vaccine (2 - 2023-24 season) 09/01/2023    DTaP/Tdap/Td vaccine (1 - Tdap) 03/13/2024          Vickie was seen today for hand laceration.    Diagnoses and all orders for this visit:    Laceration of right hand without foreign body, subsequent encounter  -     cephALEXin (KEFLEX) 500 MG capsule; Take 1 capsule by mouth 2 times daily for 7 days    Visit for suture removal      Patient states she is otherwise doing well; has no further questions or concerns at this visit.  Encouraged to contact office with any concerns prior to next visit.     Return if symptoms worsen or fail to improve, for Next scheduled visit.    Jocelin Clements, APRN - CNP

## 2024-04-03 ENCOUNTER — OFFICE VISIT (OUTPATIENT)
Dept: INTERNAL MEDICINE CLINIC | Facility: CLINIC | Age: 69
End: 2024-04-03
Payer: MEDICARE

## 2024-04-03 VITALS
TEMPERATURE: 97.5 F | BODY MASS INDEX: 22.34 KG/M2 | DIASTOLIC BLOOD PRESSURE: 71 MMHG | HEART RATE: 77 BPM | WEIGHT: 139 LBS | HEIGHT: 66 IN | OXYGEN SATURATION: 97 % | SYSTOLIC BLOOD PRESSURE: 120 MMHG

## 2024-04-03 DIAGNOSIS — Z48.02 VISIT FOR SUTURE REMOVAL: ICD-10-CM

## 2024-04-03 DIAGNOSIS — S61.411D LACERATION OF RIGHT HAND WITHOUT FOREIGN BODY, SUBSEQUENT ENCOUNTER: Primary | ICD-10-CM

## 2024-04-03 PROCEDURE — G8420 CALC BMI NORM PARAMETERS: HCPCS | Performed by: NURSE PRACTITIONER

## 2024-04-03 PROCEDURE — 1036F TOBACCO NON-USER: CPT | Performed by: NURSE PRACTITIONER

## 2024-04-03 PROCEDURE — G8427 DOCREV CUR MEDS BY ELIG CLIN: HCPCS | Performed by: NURSE PRACTITIONER

## 2024-04-03 PROCEDURE — 1123F ACP DISCUSS/DSCN MKR DOCD: CPT | Performed by: NURSE PRACTITIONER

## 2024-04-03 PROCEDURE — 99024 POSTOP FOLLOW-UP VISIT: CPT | Performed by: NURSE PRACTITIONER

## 2024-04-03 PROCEDURE — G8399 PT W/DXA RESULTS DOCUMENT: HCPCS | Performed by: NURSE PRACTITIONER

## 2024-04-03 PROCEDURE — 1090F PRES/ABSN URINE INCON ASSESS: CPT | Performed by: NURSE PRACTITIONER

## 2024-04-03 PROCEDURE — 99213 OFFICE O/P EST LOW 20 MIN: CPT | Performed by: NURSE PRACTITIONER

## 2024-04-03 PROCEDURE — 3017F COLORECTAL CA SCREEN DOC REV: CPT | Performed by: NURSE PRACTITIONER

## 2024-04-03 ASSESSMENT — ENCOUNTER SYMPTOMS
SHORTNESS OF BREATH: 0
COUGH: 0

## 2024-04-03 NOTE — PROGRESS NOTES
usual 0   Thoughts that you would be better off dead, or of hurting yourself in some way 0   PHQ-2 Score 0   PHQ-9 Total Score 0   If you checked off any problems, how difficult have these problems made it for you to do your work, take care of things at home, or get along with other people? 0        Assessment/Plan:      Health Maintenance Due   Topic Date Due    Pneumococcal 65+ years Vaccine (1 of 2 - PCV) Never done    Shingles vaccine (1 of 2) Never done    Respiratory Syncytial Virus (RSV) Pregnant or age 60 yrs+ (1 - 1-dose 60+ series) Never done    COVID-19 Vaccine (2 - 2023-24 season) 09/01/2023    DTaP/Tdap/Td vaccine (1 - Tdap) 03/13/2024      (41997) Suture Removal    Date/Time: 4/3/2024 11:31 AM    Performed by: Jocelin Clements APRN - CNP  Authorized by: Jocelin Clements APRN - CNP  Body area: upper extremity  Location details: right hand  Wound Appearance: clean, pink and tender  Post-removal: dressing applied and antibiotic ointment applied  Patient tolerance: patient tolerated the procedure well with no immediate complications          Vickie was seen today for suture / staple removal.    Diagnoses and all orders for this visit:    Laceration of right hand without foreign body, subsequent encounter  -     (28474) Suture Removal    Visit for suture removal  -     (99024) Suture Removal        Patient states she is otherwise doing well; has no further questions or concerns at this visit.  Encouraged to contact office with any concerns prior to next visit.     Return if symptoms worsen or fail to improve.    SHRAVAN Hollingsworth CNP

## 2024-05-10 ENCOUNTER — NURSE ONLY (OUTPATIENT)
Age: 69
End: 2024-05-10

## 2024-05-10 DIAGNOSIS — I42.0 DILATED CARDIOMYOPATHY (HCC): Primary | ICD-10-CM

## 2024-05-28 RX ORDER — CARVEDILOL 6.25 MG/1
TABLET ORAL
Qty: 180 TABLET | Refills: 3 | OUTPATIENT
Start: 2024-05-28

## 2024-06-14 PROCEDURE — 93295 DEV INTERROG REMOTE 1/2/MLT: CPT | Performed by: INTERNAL MEDICINE

## 2024-06-14 PROCEDURE — 93296 REM INTERROG EVL PM/IDS: CPT | Performed by: INTERNAL MEDICINE

## 2024-06-17 ENCOUNTER — OFFICE VISIT (OUTPATIENT)
Dept: UROLOGY | Age: 69
End: 2024-06-17
Payer: MEDICARE

## 2024-06-17 DIAGNOSIS — N95.2 VAGINAL ATROPHY: ICD-10-CM

## 2024-06-17 DIAGNOSIS — N13.5 OBSTRUCTION OF LEFT URETEROPELVIC JUNCTION (UPJ): ICD-10-CM

## 2024-06-17 DIAGNOSIS — N39.0 RECURRENT UTI: ICD-10-CM

## 2024-06-17 DIAGNOSIS — N32.81 OVERACTIVE BLADDER: Primary | ICD-10-CM

## 2024-06-17 LAB
BILIRUBIN, URINE, POC: NEGATIVE
BLOOD URINE, POC: NEGATIVE
GLUCOSE URINE, POC: NEGATIVE
KETONES, URINE, POC: NEGATIVE
LEUKOCYTE ESTERASE, URINE, POC: NEGATIVE
NITRITE, URINE, POC: NEGATIVE
PH, URINE, POC: 6 (ref 4.6–8)
PROTEIN,URINE, POC: NEGATIVE
SPECIFIC GRAVITY, URINE, POC: 1.01 (ref 1–1.03)
URINALYSIS CLARITY, POC: NORMAL
URINALYSIS COLOR, POC: NORMAL
UROBILINOGEN, POC: NORMAL

## 2024-06-17 PROCEDURE — 1123F ACP DISCUSS/DSCN MKR DOCD: CPT | Performed by: NURSE PRACTITIONER

## 2024-06-17 PROCEDURE — G8399 PT W/DXA RESULTS DOCUMENT: HCPCS | Performed by: NURSE PRACTITIONER

## 2024-06-17 PROCEDURE — 81003 URINALYSIS AUTO W/O SCOPE: CPT | Performed by: NURSE PRACTITIONER

## 2024-06-17 PROCEDURE — 1090F PRES/ABSN URINE INCON ASSESS: CPT | Performed by: NURSE PRACTITIONER

## 2024-06-17 PROCEDURE — G8427 DOCREV CUR MEDS BY ELIG CLIN: HCPCS | Performed by: NURSE PRACTITIONER

## 2024-06-17 PROCEDURE — 3017F COLORECTAL CA SCREEN DOC REV: CPT | Performed by: NURSE PRACTITIONER

## 2024-06-17 PROCEDURE — 1036F TOBACCO NON-USER: CPT | Performed by: NURSE PRACTITIONER

## 2024-06-17 PROCEDURE — G8420 CALC BMI NORM PARAMETERS: HCPCS | Performed by: NURSE PRACTITIONER

## 2024-06-17 PROCEDURE — 99214 OFFICE O/P EST MOD 30 MIN: CPT | Performed by: NURSE PRACTITIONER

## 2024-06-17 RX ORDER — TROSPIUM CHLORIDE 20 MG/1
20 TABLET, FILM COATED ORAL 2 TIMES DAILY
Qty: 60 TABLET | Refills: 3 | Status: SHIPPED | OUTPATIENT
Start: 2024-06-17

## 2024-06-17 RX ORDER — ESTRADIOL 0.1 MG/G
1 CREAM VAGINAL
Qty: 42.5 G | Refills: 3 | Status: SHIPPED | OUTPATIENT
Start: 2024-06-17

## 2024-06-17 ASSESSMENT — ENCOUNTER SYMPTOMS
NAUSEA: 0
BACK PAIN: 0

## 2024-06-17 NOTE — PROGRESS NOTES
[facility]  [address]  [place] [address]  [phone]          Vickie Neumann  : 1955    Chief Complaint   Patient presents with    Follow-up          HPI     Vickie Neumann is a 68 y.o. female who is followed for a L UPJO, OAB, recurrent UTI, and vaginal atrophy.  Pt reports being diagnosed with this in mid 30's and elected for observation.  This was rediscovered again after a recent fall and sacral fx.  CT on 3/22/22 shows a L UPJO with likely crossing vessels.  MAG-3 completed on 22 shows a half time lasix washout of greater than 30min with the L kidney contributing 45% of renal function.  Stable renal function. No flank pain. She and Dr Mendosa have elected for no surgical intervention. sCr 0.70 . No flank pain.      Also has h/o OAB. Prev followed by Dr Owen. Failed to improve w Enablex and Vesicare. Also had dry mouth w medications. Only mild improvement w pelvic floor therapy. Cont to struggle w daytime frequency of multiple times per hour, nocturia x 4x/night, and SIGNIFICANT UUI. Wears a pad at all times. She did not try the Myrbetriq samples she was given in . Started Myrbetriq 2023. This had helped. She ended up having to stop this d/t nausea. Having bothersome UUI now.     Prev on premarin cream for vaginal atrophy. This was restarted in .     Prev h/o recurrent UTI. No problem now. No h/o renal stones.          Past Medical History:   Diagnosis Date    Anxiety disorder     Basal cell carcinoma of skin     Chronic insomnia     Coronary artery disease     COVID-19 2020    mild case     Dyslipidemia     Fibromyalgia     Gastroesophageal reflux disease     Interstitial cystitis     Irritable bowel syndrome     Ischemic colitis (HCC)     Multiple thyroid nodules     S/P thyroid bx 2019-benign    Osteopenia     Overactive bladder     Squamous cell carcinoma of skin     Systolic congestive heart failure (HCC)      Past

## 2024-08-12 DIAGNOSIS — I25.10 CORONARY ARTERY DISEASE INVOLVING NATIVE CORONARY ARTERY OF NATIVE HEART WITHOUT ANGINA PECTORIS: ICD-10-CM

## 2024-08-12 LAB
ALBUMIN SERPL-MCNC: 3.8 G/DL (ref 3.2–4.6)
ALBUMIN/GLOB SERPL: 1.3 (ref 1–1.9)
ALP SERPL-CCNC: 54 U/L (ref 35–104)
ALT SERPL-CCNC: 22 U/L (ref 12–65)
ANION GAP SERPL CALC-SCNC: 7 MMOL/L (ref 9–18)
AST SERPL-CCNC: 27 U/L (ref 15–37)
BILIRUB SERPL-MCNC: <0.2 MG/DL (ref 0–1.2)
BUN SERPL-MCNC: 18 MG/DL (ref 8–23)
CALCIUM SERPL-MCNC: 9.4 MG/DL (ref 8.8–10.2)
CHLORIDE SERPL-SCNC: 104 MMOL/L (ref 98–107)
CHOLEST SERPL-MCNC: 136 MG/DL (ref 0–200)
CO2 SERPL-SCNC: 29 MMOL/L (ref 20–28)
CREAT SERPL-MCNC: 0.71 MG/DL (ref 0.6–1.1)
ERYTHROCYTE [DISTWIDTH] IN BLOOD BY AUTOMATED COUNT: 13.2 % (ref 11.9–14.6)
GLOBULIN SER CALC-MCNC: 2.8 G/DL (ref 2.3–3.5)
GLUCOSE SERPL-MCNC: 93 MG/DL (ref 70–99)
HCT VFR BLD AUTO: 45.2 % (ref 35.8–46.3)
HDLC SERPL-MCNC: 47 MG/DL (ref 40–60)
HDLC SERPL: 2.9 (ref 0–5)
HGB BLD-MCNC: 13.9 G/DL (ref 11.7–15.4)
LDLC SERPL CALC-MCNC: 68 MG/DL (ref 0–100)
MAGNESIUM SERPL-MCNC: 2.1 MG/DL (ref 1.8–2.4)
MCH RBC QN AUTO: 29.7 PG (ref 26.1–32.9)
MCHC RBC AUTO-ENTMCNC: 30.8 G/DL (ref 31.4–35)
MCV RBC AUTO: 96.6 FL (ref 82–102)
NRBC # BLD: 0 K/UL (ref 0–0.2)
PLATELET # BLD AUTO: 149 K/UL (ref 150–450)
PMV BLD AUTO: 13.2 FL (ref 9.4–12.3)
POTASSIUM SERPL-SCNC: 4.3 MMOL/L (ref 3.5–5.1)
PROT SERPL-MCNC: 6.6 G/DL (ref 6.3–8.2)
RBC # BLD AUTO: 4.68 M/UL (ref 4.05–5.2)
SODIUM SERPL-SCNC: 140 MMOL/L (ref 136–145)
TRIGL SERPL-MCNC: 103 MG/DL (ref 0–150)
TSH, 3RD GENERATION: 2.08 UIU/ML (ref 0.27–4.2)
VLDLC SERPL CALC-MCNC: 21 MG/DL (ref 6–23)
WBC # BLD AUTO: 3.9 K/UL (ref 4.3–11.1)

## 2024-08-20 ENCOUNTER — OFFICE VISIT (OUTPATIENT)
Age: 69
End: 2024-08-20
Payer: MEDICARE

## 2024-08-20 VITALS
WEIGHT: 137.4 LBS | BODY MASS INDEX: 22.08 KG/M2 | DIASTOLIC BLOOD PRESSURE: 78 MMHG | SYSTOLIC BLOOD PRESSURE: 126 MMHG | HEART RATE: 72 BPM | HEIGHT: 66 IN

## 2024-08-20 DIAGNOSIS — I50.22 CHRONIC SYSTOLIC CONGESTIVE HEART FAILURE (HCC): ICD-10-CM

## 2024-08-20 DIAGNOSIS — I25.10 CORONARY ARTERY DISEASE INVOLVING NATIVE CORONARY ARTERY OF NATIVE HEART WITHOUT ANGINA PECTORIS: ICD-10-CM

## 2024-08-20 DIAGNOSIS — E78.5 DYSLIPIDEMIA: ICD-10-CM

## 2024-08-20 DIAGNOSIS — I42.0 DILATED CARDIOMYOPATHY (HCC): Primary | ICD-10-CM

## 2024-08-20 DIAGNOSIS — Z95.810 S/P IMPLANTATION OF AUTOMATIC CARDIOVERTER/DEFIBRILLATOR (AICD): ICD-10-CM

## 2024-08-20 PROCEDURE — 99214 OFFICE O/P EST MOD 30 MIN: CPT | Performed by: INTERNAL MEDICINE

## 2024-08-20 PROCEDURE — 1123F ACP DISCUSS/DSCN MKR DOCD: CPT | Performed by: INTERNAL MEDICINE

## 2024-08-20 PROCEDURE — 1090F PRES/ABSN URINE INCON ASSESS: CPT | Performed by: INTERNAL MEDICINE

## 2024-08-20 PROCEDURE — G8428 CUR MEDS NOT DOCUMENT: HCPCS | Performed by: INTERNAL MEDICINE

## 2024-08-20 PROCEDURE — G8399 PT W/DXA RESULTS DOCUMENT: HCPCS | Performed by: INTERNAL MEDICINE

## 2024-08-20 PROCEDURE — G8420 CALC BMI NORM PARAMETERS: HCPCS | Performed by: INTERNAL MEDICINE

## 2024-08-20 PROCEDURE — 1036F TOBACCO NON-USER: CPT | Performed by: INTERNAL MEDICINE

## 2024-08-20 PROCEDURE — 3017F COLORECTAL CA SCREEN DOC REV: CPT | Performed by: INTERNAL MEDICINE

## 2024-08-20 NOTE — TELEPHONE ENCOUNTER
----- Message from Dr. Jaxon Mcdonald, DO sent at 8/20/2024  9:51 AM EDT -----  She has side effects on repatha and statins, no side effects on praluent.   Can we get her back on praluent?  Can we get her insurance to approve it again, she was denied before.   Thanks

## 2024-08-20 NOTE — PROGRESS NOTES
2 [facility], [address] [place] [address]  PHONE: [phone]     24    NAME:  Vickie Neumann  : 1955  MRN: [id number]       SUBJECTIVE:   Vickie Neumann is a 68 y.o. female seen for a follow up visit regarding the following:     Chief Complaint   Patient presents with    Coronary Artery Disease    Congestive Heart Failure    Follow-up       HPI:   18:  CABG X 3 with LIMA to LAD, sequential SVG to Ramus and OM.    EF 30-35% before the CABG.     Echo 2019: EF 35-40%.    St. [employer]met biventricular ICD on 18.   Echo 10/2019: EF 50%  Echo 2023: EF 50-55%, mild MR  Carotid US 2023: mild Dz.   NST 2023: low risk of cardiac events.        Feeling better, walking 6 miles per day.  No angina.  Better now.     side effects on repatha.  No edema.    NO new edema, palp.   No pressure in chest.  No LARIOS.   Patient denies recent history of orthopnea, PND, excessive dizziness and/or syncope.     Off lipitor.   Side effects on repatha.         Past Medical History, Past Surgical History, Family history, Social History, and Medications were all reviewed with the patient today and updated as necessary.     Current Outpatient Medications   Medication Sig Dispense Refill    trospium (SANCTURA) 20 MG tablet Take 1 tablet by mouth 2 times daily 60 tablet 3    estradiol (ESTRACE) 0.1 MG/GM vaginal cream Place 1 g vaginally Twice a Week 42.5 g 3    pantoprazole (PROTONIX) 40 MG tablet Take 1 tablet by mouth daily TAKE 1 TABLET DAILY 90 tablet 3    Evolocumab 140 MG/ML SOAJ Inject 140 mg into the skin every 14 days 6 Adjustable Dose Pre-filled Pen Syringe 3    carvedilol (COREG) 6.25 MG tablet TAKE 1 TABLET TWICE DAILY  WITH MEALS 180 tablet 3    ondansetron (ZOFRAN) 4 MG tablet Take 1 tablet by mouth 3 times daily as needed for Nausea or Vomiting 12 tablet 0    aspirin 81 MG EC tablet Take 1 tablet by mouth      Meth-Hyo-M Bl-Na Phos-Ph Sal (URIBEL) 118 MG CAPS Take 1 capsule by mouth 4

## 2024-09-03 ENCOUNTER — TELEPHONE (OUTPATIENT)
Age: 69
End: 2024-09-03

## 2024-09-03 NOTE — TELEPHONE ENCOUNTER
We need to contact medicare to get Praluent approved.  RX was sent to [facility] and it was rejected by medicare as it is a refrigerated product.  It was sent to a mail order pharmacy.

## 2024-09-06 NOTE — TELEPHONE ENCOUNTER
Called pharmacy states Rx was denied due to not being on formulary. Advised needs to be approved due to the fact pt cannot tolerate statins or repatha.     On hold for 40 mins had to hang up due to long wait and working with doctor  will attempt to call back at a later time   [phone]  F [phone]

## 2024-09-12 ENCOUNTER — TELEPHONE (OUTPATIENT)
Age: 69
End: 2024-09-12

## 2024-09-23 SDOH — ECONOMIC STABILITY: FOOD INSECURITY: WITHIN THE PAST 12 MONTHS, THE FOOD YOU BOUGHT JUST DIDN'T LAST AND YOU DIDN'T HAVE MONEY TO GET MORE.: PATIENT DECLINED

## 2024-09-23 SDOH — ECONOMIC STABILITY: TRANSPORTATION INSECURITY
IN THE PAST 12 MONTHS, HAS LACK OF TRANSPORTATION KEPT YOU FROM MEETINGS, WORK, OR FROM GETTING THINGS NEEDED FOR DAILY LIVING?: PATIENT DECLINED

## 2024-09-23 SDOH — ECONOMIC STABILITY: INCOME INSECURITY: HOW HARD IS IT FOR YOU TO PAY FOR THE VERY BASICS LIKE FOOD, HOUSING, MEDICAL CARE, AND HEATING?: PATIENT DECLINED

## 2024-09-23 SDOH — ECONOMIC STABILITY: FOOD INSECURITY: WITHIN THE PAST 12 MONTHS, YOU WORRIED THAT YOUR FOOD WOULD RUN OUT BEFORE YOU GOT MONEY TO BUY MORE.: PATIENT DECLINED

## 2024-09-24 ENCOUNTER — OFFICE VISIT (OUTPATIENT)
Dept: INTERNAL MEDICINE CLINIC | Facility: CLINIC | Age: 69
End: 2024-09-24

## 2024-09-24 VITALS
DIASTOLIC BLOOD PRESSURE: 64 MMHG | SYSTOLIC BLOOD PRESSURE: 117 MMHG | HEIGHT: 66 IN | HEART RATE: 61 BPM | WEIGHT: 138.13 LBS | OXYGEN SATURATION: 99 % | BODY MASS INDEX: 22.2 KG/M2 | TEMPERATURE: 97.2 F

## 2024-09-24 DIAGNOSIS — I25.10 CORONARY ARTERY DISEASE INVOLVING NATIVE CORONARY ARTERY OF NATIVE HEART WITHOUT ANGINA PECTORIS: Primary | ICD-10-CM

## 2024-09-24 DIAGNOSIS — M85.89 OSTEOPENIA OF MULTIPLE SITES: ICD-10-CM

## 2024-09-24 DIAGNOSIS — Z95.810 S/P IMPLANTATION OF AUTOMATIC CARDIOVERTER/DEFIBRILLATOR (AICD): ICD-10-CM

## 2024-09-24 DIAGNOSIS — N32.81 OVERACTIVE BLADDER: ICD-10-CM

## 2024-09-24 DIAGNOSIS — I42.0 DILATED CARDIOMYOPATHY (HCC): ICD-10-CM

## 2024-09-24 DIAGNOSIS — E78.5 DYSLIPIDEMIA: ICD-10-CM

## 2024-09-24 DIAGNOSIS — K21.9 GASTROESOPHAGEAL REFLUX DISEASE WITHOUT ESOPHAGITIS: ICD-10-CM

## 2024-09-24 DIAGNOSIS — E04.2 MULTIPLE THYROID NODULES: ICD-10-CM

## 2024-09-24 DIAGNOSIS — Z12.31 SCREENING MAMMOGRAM FOR BREAST CANCER: ICD-10-CM

## 2024-09-24 DIAGNOSIS — I50.22 CHRONIC SYSTOLIC CONGESTIVE HEART FAILURE (HCC): ICD-10-CM

## 2024-09-24 RX ORDER — PANTOPRAZOLE SODIUM 20 MG/1
20 TABLET, DELAYED RELEASE ORAL
Qty: 90 TABLET | Refills: 1 | Status: SHIPPED | OUTPATIENT
Start: 2024-09-24

## 2024-09-24 ASSESSMENT — ENCOUNTER SYMPTOMS
WHEEZING: 0
SHORTNESS OF BREATH: 0
COUGH: 0
VOMITING: 0
NAUSEA: 0
ABDOMINAL PAIN: 0
DIARRHEA: 0

## 2024-11-08 ENCOUNTER — TELEPHONE (OUTPATIENT)
Dept: INTERNAL MEDICINE CLINIC | Facility: CLINIC | Age: 69
End: 2024-11-08

## 2024-11-08 DIAGNOSIS — N64.4 BREAST PAIN, LEFT: Primary | ICD-10-CM

## 2024-11-08 NOTE — TELEPHONE ENCOUNTER
Patient called to schedule her Mammogram at [facility], she told  that she had been experiencing left breast pain in the area where her pacemaker and defibrillator are.  Patient needs to get orders for a bilateral diagnostic mammogram and a left breast ultrasound.

## 2024-12-02 ENCOUNTER — HOSPITAL ENCOUNTER (OUTPATIENT)
Dept: MAMMOGRAPHY | Age: 69
Discharge: HOME | End: 2024-12-05
Payer: MEDICARE

## 2024-12-02 DIAGNOSIS — N64.4 BREAST PAIN, LEFT: ICD-10-CM

## 2024-12-02 PROCEDURE — G0279 TOMOSYNTHESIS, MAMMO: HCPCS

## 2024-12-02 PROCEDURE — 76642 ULTRASOUND BREAST LIMITED: CPT

## 2025-02-10 DIAGNOSIS — I25.10 CAD (CORONARY ARTERY DISEASE): Primary | ICD-10-CM

## 2025-02-10 DIAGNOSIS — E78.5 DYSLIPIDEMIA: ICD-10-CM

## 2025-02-10 DIAGNOSIS — I42.0 DILATED CARDIOMYOPATHY (HCC): ICD-10-CM

## 2025-02-10 DIAGNOSIS — I25.10 CAD (CORONARY ARTERY DISEASE): ICD-10-CM

## 2025-02-10 LAB
ALBUMIN SERPL-MCNC: 3.5 G/DL (ref 3.2–4.6)
ALBUMIN/GLOB SERPL: 1.1 (ref 1–1.9)
ALP SERPL-CCNC: 63 U/L (ref 35–104)
ALT SERPL-CCNC: 19 U/L (ref 8–45)
ANION GAP SERPL CALC-SCNC: 8 MMOL/L (ref 7–16)
AST SERPL-CCNC: 27 U/L (ref 15–37)
BILIRUB SERPL-MCNC: 0.2 MG/DL (ref 0–1.2)
BUN SERPL-MCNC: 25 MG/DL (ref 8–23)
CALCIUM SERPL-MCNC: 9.2 MG/DL (ref 8.8–10.2)
CHLORIDE SERPL-SCNC: 105 MMOL/L (ref 98–107)
CHOLEST SERPL-MCNC: 132 MG/DL (ref 0–200)
CO2 SERPL-SCNC: 28 MMOL/L (ref 20–29)
CREAT SERPL-MCNC: 0.68 MG/DL (ref 0.6–1.1)
GLOBULIN SER CALC-MCNC: 3.2 G/DL (ref 2.3–3.5)
GLUCOSE SERPL-MCNC: 94 MG/DL (ref 70–99)
HDLC SERPL-MCNC: 52 MG/DL (ref 40–60)
HDLC SERPL: 2.5 (ref 0–5)
LDLC SERPL CALC-MCNC: 66 MG/DL (ref 0–100)
POTASSIUM SERPL-SCNC: 4.1 MMOL/L (ref 3.5–5.1)
PROT SERPL-MCNC: 6.7 G/DL (ref 6.3–8.2)
SODIUM SERPL-SCNC: 140 MMOL/L (ref 136–145)
TRIGL SERPL-MCNC: 72 MG/DL (ref 0–150)
VLDLC SERPL CALC-MCNC: 14 MG/DL (ref 6–23)

## 2025-02-18 ENCOUNTER — OFFICE VISIT (OUTPATIENT)
Age: 70
End: 2025-02-18
Payer: MEDICARE

## 2025-02-18 VITALS
BODY MASS INDEX: 23.14 KG/M2 | HEART RATE: 70 BPM | DIASTOLIC BLOOD PRESSURE: 64 MMHG | SYSTOLIC BLOOD PRESSURE: 136 MMHG | HEIGHT: 66 IN | WEIGHT: 144 LBS

## 2025-02-18 DIAGNOSIS — I42.0 DILATED CARDIOMYOPATHY (HCC): ICD-10-CM

## 2025-02-18 DIAGNOSIS — I50.22 CHRONIC SYSTOLIC CONGESTIVE HEART FAILURE (HCC): ICD-10-CM

## 2025-02-18 DIAGNOSIS — E78.5 DYSLIPIDEMIA: ICD-10-CM

## 2025-02-18 DIAGNOSIS — Z95.1 S/P CABG X 3: ICD-10-CM

## 2025-02-18 DIAGNOSIS — I44.7 LBBB (LEFT BUNDLE BRANCH BLOCK): ICD-10-CM

## 2025-02-18 DIAGNOSIS — Z95.810 S/P IMPLANTATION OF AUTOMATIC CARDIOVERTER/DEFIBRILLATOR (AICD): ICD-10-CM

## 2025-02-18 DIAGNOSIS — I25.10 CORONARY ARTERY DISEASE INVOLVING NATIVE CORONARY ARTERY OF NATIVE HEART WITHOUT ANGINA PECTORIS: Primary | ICD-10-CM

## 2025-02-18 PROCEDURE — G8420 CALC BMI NORM PARAMETERS: HCPCS | Performed by: INTERNAL MEDICINE

## 2025-02-18 PROCEDURE — 1036F TOBACCO NON-USER: CPT | Performed by: INTERNAL MEDICINE

## 2025-02-18 PROCEDURE — 3017F COLORECTAL CA SCREEN DOC REV: CPT | Performed by: INTERNAL MEDICINE

## 2025-02-18 PROCEDURE — 1090F PRES/ABSN URINE INCON ASSESS: CPT | Performed by: INTERNAL MEDICINE

## 2025-02-18 PROCEDURE — 99214 OFFICE O/P EST MOD 30 MIN: CPT | Performed by: INTERNAL MEDICINE

## 2025-02-18 PROCEDURE — G8428 CUR MEDS NOT DOCUMENT: HCPCS | Performed by: INTERNAL MEDICINE

## 2025-02-18 PROCEDURE — 1123F ACP DISCUSS/DSCN MKR DOCD: CPT | Performed by: INTERNAL MEDICINE

## 2025-02-18 PROCEDURE — 1126F AMNT PAIN NOTED NONE PRSNT: CPT | Performed by: INTERNAL MEDICINE

## 2025-02-18 PROCEDURE — G8399 PT W/DXA RESULTS DOCUMENT: HCPCS | Performed by: INTERNAL MEDICINE

## 2025-02-18 NOTE — PROGRESS NOTES
\"LDL[facility]\"  Lab Results   Component Value Date    VLDL 14 02/10/2025    VLDL 21 08/12/2024    VLDL 19.2 02/06/2024     Lab Results   Component Value Date    CHOLHDLRATIO 2.5 02/10/2025    CHOLHDLRATIO 2.9 08/12/2024    CHOLHDLRATIO 2.6 02/06/2024     Lab Results   Component Value Date    LDL 66 02/10/2025           07/31/23    TRANSTHORACIC ECHOCARDIOGRAM (TTE) COMPLETE (CONTRAST/BUBBLE/3D PRN) 08/01/2023  6:35 AM (Final)    Interpretation Summary    Left Ventricle: Normal left ventricular systolic function with a visually estimated EF of 50 - 55%. Left ventricle size is normal. Normal wall thickness. Normal wall motion. Normal diastolic function.    Aortic Valve: Trileaflet valve. Sclerosis of the aortic valve cusp.    Mitral Valve: Mildly thickened leaflet. Mild regurgitation.    Tricuspid Valve: The estimated RVSP is 20 mmHg.    Aorta: Ao root diameter is 2.9 cm. Ao Root Index is 1.70 cm/m2.    Signed by: Jean Carlos Ford MD on 8/1/2023  6:35 AM        I have Independently reviewed prior care notes, any ER records available, cardiac testing, labs and results with the patient and before seeing the patient today.  Also independently reviewed outside records when available.       ASSESSMENT:    Vickie was seen today for cardiomyopathy and congestive heart failure.    Diagnoses and all orders for this visit:    Coronary artery disease involving native coronary artery of native heart without angina pectoris  -     alirocumab 150 MG/ML SOAJ; Inject 150 mg into the skin every 14 days    Dilated cardiomyopathy (HCC)    LBBB (left bundle branch block)    S/P implantation of automatic cardioverter/defibrillator (AICD)    Chronic systolic congestive heart failure (HCC)    S/P CABG x 3    Dyslipidemia          PLAN:     1.  HPL:  side effects on repatha and statins.    remain on praluent.        2. CAD s/p CABG:   on coreg, ASA   The patient has been instructed to call with any angina or equivalent as reviewed today. All

## 2025-03-05 RX ORDER — CARVEDILOL 6.25 MG/1
6.25 TABLET ORAL 2 TIMES DAILY WITH MEALS
Qty: 180 TABLET | Refills: 3 | Status: SHIPPED | OUTPATIENT
Start: 2025-03-05

## 2025-03-05 NOTE — TELEPHONE ENCOUNTER
Requested Prescriptions     Pending Prescriptions Disp Refills    carvedilol (COREG) 6.25 MG tablet 180 tablet 3     Sig: Take 1 tablet by mouth 2 times daily (with meals)     Rx verified. Last office visit 2/18/25.

## 2025-03-12 RX ORDER — CARVEDILOL 6.25 MG/1
6.25 TABLET ORAL 2 TIMES DAILY WITH MEALS
Qty: 180 TABLET | Refills: 3 | Status: SHIPPED | OUTPATIENT
Start: 2025-03-12

## 2025-03-12 NOTE — TELEPHONE ENCOUNTER
Requested Prescriptions     Pending Prescriptions Disp Refills    carvedilol (COREG) 6.25 MG tablet 180 tablet 3     Sig: Take 1 tablet by mouth 2 times daily (with meals)     Verified rx. Last OV 02/18/25. Erx to pharm on file.    Rx sent to the wrong pharmacy.

## 2025-03-12 NOTE — TELEPHONE ENCOUNTER
MEDICATION REFILL REQUEST      Name of Medication:  carvedilol  Dose:  6.25 mg  Frequency:  BID  Quantity:  180  Days' supply:  90with 3 refills      Pharmacy Name/Location:  [phone]    Pt said she requested on 3-5-25  but the pharmacy never received anything

## 2025-03-24 PROCEDURE — 93296 REM INTERROG EVL PM/IDS: CPT | Performed by: INTERNAL MEDICINE

## 2025-03-24 PROCEDURE — 93295 DEV INTERROG REMOTE 1/2/MLT: CPT | Performed by: INTERNAL MEDICINE

## 2025-03-25 ENCOUNTER — OFFICE VISIT (OUTPATIENT)
Dept: INTERNAL MEDICINE CLINIC | Facility: CLINIC | Age: 70
End: 2025-03-25
Payer: MEDICARE

## 2025-03-25 VITALS
OXYGEN SATURATION: 97 % | HEIGHT: 66 IN | HEART RATE: 60 BPM | WEIGHT: 146.38 LBS | SYSTOLIC BLOOD PRESSURE: 131 MMHG | BODY MASS INDEX: 23.53 KG/M2 | DIASTOLIC BLOOD PRESSURE: 64 MMHG | TEMPERATURE: 97.2 F

## 2025-03-25 DIAGNOSIS — R53.82 CHRONIC FATIGUE: ICD-10-CM

## 2025-03-25 DIAGNOSIS — Z00.00 MEDICARE ANNUAL WELLNESS VISIT, SUBSEQUENT: Primary | ICD-10-CM

## 2025-03-25 DIAGNOSIS — N32.81 OVERACTIVE BLADDER: ICD-10-CM

## 2025-03-25 DIAGNOSIS — I42.0 DILATED CARDIOMYOPATHY (HCC): ICD-10-CM

## 2025-03-25 DIAGNOSIS — M85.89 OSTEOPENIA OF MULTIPLE SITES: ICD-10-CM

## 2025-03-25 DIAGNOSIS — N30.10 INTERSTITIAL CYSTITIS: ICD-10-CM

## 2025-03-25 DIAGNOSIS — E78.5 DYSLIPIDEMIA: ICD-10-CM

## 2025-03-25 DIAGNOSIS — E04.2 MULTIPLE THYROID NODULES: ICD-10-CM

## 2025-03-25 DIAGNOSIS — K58.9 IRRITABLE BOWEL SYNDROME, UNSPECIFIED TYPE: ICD-10-CM

## 2025-03-25 DIAGNOSIS — F41.9 ANXIETY: ICD-10-CM

## 2025-03-25 DIAGNOSIS — I25.10 CORONARY ARTERY DISEASE INVOLVING NATIVE CORONARY ARTERY OF NATIVE HEART WITHOUT ANGINA PECTORIS: ICD-10-CM

## 2025-03-25 DIAGNOSIS — K21.9 GASTROESOPHAGEAL REFLUX DISEASE WITHOUT ESOPHAGITIS: ICD-10-CM

## 2025-03-25 DIAGNOSIS — Z95.810 S/P IMPLANTATION OF AUTOMATIC CARDIOVERTER/DEFIBRILLATOR (AICD): ICD-10-CM

## 2025-03-25 DIAGNOSIS — I50.22 CHRONIC SYSTOLIC CONGESTIVE HEART FAILURE (HCC): ICD-10-CM

## 2025-03-25 LAB
25(OH)D3 SERPL-MCNC: 31.8 NG/ML (ref 30–100)
BASOPHILS # BLD: 0.04 K/UL (ref 0–0.2)
BASOPHILS NFR BLD: 1 % (ref 0–2)
DIFFERENTIAL METHOD BLD: ABNORMAL
EOSINOPHIL # BLD: 0.22 K/UL (ref 0–0.8)
EOSINOPHIL NFR BLD: 5.6 % (ref 0.5–7.8)
ERYTHROCYTE [DISTWIDTH] IN BLOOD BY AUTOMATED COUNT: 13.2 % (ref 11.9–14.6)
HCT VFR BLD AUTO: 40.2 % (ref 35.8–46.3)
HGB BLD-MCNC: 13.2 G/DL (ref 11.7–15.4)
IMM GRANULOCYTES # BLD AUTO: 0.02 K/UL (ref 0–0.5)
IMM GRANULOCYTES NFR BLD AUTO: 0.5 % (ref 0–5)
LYMPHOCYTES # BLD: 1.28 K/UL (ref 0.5–4.6)
LYMPHOCYTES NFR BLD: 32.7 % (ref 13–44)
MCH RBC QN AUTO: 30.6 PG (ref 26.1–32.9)
MCHC RBC AUTO-ENTMCNC: 32.8 G/DL (ref 31.4–35)
MCV RBC AUTO: 93.3 FL (ref 82–102)
MONOCYTES # BLD: 0.36 K/UL (ref 0.1–1.3)
MONOCYTES NFR BLD: 9.2 % (ref 4–12)
NEUTS SEG # BLD: 1.99 K/UL (ref 1.7–8.2)
NEUTS SEG NFR BLD: 51 % (ref 43–78)
NRBC # BLD: 0 K/UL (ref 0–0.2)
PLATELET # BLD AUTO: 151 K/UL (ref 150–450)
PMV BLD AUTO: 12.7 FL (ref 9.4–12.3)
RBC # BLD AUTO: 4.31 M/UL (ref 4.05–5.2)
TSH W FREE THYROID IF ABNORMAL: 1.04 UIU/ML (ref 0.27–4.2)
VIT B12 SERPL-MCNC: 483 PG/ML (ref 193–986)
WBC # BLD AUTO: 3.9 K/UL (ref 4.3–11.1)

## 2025-03-25 PROCEDURE — G0439 PPPS, SUBSEQ VISIT: HCPCS | Performed by: NURSE PRACTITIONER

## 2025-03-25 PROCEDURE — G8399 PT W/DXA RESULTS DOCUMENT: HCPCS | Performed by: NURSE PRACTITIONER

## 2025-03-25 PROCEDURE — 1090F PRES/ABSN URINE INCON ASSESS: CPT | Performed by: NURSE PRACTITIONER

## 2025-03-25 PROCEDURE — G8420 CALC BMI NORM PARAMETERS: HCPCS | Performed by: NURSE PRACTITIONER

## 2025-03-25 PROCEDURE — 3017F COLORECTAL CA SCREEN DOC REV: CPT | Performed by: NURSE PRACTITIONER

## 2025-03-25 PROCEDURE — 1159F MED LIST DOCD IN RCRD: CPT | Performed by: NURSE PRACTITIONER

## 2025-03-25 PROCEDURE — 1160F RVW MEDS BY RX/DR IN RCRD: CPT | Performed by: NURSE PRACTITIONER

## 2025-03-25 PROCEDURE — 1123F ACP DISCUSS/DSCN MKR DOCD: CPT | Performed by: NURSE PRACTITIONER

## 2025-03-25 PROCEDURE — G8427 DOCREV CUR MEDS BY ELIG CLIN: HCPCS | Performed by: NURSE PRACTITIONER

## 2025-03-25 PROCEDURE — G2211 COMPLEX E/M VISIT ADD ON: HCPCS | Performed by: NURSE PRACTITIONER

## 2025-03-25 PROCEDURE — 99214 OFFICE O/P EST MOD 30 MIN: CPT | Performed by: NURSE PRACTITIONER

## 2025-03-25 PROCEDURE — 1036F TOBACCO NON-USER: CPT | Performed by: NURSE PRACTITIONER

## 2025-03-25 RX ORDER — SERTRALINE HYDROCHLORIDE 25 MG/1
25 TABLET, FILM COATED ORAL DAILY
Qty: 30 TABLET | Refills: 1 | Status: SHIPPED | OUTPATIENT
Start: 2025-03-25

## 2025-03-25 RX ORDER — MUPIROCIN 20 MG/G
OINTMENT TOPICAL
COMMUNITY
Start: 2025-01-06

## 2025-03-25 RX ORDER — PANTOPRAZOLE SODIUM 20 MG/1
20 TABLET, DELAYED RELEASE ORAL
Qty: 90 TABLET | Refills: 1 | Status: SHIPPED | OUTPATIENT
Start: 2025-03-25

## 2025-03-25 SDOH — ECONOMIC STABILITY: FOOD INSECURITY: WITHIN THE PAST 12 MONTHS, YOU WORRIED THAT YOUR FOOD WOULD RUN OUT BEFORE YOU GOT MONEY TO BUY MORE.: NEVER TRUE

## 2025-03-25 SDOH — ECONOMIC STABILITY: FOOD INSECURITY: WITHIN THE PAST 12 MONTHS, THE FOOD YOU BOUGHT JUST DIDN'T LAST AND YOU DIDN'T HAVE MONEY TO GET MORE.: NEVER TRUE

## 2025-03-25 ASSESSMENT — PATIENT HEALTH QUESTIONNAIRE - PHQ9
SUM OF ALL RESPONSES TO PHQ QUESTIONS 1-9: 0
1. LITTLE INTEREST OR PLEASURE IN DOING THINGS: NOT AT ALL
SUM OF ALL RESPONSES TO PHQ QUESTIONS 1-9: 0
2. FEELING DOWN, DEPRESSED OR HOPELESS: NOT AT ALL

## 2025-03-25 NOTE — PROGRESS NOTES
(BACTROBAN) 2 % ointment   Provider, MD Alex   trospium (SANCTURA) 20 MG tablet Take 1 tablet by mouth 2 times daily  Patient not taking: Reported on 3/25/2025  Osiris Moffett APRN - CNP       CareTeam (Including outside providers/suppliers regularly involved in providing care):   Patient Care Team:  Jocelin Clements APRN - CNP as PCP - General  Jocelin Clements APRN - CNP as PCP - Empaneled Provider  Kong De Anda MD as Physician  Francisco, Saw Chaparro MD as Physician  Marci Ruiz MD as Physician  Jaxon Mcdonald DO as Consulting Physician (Cardiovascular Disease)     Recommendations for Preventive Services Due: see orders and patient instructions/AVS.  Recommended screening schedule for the next 5-10 years is provided to the patient in written form: see Patient Instructions/AVS.     Reviewed and updated this visit:  Tobacco  Allergies  Meds  Problems  Med Hx  Surg Hx  Fam Hx  Sexual   Hx

## 2025-03-25 NOTE — PATIENT INSTRUCTIONS
have a serious illness that gets worse over time or can't be cured?  What are you most afraid of that might happen? (Maybe you're afraid of having pain, losing your independence, or being kept alive by machines.)  Where would you prefer to die? (Your home? A hospital? A nursing home?)  Do you want to donate your organs when you die?  Do you want certain Faith practices performed before you die?  When should you call for help?  Be sure to contact your doctor if you have any questions.  Where can you learn more?  Go to https://www.[employer].net/patientEd and enter R264 to learn more about \"Advance Directives: Care Instructions.\"  Current as of: March 1, 2024  Content Version: 14.4  © 0161-8196 [employer].   Care instructions adapted under license by [employer]. If you have questions about a medical condition or this instruction, always ask your healthcare professional. [employer] disclaims any warranty or liability for your use of this information.         A Healthy Heart: Care Instructions  Overview     Coronary artery disease, also called heart disease, occurs when a substance called plaque builds up in the vessels that supply oxygen-rich blood to your heart muscle. This can narrow the blood vessels and reduce blood flow. A heart attack happens when blood flow is completely blocked. A high-fat diet, smoking, and other factors increase the risk of heart disease.  Your doctor has found that you have a chance of having heart disease. A heart-healthy lifestyle can help keep your heart healthy and prevent heart disease. This lifestyle includes eating healthy, being active, staying at a weight that's healthy for you, and not smoking or using tobacco. It also includes taking medicines as directed, managing other health conditions, and trying to get a healthy amount of sleep.  Follow-up care is a key part of your treatment and safety. Be sure to make and go to all appointments, and call your

## 2025-03-26 ASSESSMENT — ENCOUNTER SYMPTOMS
SHORTNESS OF BREATH: 0
COUGH: 0

## 2025-03-31 ENCOUNTER — RESULTS FOLLOW-UP (OUTPATIENT)
Dept: INTERNAL MEDICINE CLINIC | Facility: CLINIC | Age: 70
End: 2025-03-31

## 2025-03-31 DIAGNOSIS — D72.819 LEUKOPENIA, UNSPECIFIED TYPE: Primary | ICD-10-CM

## 2025-04-01 ENCOUNTER — LAB (OUTPATIENT)
Dept: INTERNAL MEDICINE CLINIC | Facility: CLINIC | Age: 70
End: 2025-04-01

## 2025-04-01 DIAGNOSIS — D72.819 LEUKOPENIA, UNSPECIFIED TYPE: ICD-10-CM

## 2025-04-01 LAB
BASOPHILS # BLD: 0.03 K/UL (ref 0–0.2)
BASOPHILS NFR BLD: 0.7 % (ref 0–2)
DIFFERENTIAL METHOD BLD: ABNORMAL
EOSINOPHIL # BLD: 0.2 K/UL (ref 0–0.8)
EOSINOPHIL NFR BLD: 4.4 % (ref 0.5–7.8)
ERYTHROCYTE [DISTWIDTH] IN BLOOD BY AUTOMATED COUNT: 13 % (ref 11.9–14.6)
FOLATE SERPL-MCNC: 10.4 NG/ML (ref 3.1–17.5)
HCT VFR BLD AUTO: 40.5 % (ref 35.8–46.3)
HGB BLD-MCNC: 13.1 G/DL (ref 11.7–15.4)
IMM GRANULOCYTES # BLD AUTO: 0.01 K/UL (ref 0–0.5)
IMM GRANULOCYTES NFR BLD AUTO: 0.2 % (ref 0–5)
LYMPHOCYTES # BLD: 1.42 K/UL (ref 0.5–4.6)
LYMPHOCYTES NFR BLD: 31.1 % (ref 13–44)
MCH RBC QN AUTO: 29.9 PG (ref 26.1–32.9)
MCHC RBC AUTO-ENTMCNC: 32.3 G/DL (ref 31.4–35)
MCV RBC AUTO: 92.5 FL (ref 82–102)
MONOCYTES # BLD: 0.58 K/UL (ref 0.1–1.3)
MONOCYTES NFR BLD: 12.7 % (ref 4–12)
NEUTS SEG # BLD: 2.33 K/UL (ref 1.7–8.2)
NEUTS SEG NFR BLD: 50.9 % (ref 43–78)
NRBC # BLD: 0 K/UL (ref 0–0.2)
PLATELET # BLD AUTO: 156 K/UL (ref 150–450)
PMV BLD AUTO: 12.7 FL (ref 9.4–12.3)
RBC # BLD AUTO: 4.38 M/UL (ref 4.05–5.2)
WBC # BLD AUTO: 4.6 K/UL (ref 4.3–11.1)

## 2025-04-02 ENCOUNTER — RESULTS FOLLOW-UP (OUTPATIENT)
Dept: INTERNAL MEDICINE CLINIC | Facility: CLINIC | Age: 70
End: 2025-04-02

## 2025-04-02 LAB — PATH REV BLD -IMP: NORMAL

## 2025-04-04 LAB — COPPER SERPL-MCNC: 111 UG/DL (ref 80–158)

## 2025-05-08 ENCOUNTER — TELEPHONE (OUTPATIENT)
Dept: PHARMACY | Facility: CLINIC | Age: 70
End: 2025-05-08

## 2025-05-08 DIAGNOSIS — I50.22 CHRONIC SYSTOLIC CONGESTIVE HEART FAILURE (HCC): Primary | ICD-10-CM

## 2025-05-08 NOTE — TELEPHONE ENCOUNTER
[facility] CLINICAL PHARMACY: HEART FAILURE TREATMENT REVIEW  As part of [facility]'s efforts to reduce heart failure (HF) treatment gaps across the healthcare continuum, the organization is focused on improving alignment with guideline-directed medical therapy (GDMT) for heart failure with reduced ejection fraction (HFrEF).    Last Echo: 07/31/23    TRANSTHORACIC ECHOCARDIOGRAM (TTE) COMPLETE (CONTRAST/BUBBLE/3D PRN) 08/01/2023  6:35 AM (Final)    Interpretation Summary    Left Ventricle: Normal left ventricular systolic function with a visually estimated EF of 50 - 55%. Left ventricle size is normal. Normal wall thickness. Normal wall motion. Normal diastolic function.    Aortic Valve: Trileaflet valve. Sclerosis of the aortic valve cusp.    Mitral Valve: Mildly thickened leaflet. Mild regurgitation.    Tricuspid Valve: The estimated RVSP is 20 mmHg.    Aorta: Ao root diameter is 2.9 cm. Ao Root Index is 1.70 cm/m2.    Signed by: Jean Carlos Ford MD on 8/1/2023  6:35 AM  PLAN  The following are interventions that have been identified:  HFrEF- patient has updated EF from 2023 that is 50% and report still showing EF from 2018. Will updated EF    Future Appointments   Date Time Provider Department Center   5/12/2025  9:30 AM Jean Carlos Bullard MD END [facility]   6/16/2025  9:15 AM Osiris Moffett APRN - CNP [id number] [facility]   8/18/2025 10:00 AM Jaxon Mcdonald,  UCDG [facility]   9/30/2025  9:30 AM Jocelin Clements APRN - CNP [facility] ECC DEP       Sol Fisher, PharmD, BCPS  [facility]  [facility] Clinical Pharmacist  Department: [phone]     For Pharmacy Admin Tracking Only    Program: [facility]  Gap Closed?: Yes   Time Spent (min): 10

## 2025-05-09 NOTE — PROGRESS NOTES
GUANAKITO Bullard MD, FACE    [facility] ENDOCRINOLOGY   AND   THYROID NODULE CLINIC            Reason for visit: Follow-up of thyroid nodules      ASSESSMENT AND PLAN:    1. Multiple thyroid nodules  Prior biopsy of the dominant TR 4 nodule of the left lower pole in November 2019 was benign.  Ultrasound was repeated today and is unchanged.  Per ACR criteria, ongoing ultrasound follow-up is not necessary.  She can be followed henceforth with physical examination.  If her exam changes, please let me know and I will be happy to repeat her ultrasound.  Otherwise, follow-up with me is not necessary.      PROCEDURES:    HEAD AND NECK ULTRASOUND    Date of study:  5/12/2025    Performing/interpreting physician:  GUANAIKTO Bullard MD, FACE    Indication:  thyroid nodule    Technique:  Using a 12 MHz linear transducer, multiple real-time planar images were obtained of the thyroid and surrounding tissues.      Findings:  Right lobe 1.52 x 1.38 x 2.86 cm, isthmus 0.09 cm, left lobe 1.41 x 1.26 x 3.62 cm.  Homogeneous echotexture.  Normal blood flow.  0.44 x 0.45 x 0.53 cm hypoechoic nodule with macrocalcifications but no microcalcifications or increased blood flow in the midportion of the right lobe (TR 4).  1.16 x 1.29 x 1.40 cm isoechoic nodule without calcifications or increased blood flow in the midportion of the left lobe (TR 3).  0.86 x 0.89 x 0.89 cm isoechoic nodule with macrocalcifications but no microcalcifications or increased blood flow at the left lower pole (TR 4).    Impression: Bilateral thyroid nodules as noted.    American College of Radiology TI-RADS recommendations:   TR1: Benign, no FNA or ultrasound follow-up   TR2: Nonsuspicious, no FNA or ultrasound follow-up   TR3: Mildly suspicious, FNA if greater than or equal to 2.5 cm, follow with ultrasound at 1, 3,   and 5 years if greater than or equal to 1.5 cm.   TR4: Moderately suspicious, FNA if greater than or equal to 1.5 cm, follow with ultrasound at 1,

## 2025-05-12 ENCOUNTER — OFFICE VISIT (OUTPATIENT)
Dept: ENDOCRINOLOGY | Age: 70
End: 2025-05-12
Payer: MEDICARE

## 2025-05-12 VITALS
BODY MASS INDEX: 22.98 KG/M2 | OXYGEN SATURATION: 98 % | SYSTOLIC BLOOD PRESSURE: 124 MMHG | HEIGHT: 66 IN | WEIGHT: 143 LBS | DIASTOLIC BLOOD PRESSURE: 82 MMHG | HEART RATE: 68 BPM

## 2025-05-12 DIAGNOSIS — E04.2 MULTIPLE THYROID NODULES: Primary | ICD-10-CM

## 2025-05-12 PROCEDURE — G8420 CALC BMI NORM PARAMETERS: HCPCS | Performed by: INTERNAL MEDICINE

## 2025-05-12 PROCEDURE — G8399 PT W/DXA RESULTS DOCUMENT: HCPCS | Performed by: INTERNAL MEDICINE

## 2025-05-12 PROCEDURE — 99213 OFFICE O/P EST LOW 20 MIN: CPT | Performed by: INTERNAL MEDICINE

## 2025-05-12 PROCEDURE — 76536 US EXAM OF HEAD AND NECK: CPT | Performed by: INTERNAL MEDICINE

## 2025-05-12 PROCEDURE — 1090F PRES/ABSN URINE INCON ASSESS: CPT | Performed by: INTERNAL MEDICINE

## 2025-05-12 PROCEDURE — 1123F ACP DISCUSS/DSCN MKR DOCD: CPT | Performed by: INTERNAL MEDICINE

## 2025-05-12 PROCEDURE — 1036F TOBACCO NON-USER: CPT | Performed by: INTERNAL MEDICINE

## 2025-05-12 PROCEDURE — G8427 DOCREV CUR MEDS BY ELIG CLIN: HCPCS | Performed by: INTERNAL MEDICINE

## 2025-05-12 PROCEDURE — 1159F MED LIST DOCD IN RCRD: CPT | Performed by: INTERNAL MEDICINE

## 2025-05-12 PROCEDURE — 3017F COLORECTAL CA SCREEN DOC REV: CPT | Performed by: INTERNAL MEDICINE

## 2025-05-12 ASSESSMENT — ENCOUNTER SYMPTOMS: TROUBLE SWALLOWING: 0

## 2025-06-05 ENCOUNTER — TELEPHONE (OUTPATIENT)
Age: 70
End: 2025-06-05

## 2025-06-05 NOTE — TELEPHONE ENCOUNTER
PT called to inform that her INS was faxing over an Authorization from [facility] for Medication..

## 2025-06-16 ENCOUNTER — OFFICE VISIT (OUTPATIENT)
Dept: UROLOGY | Age: 70
End: 2025-06-16
Payer: MEDICARE

## 2025-06-16 DIAGNOSIS — N39.0 RECURRENT UTI: ICD-10-CM

## 2025-06-16 DIAGNOSIS — N95.2 VAGINAL ATROPHY: ICD-10-CM

## 2025-06-16 DIAGNOSIS — N32.81 OVERACTIVE BLADDER: Primary | ICD-10-CM

## 2025-06-16 DIAGNOSIS — N89.8 VAGINAL IRRITATION: ICD-10-CM

## 2025-06-16 DIAGNOSIS — N13.5 OBSTRUCTION OF LEFT URETEROPELVIC JUNCTION (UPJ): ICD-10-CM

## 2025-06-16 LAB
BILIRUBIN, URINE, POC: NEGATIVE
BLOOD URINE, POC: NEGATIVE
GLUCOSE URINE, POC: NEGATIVE MG/DL
KETONES, URINE, POC: NEGATIVE MG/DL
LEUKOCYTE ESTERASE, URINE, POC: NEGATIVE
NITRITE, URINE, POC: NEGATIVE
PH, URINE, POC: 6 (ref 4.6–8)
PROTEIN,URINE, POC: NEGATIVE MG/DL
SPECIFIC GRAVITY, URINE, POC: 1.01 (ref 1–1.03)
URINALYSIS CLARITY, POC: NORMAL
URINALYSIS COLOR, POC: NORMAL
UROBILINOGEN, POC: NORMAL MG/DL

## 2025-06-16 PROCEDURE — 1159F MED LIST DOCD IN RCRD: CPT | Performed by: NURSE PRACTITIONER

## 2025-06-16 PROCEDURE — G8427 DOCREV CUR MEDS BY ELIG CLIN: HCPCS | Performed by: NURSE PRACTITIONER

## 2025-06-16 PROCEDURE — 1090F PRES/ABSN URINE INCON ASSESS: CPT | Performed by: NURSE PRACTITIONER

## 2025-06-16 PROCEDURE — 1036F TOBACCO NON-USER: CPT | Performed by: NURSE PRACTITIONER

## 2025-06-16 PROCEDURE — 1160F RVW MEDS BY RX/DR IN RCRD: CPT | Performed by: NURSE PRACTITIONER

## 2025-06-16 PROCEDURE — 99214 OFFICE O/P EST MOD 30 MIN: CPT | Performed by: NURSE PRACTITIONER

## 2025-06-16 PROCEDURE — 81003 URINALYSIS AUTO W/O SCOPE: CPT | Performed by: NURSE PRACTITIONER

## 2025-06-16 PROCEDURE — G8399 PT W/DXA RESULTS DOCUMENT: HCPCS | Performed by: NURSE PRACTITIONER

## 2025-06-16 PROCEDURE — G8420 CALC BMI NORM PARAMETERS: HCPCS | Performed by: NURSE PRACTITIONER

## 2025-06-16 PROCEDURE — 3017F COLORECTAL CA SCREEN DOC REV: CPT | Performed by: NURSE PRACTITIONER

## 2025-06-16 PROCEDURE — 1123F ACP DISCUSS/DSCN MKR DOCD: CPT | Performed by: NURSE PRACTITIONER

## 2025-06-16 RX ORDER — VIBEGRON 75 MG/1
75 TABLET, FILM COATED ORAL DAILY
Qty: 30 TABLET | Refills: 0
Start: 2025-06-16

## 2025-06-16 RX ORDER — ESTRADIOL 0.1 MG/G
1 CREAM VAGINAL
Qty: 42.5 G | Refills: 3 | Status: SHIPPED | OUTPATIENT
Start: 2025-06-16

## 2025-06-16 RX ORDER — NYSTATIN AND TRIAMCINOLONE ACETONIDE 100000; 1 [USP'U]/G; MG/G
CREAM TOPICAL 2 TIMES DAILY
Qty: 30 G | Refills: 2 | Status: SHIPPED | OUTPATIENT
Start: 2025-06-16

## 2025-06-16 ASSESSMENT — ENCOUNTER SYMPTOMS
BACK PAIN: 0
NAUSEA: 0

## 2025-06-16 NOTE — PROGRESS NOTES
[facility]  [address] [address]  [phone]          Vickie Neumann  : 1955    Chief Complaint   Patient presents with   • Follow-up          HPI     Vickie Neumann is a 69 y.o. female who is followed for a L UPJO, OAB, recurrent UTI, and vaginal atrophy.  Pt reports being diagnosed with this in mid 30's and elected for observation.  This was rediscovered again after a recent fall and sacral fx.  CT on 3/22/22 shows a L UPJO with likely crossing vessels.  MAG-3 completed on 22 shows a half time lasix washout of greater than 30min with the L kidney contributing 45% of renal function.  Stable renal function. No flank pain. She and Dr Mendosa have elected for no surgical intervention. No flank pain.     Lab Results   Component Value Date    CREATININE 0.68 02/10/2025    CREATININE 0.71 2024    CREATININE 0.70 2024       Also has h/o OAB. Prev followed by Dr Owen. Failed to improve w Enablex and Vesicare. Also had dry mouth w medications. Only mild improvement w pelvic floor therapy. Cont to struggle w daytime frequency of multiple times per hour, nocturia x 4x/night, and SIGNIFICANT UUI. Wears a pad at all times. She did not try the Myrbetriq samples she was given in . Started Myrbetriq 2023. This had helped bu caused nausea. Most recently on trospium. Had to stop this d/t nausea.      Prev on premarin cream for vaginal atrophy. This was restarted in . Changed to estradiol d/t insurance coverage.      Prev h/o recurrent UTI. No problem now. No h/o renal stones.          Past Medical History:   Diagnosis Date   • Anxiety disorder    • Basal cell carcinoma of skin    • Chronic insomnia    • Coronary artery disease    • COVID-19 2020    mild case    • Dyslipidemia    • Fibromyalgia    • Gastroesophageal reflux disease    • Interstitial cystitis    • Irritable bowel syndrome    • Ischemic colitis    • Multiple thyroid

## 2025-06-18 ENCOUNTER — TELEPHONE (OUTPATIENT)
Dept: UROLOGY | Age: 70
End: 2025-06-18

## 2025-06-18 RX ORDER — TRIAMCINOLONE ACETONIDE 0.25 MG/G
CREAM TOPICAL 2 TIMES DAILY
Qty: 15 G | Refills: 1 | Status: SHIPPED | OUTPATIENT
Start: 2025-06-18

## 2025-06-18 RX ORDER — NYSTATIN 100000 U/G
OINTMENT TOPICAL
Qty: 15 G | Refills: 1 | Status: SHIPPED | OUTPATIENT
Start: 2025-06-18

## 2025-06-18 NOTE — TELEPHONE ENCOUNTER
Insurance denied the combination cream. They will cover each medication separately. I sent nystatin cream AND triamcinolone cream. Mix a pea size amount of each prior to use.    Osiris Moffett, APRN - CNP

## 2025-06-18 NOTE — TELEPHONE ENCOUNTER
[facility]olog is requiring PA due to step therapy. Did not see formulary alternatives had been tried and failed in chart.    Per the PA form:    A: Has the member had an inadequate response to TWO of the following topical formulary alternatives: one preferred nystatin formulation (cream, ointment, powder), one preferred clotrimazole formulation (solution, cream), or ketoconazole cream?

## 2025-06-19 NOTE — TELEPHONE ENCOUNTER
I contacted pharmacy services to check the status of the Appeal for Praluent.  I was told the Appeal was approved 6/7/2025 until further notice. Case # [id number].     Patient was notified & states she will  her rx today.

## 2025-06-23 PROCEDURE — 93296 REM INTERROG EVL PM/IDS: CPT | Performed by: INTERNAL MEDICINE

## 2025-06-23 PROCEDURE — 93295 DEV INTERROG REMOTE 1/2/MLT: CPT | Performed by: INTERNAL MEDICINE

## 2025-07-29 ENCOUNTER — OFFICE VISIT (OUTPATIENT)
Dept: INTERNAL MEDICINE CLINIC | Facility: CLINIC | Age: 70
End: 2025-07-29
Payer: MEDICARE

## 2025-07-29 ENCOUNTER — HOSPITAL ENCOUNTER (OUTPATIENT)
Dept: GENERAL RADIOLOGY | Age: 70
Discharge: HOME | End: 2025-07-31
Payer: MEDICARE

## 2025-07-29 VITALS
DIASTOLIC BLOOD PRESSURE: 74 MMHG | BODY MASS INDEX: 23.14 KG/M2 | SYSTOLIC BLOOD PRESSURE: 128 MMHG | TEMPERATURE: 98 F | HEART RATE: 75 BPM | WEIGHT: 144 LBS | HEIGHT: 66 IN

## 2025-07-29 DIAGNOSIS — S99.911A INJURY OF RIGHT ANKLE, INITIAL ENCOUNTER: ICD-10-CM

## 2025-07-29 DIAGNOSIS — S99.911A INJURY OF RIGHT ANKLE, INITIAL ENCOUNTER: Primary | ICD-10-CM

## 2025-07-29 PROCEDURE — G8420 CALC BMI NORM PARAMETERS: HCPCS | Performed by: NURSE PRACTITIONER

## 2025-07-29 PROCEDURE — 1123F ACP DISCUSS/DSCN MKR DOCD: CPT | Performed by: NURSE PRACTITIONER

## 2025-07-29 PROCEDURE — 1090F PRES/ABSN URINE INCON ASSESS: CPT | Performed by: NURSE PRACTITIONER

## 2025-07-29 PROCEDURE — 3017F COLORECTAL CA SCREEN DOC REV: CPT | Performed by: NURSE PRACTITIONER

## 2025-07-29 PROCEDURE — 73630 X-RAY EXAM OF FOOT: CPT

## 2025-07-29 PROCEDURE — 99213 OFFICE O/P EST LOW 20 MIN: CPT | Performed by: NURSE PRACTITIONER

## 2025-07-29 PROCEDURE — 73610 X-RAY EXAM OF ANKLE: CPT

## 2025-07-29 PROCEDURE — G8427 DOCREV CUR MEDS BY ELIG CLIN: HCPCS | Performed by: NURSE PRACTITIONER

## 2025-07-29 PROCEDURE — G8399 PT W/DXA RESULTS DOCUMENT: HCPCS | Performed by: NURSE PRACTITIONER

## 2025-07-29 PROCEDURE — 1036F TOBACCO NON-USER: CPT | Performed by: NURSE PRACTITIONER

## 2025-07-29 PROCEDURE — 1159F MED LIST DOCD IN RCRD: CPT | Performed by: NURSE PRACTITIONER

## 2025-07-29 RX ORDER — HYDROCODONE BITARTRATE AND ACETAMINOPHEN 5; 325 MG/1; MG/1
1 TABLET ORAL 2 TIMES DAILY PRN
Qty: 10 TABLET | Refills: 0 | Status: SHIPPED | OUTPATIENT
Start: 2025-07-29 | End: 2025-08-03

## 2025-07-29 ASSESSMENT — ENCOUNTER SYMPTOMS
SHORTNESS OF BREATH: 0
COUGH: 0

## 2025-07-29 NOTE — PROGRESS NOTES
[facility]  Office Visit Note    Subjective:  Chief Complaint   Patient presents with    Ankle Injury     Pt states she accidentally twisted her right ankle a week ago. Pt has bruising and swelling from her ankle to her feet.       Patient ID: Vickie Neumann is a 69 y.o. female presenting to the office for the above.    69-year-old female with ankle pain.   Injury occurred 10 days ago.  She was going down the last step of a set of stairs and states her right ankle rolled to the outside.  She did not fall.   She developed swelling of ankle and foot, and bruising from toes to mid-lower leg.  She states the swelling and bruising are improving.  She is continuing to have significant pain and difficulty walking. Pain is severe at times, keeping her awake at night.   Exam reveals swelling and bruising of right ankle. Bruising extends prison up her right leg.  Also some bruising to top of foot and 2nd/3rd/4th toes.  She is tender to palpation across top of foot, medial ankle, and lower leg.  Ambulating without assistance, but with discomfort.   --Check xray today.   --Discussed conservative therapies.   --Norco prn for pain; Discussed risks/benefits, alternatives, potential side effects, proper administration of medication.   --Referral placed to orthopedics as pain and ability to walk are not improving after 10 days.  Advised patient to contact office if they do not hear from the referral in the next 7-10 days; they express understanding.   --Advised of symptoms that would require reevaluation, or that would require immediate medical attention in the ER; patient expresses understanding.                    Previous history for reference:     .  Has a son, stepdaughter, and several grandchildren.  She is a hairdresser, semi-retired; also works at [employer].      CAD / systolic CHF / dilated cardiomyopathy / Dyslipidemia--  Follows with [facility], Dr. Mcdonald.   Status post CABG x3 (LIMA to

## 2025-07-31 ENCOUNTER — OFFICE VISIT (OUTPATIENT)
Dept: ORTHOPEDIC SURGERY | Age: 70
End: 2025-07-31
Payer: MEDICARE

## 2025-07-31 DIAGNOSIS — M89.8X6 PAIN OF RIGHT TIBIA: ICD-10-CM

## 2025-07-31 DIAGNOSIS — S82.61XA CLOSED LOW LATERAL MALLEOLUS FRACTURE, RIGHT, INITIAL ENCOUNTER: Primary | ICD-10-CM

## 2025-07-31 PROCEDURE — 3017F COLORECTAL CA SCREEN DOC REV: CPT | Performed by: PHYSICIAN ASSISTANT

## 2025-07-31 PROCEDURE — 1036F TOBACCO NON-USER: CPT | Performed by: PHYSICIAN ASSISTANT

## 2025-07-31 PROCEDURE — G8399 PT W/DXA RESULTS DOCUMENT: HCPCS | Performed by: PHYSICIAN ASSISTANT

## 2025-07-31 PROCEDURE — G8428 CUR MEDS NOT DOCUMENT: HCPCS | Performed by: PHYSICIAN ASSISTANT

## 2025-07-31 PROCEDURE — 1123F ACP DISCUSS/DSCN MKR DOCD: CPT | Performed by: PHYSICIAN ASSISTANT

## 2025-07-31 PROCEDURE — 1090F PRES/ABSN URINE INCON ASSESS: CPT | Performed by: PHYSICIAN ASSISTANT

## 2025-07-31 PROCEDURE — 99203 OFFICE O/P NEW LOW 30 MIN: CPT | Performed by: PHYSICIAN ASSISTANT

## 2025-07-31 PROCEDURE — G8420 CALC BMI NORM PARAMETERS: HCPCS | Performed by: PHYSICIAN ASSISTANT

## 2025-07-31 NOTE — PROGRESS NOTES
[facility]          Patient ID:  Name: Vickie Neumann  AGE/Gender: 69 y.o. female  MRN: [id number]  : 1955    Date of Consultation:  2025          ALLERGIES:   Allergies   Allergen Reactions    Codeine Nausea Only    Conjugated Estrogens Palpitations     Patient does not know why this is on the list    Doxycycline Nausea Only    Levofloxacin Nausea And Vomiting    Penicillins Rash    Sulfa Antibiotics Headaches        CC: Left ankle pain    History:  The patient presents today as a new patient complaining of left ankle pain after rolling her ankle walking down steps 2 weeks ago.  She thought this would get better on its own but she just sprained her ankle.  She saw her primary care provider on the .  They obtained x-rays.  These were read by radiology and she was found to have a lateral malleolus fracture.  Her primary care provider reached out to our office to see if we can get her seen right away.  She the patient localizes pain to the right lateral ankle.  She has continued to walk on this despite the pain.  She takes some hydrocodone at night to help her sleep because of the pain.  They have no other complaints or concerns.    Review of Systems:  Pertinent items are noted in HPI.    Past Medical History Includes:   Past Medical History:   Diagnosis Date    Anxiety disorder     Basal cell carcinoma of skin     Chronic insomnia     Coronary artery disease     COVID-19 2020    mild case     Dyslipidemia     Fibromyalgia     Gastroesophageal reflux disease     Interstitial cystitis     Irritable bowel syndrome     Ischemic colitis     Multiple thyroid nodules     S/P thyroid bx 2019-benign    Osteopenia     Overactive bladder     Squamous cell carcinoma of skin     Systolic congestive heart failure (HCC)    ,   Past Surgical History:   Procedure Laterality Date    BREAST BIOPSY Left     BREAST SURGERY Bilateral     CARDIAC CATHETERIZATION  2018      Principal Discharge DX:	Gastroenteritis

## 2025-08-05 ENCOUNTER — HOSPITAL ENCOUNTER (OUTPATIENT)
Dept: MRI IMAGING | Age: 70
Discharge: HOME | End: 2025-08-07
Payer: MEDICARE

## 2025-08-05 DIAGNOSIS — S82.61XA CLOSED LOW LATERAL MALLEOLUS FRACTURE, RIGHT, INITIAL ENCOUNTER: ICD-10-CM

## 2025-08-05 PROCEDURE — 73721 MRI JNT OF LWR EXTRE W/O DYE: CPT

## 2025-08-07 ENCOUNTER — OFFICE VISIT (OUTPATIENT)
Dept: ORTHOPEDIC SURGERY | Age: 70
End: 2025-08-07
Payer: MEDICARE

## 2025-08-07 DIAGNOSIS — M89.8X6 PAIN OF RIGHT TIBIA: ICD-10-CM

## 2025-08-07 DIAGNOSIS — M25.571 ACUTE RIGHT ANKLE PAIN: Primary | ICD-10-CM

## 2025-08-07 DIAGNOSIS — S82.61XA CLOSED LOW LATERAL MALLEOLUS FRACTURE, RIGHT, INITIAL ENCOUNTER: ICD-10-CM

## 2025-08-07 PROCEDURE — 1036F TOBACCO NON-USER: CPT | Performed by: ORTHOPAEDIC SURGERY

## 2025-08-07 PROCEDURE — 1123F ACP DISCUSS/DSCN MKR DOCD: CPT | Performed by: ORTHOPAEDIC SURGERY

## 2025-08-07 PROCEDURE — 1090F PRES/ABSN URINE INCON ASSESS: CPT | Performed by: ORTHOPAEDIC SURGERY

## 2025-08-07 PROCEDURE — G8420 CALC BMI NORM PARAMETERS: HCPCS | Performed by: ORTHOPAEDIC SURGERY

## 2025-08-07 PROCEDURE — 99214 OFFICE O/P EST MOD 30 MIN: CPT | Performed by: ORTHOPAEDIC SURGERY

## 2025-08-07 PROCEDURE — G8428 CUR MEDS NOT DOCUMENT: HCPCS | Performed by: ORTHOPAEDIC SURGERY

## 2025-08-07 PROCEDURE — G8399 PT W/DXA RESULTS DOCUMENT: HCPCS | Performed by: ORTHOPAEDIC SURGERY

## 2025-08-07 PROCEDURE — 3017F COLORECTAL CA SCREEN DOC REV: CPT | Performed by: ORTHOPAEDIC SURGERY

## 2025-08-11 ENCOUNTER — CLINICAL SUPPORT (OUTPATIENT)
Dept: UROLOGY | Age: 70
End: 2025-08-11
Payer: MEDICARE

## 2025-08-11 DIAGNOSIS — N39.0 RECURRENT UTI: Primary | ICD-10-CM

## 2025-08-11 LAB
BILIRUBIN, URINE, POC: NEGATIVE
BLOOD URINE, POC: NORMAL
GLUCOSE URINE, POC: NEGATIVE MG/DL
KETONES, URINE, POC: NEGATIVE MG/DL
LEUKOCYTE ESTERASE, URINE, POC: NEGATIVE
NITRITE, URINE, POC: NEGATIVE
PH, URINE, POC: 7 (ref 4.6–8)
PROTEIN,URINE, POC: NEGATIVE MG/DL
SPECIFIC GRAVITY, URINE, POC: 1.01 (ref 1–1.03)
URINALYSIS CLARITY, POC: NORMAL
URINALYSIS COLOR, POC: NORMAL
UROBILINOGEN, POC: NORMAL MG/DL

## 2025-08-11 PROCEDURE — 81003 URINALYSIS AUTO W/O SCOPE: CPT | Performed by: NURSE PRACTITIONER

## 2025-08-11 RX ORDER — VIBEGRON 75 MG/1
75 TABLET, FILM COATED ORAL DAILY
Qty: 90 TABLET | Refills: 3 | Status: SHIPPED | OUTPATIENT
Start: 2025-08-11

## 2025-08-12 DIAGNOSIS — E78.5 DYSLIPIDEMIA: ICD-10-CM

## 2025-08-12 DIAGNOSIS — I25.10 CORONARY ARTERY DISEASE INVOLVING NATIVE CORONARY ARTERY OF NATIVE HEART WITHOUT ANGINA PECTORIS: ICD-10-CM

## 2025-08-12 DIAGNOSIS — I25.10 CORONARY ARTERY DISEASE INVOLVING NATIVE CORONARY ARTERY OF NATIVE HEART WITHOUT ANGINA PECTORIS: Primary | ICD-10-CM

## 2025-08-12 LAB
ALBUMIN SERPL-MCNC: 3.7 G/DL (ref 3.2–4.6)
ALBUMIN/GLOB SERPL: 1.2 (ref 1–1.9)
ALP SERPL-CCNC: 66 U/L (ref 35–104)
ALT SERPL-CCNC: 18 U/L (ref 8–45)
ANION GAP SERPL CALC-SCNC: 9 MMOL/L (ref 7–16)
AST SERPL-CCNC: 21 U/L (ref 15–37)
BILIRUB SERPL-MCNC: 0.2 MG/DL (ref 0–1.2)
BUN SERPL-MCNC: 18 MG/DL (ref 8–23)
CALCIUM SERPL-MCNC: 9.6 MG/DL (ref 8.8–10.2)
CHLORIDE SERPL-SCNC: 104 MMOL/L (ref 98–107)
CHOLEST SERPL-MCNC: 159 MG/DL (ref 0–200)
CO2 SERPL-SCNC: 28 MMOL/L (ref 20–29)
CREAT SERPL-MCNC: 0.74 MG/DL (ref 0.6–1.1)
GLOBULIN SER CALC-MCNC: 3.2 G/DL (ref 2.3–3.5)
GLUCOSE SERPL-MCNC: 89 MG/DL (ref 70–99)
HDLC SERPL-MCNC: 53 MG/DL (ref 40–60)
HDLC SERPL: 3 (ref 0–5)
LDLC SERPL CALC-MCNC: 80 MG/DL (ref 0–100)
POTASSIUM SERPL-SCNC: 4.3 MMOL/L (ref 3.5–5.1)
PROT SERPL-MCNC: 6.9 G/DL (ref 6.3–8.2)
SODIUM SERPL-SCNC: 140 MMOL/L (ref 136–145)
TRIGL SERPL-MCNC: 134 MG/DL (ref 0–150)
VLDLC SERPL CALC-MCNC: 27 MG/DL (ref 6–23)

## 2025-08-18 ENCOUNTER — CLINICAL SUPPORT (OUTPATIENT)
Age: 70
End: 2025-08-18
Payer: MEDICARE

## 2025-08-18 ENCOUNTER — OFFICE VISIT (OUTPATIENT)
Age: 70
End: 2025-08-18
Payer: MEDICARE

## 2025-08-18 VITALS
HEART RATE: 68 BPM | DIASTOLIC BLOOD PRESSURE: 70 MMHG | SYSTOLIC BLOOD PRESSURE: 118 MMHG | HEIGHT: 66 IN | BODY MASS INDEX: 23.24 KG/M2

## 2025-08-18 DIAGNOSIS — I50.22 CHRONIC SYSTOLIC CONGESTIVE HEART FAILURE (HCC): ICD-10-CM

## 2025-08-18 DIAGNOSIS — Z95.810 S/P IMPLANTATION OF AUTOMATIC CARDIOVERTER/DEFIBRILLATOR (AICD): ICD-10-CM

## 2025-08-18 DIAGNOSIS — I44.7 LBBB (LEFT BUNDLE BRANCH BLOCK): ICD-10-CM

## 2025-08-18 DIAGNOSIS — I44.7 LBBB (LEFT BUNDLE BRANCH BLOCK): Primary | ICD-10-CM

## 2025-08-18 DIAGNOSIS — I42.0 DILATED CARDIOMYOPATHY (HCC): ICD-10-CM

## 2025-08-18 DIAGNOSIS — I25.10 CORONARY ARTERY DISEASE INVOLVING NATIVE CORONARY ARTERY OF NATIVE HEART WITHOUT ANGINA PECTORIS: ICD-10-CM

## 2025-08-18 PROCEDURE — 1123F ACP DISCUSS/DSCN MKR DOCD: CPT | Performed by: INTERNAL MEDICINE

## 2025-08-18 PROCEDURE — 3017F COLORECTAL CA SCREEN DOC REV: CPT | Performed by: INTERNAL MEDICINE

## 2025-08-18 PROCEDURE — 99214 OFFICE O/P EST MOD 30 MIN: CPT | Performed by: INTERNAL MEDICINE

## 2025-08-18 PROCEDURE — G8428 CUR MEDS NOT DOCUMENT: HCPCS | Performed by: INTERNAL MEDICINE

## 2025-08-18 PROCEDURE — 1126F AMNT PAIN NOTED NONE PRSNT: CPT | Performed by: INTERNAL MEDICINE

## 2025-08-18 PROCEDURE — G8420 CALC BMI NORM PARAMETERS: HCPCS | Performed by: INTERNAL MEDICINE

## 2025-08-18 PROCEDURE — G8399 PT W/DXA RESULTS DOCUMENT: HCPCS | Performed by: INTERNAL MEDICINE

## 2025-08-18 PROCEDURE — 1090F PRES/ABSN URINE INCON ASSESS: CPT | Performed by: INTERNAL MEDICINE

## 2025-08-18 PROCEDURE — 1036F TOBACCO NON-USER: CPT | Performed by: INTERNAL MEDICINE

## 2025-08-18 PROCEDURE — 93284 PRGRMG EVAL IMPLANTABLE DFB: CPT | Performed by: INTERNAL MEDICINE

## 2025-09-02 ENCOUNTER — OFFICE VISIT (OUTPATIENT)
Dept: ORTHOPEDIC SURGERY | Age: 70
End: 2025-09-02

## 2025-09-02 DIAGNOSIS — S82.61XA CLOSED LOW LATERAL MALLEOLUS FRACTURE, RIGHT, INITIAL ENCOUNTER: Primary | ICD-10-CM

## 2025-09-02 DIAGNOSIS — M25.571 ACUTE RIGHT ANKLE PAIN: ICD-10-CM

## (undated) DEVICE — OCCLUDER CV VES 1.25X12 MM CORONARY INT SIL STRL FLO RST

## (undated) DEVICE — 3000CC GUARDIAN II: Brand: GUARDIAN

## (undated) DEVICE — CATHETER THOR 32FR L23IN PVC 6 EYELET STR ATRAUM

## (undated) DEVICE — CONNECTOR IV 3/8X3/8X3/8 Y

## (undated) DEVICE — SOLUTION IRRIG 1000ML LAC RINGER PLAS POUR BTL

## (undated) DEVICE — STERILE HOOK LOCK LATEX FREE ELASTIC BANDAGE 6INX5YD: Brand: HOOK LOCK™

## (undated) DEVICE — SUTURE ETHBND EXCEL SZ 3-0 L36IN NONABSORBABLE GRN RB-1 X558H

## (undated) DEVICE — SUTURE VCRL SZ 3-0 L27IN ABSRB UD L24MM PS-1 3/8 CIR PRIM J936H

## (undated) DEVICE — SUTURE NONABSORBABLE MONOFILAMENT 5-0 C-1 1X24 IN PROLENE 8725H

## (undated) DEVICE — 48" PROBE COVER W/GEL, ULTRASOUND, STERILE: Brand: SITE-RITE

## (undated) DEVICE — SUTURE PROL SZ 7-0 L24IN NONABSORBABLE BLU L9.3MM BV-1 3/8 M8702

## (undated) DEVICE — CANNULA INJ L2.5IN BLNT TIP 3MM CLR BODY W/ 1 W VLV DLP

## (undated) DEVICE — SUTURE ABSRB 27IN SZ 4-0 17MM RB-1 1/2 CIR TAPR PNT MFIL U207H

## (undated) DEVICE — SUTURE PERMAHAND SZ 2-0 L12X18IN NONABSORBABLE BLK SILK A185H

## (undated) DEVICE — Device

## (undated) DEVICE — (D)STRIP SKN CLSR 0.5X4IN WHT --

## (undated) DEVICE — LEAD PCMKR MYOCARDL BPLR TEMP. --

## (undated) DEVICE — SUTURE VCRL 3-0 L36IN ABSRB UD CT L40MM 1/2 CIR TAPERPOINT J956H

## (undated) DEVICE — SUTURE PROL SZ 6-0 L30IN NONABSORBABLE BLU L13MM CC-1 3/8 M8707

## (undated) DEVICE — (D)PREP SKN CHLRAPRP APPL 26ML -- CONVERT TO ITEM 371833

## (undated) DEVICE — SUTURE NONABSORBABLE L24IN SZ 7-0 M0-5 BV175-8 EP 24 BLU M8745

## (undated) DEVICE — BLADE OPHTH 3MM CUT EDGE NDL

## (undated) DEVICE — AMD ANTIMICROBIAL NON-ADHERENT PAD,0.2% POLYHEXAMETHYLENE BIGUANIDE HCI (PHMB): Brand: TELFA

## (undated) DEVICE — CATH URETH INTMIT ROB 14FR FUN -- USE ITEM 179521

## (undated) DEVICE — CANNULA PERF L1.8MM TIP L1MM S STL SHFT BLB SHP TIP FEM

## (undated) DEVICE — DRAPE SLUSH DISC W44XL66IN ST FOR RND BSIN HUSH SLUSH SYS

## (undated) DEVICE — SUTURE ETHBND EXCEL 2-0 L30IN NONABSORBABLE GRN L26MM SH MX563

## (undated) DEVICE — SUTURE ETHBND EXCEL SZ 0 L18IN NONABSORBABLE GRN L36MM CT-1 CX21D

## (undated) DEVICE — SUTURE COAT VCRL SZ 0 L36IN ABSRB VLT CTX L48MM TAPERPOINT J370H

## (undated) DEVICE — CATHETER ETER TY TEMP SENS F MBO W URIN M FOLLOWS CDC GUIDELINES TO

## (undated) DEVICE — REM POLYHESIVE ADULT PATIENT RETURN ELECTRODE: Brand: VALLEYLAB

## (undated) DEVICE — SUT SLK 0 30IN CT1 BLK --

## (undated) DEVICE — CLAMP INSERT: Brand: STEALTH® CLAMP INSERT

## (undated) DEVICE — BUTTON SWITCH PENCIL BLADE ELECTRODE, HOLSTER: Brand: EDGE

## (undated) DEVICE — AMD ANTIMICROBIAL GAUZE SPONGES,12 PLY USP TYPE VII, 0.2% POLYHEXAMETHYLENE BIGUANIDE HCI (PHMB): Brand: CURITY

## (undated) DEVICE — SUTURE S STL SZ 5 L18IN NONABSORBABLE SIL CCS L48MM 1/2 CIR M653G

## (undated) DEVICE — SUTURE MCRYL SZ 4-0 L27IN ABSRB UD L19MM PS-2 1/2 CIR PRIM Y426H

## (undated) DEVICE — OCCLUDER CV VES 1.5X12 MM CORONARY INT SIL STRL FLO RST

## (undated) DEVICE — MEDI-TRACE CADENCE ADULT, DEFIBRILLATION ELECTRODE -RTS  (10 PR/PK) - ZOLL: Brand: MEDI-TRACE CADENCE

## (undated) DEVICE — BASIC SINGLE BASIN-LF: Brand: MEDLINE INDUSTRIES, INC.

## (undated) DEVICE — SUTURE SILK PERMAHAND PRECUT 6 X 30 IN SZ 1 BLK BRAID A307H

## (undated) DEVICE — BLADE SAW W10XL54MM FOR PRI REPEAT STRNOTMY

## (undated) DEVICE — CLIP INT SM TI EZ LD LIG SYS WECK HORZ

## (undated) DEVICE — SUT PROL 3-0 36IN SH DA BLU --

## (undated) DEVICE — SUTURE PROL SZ 5-0 L24IN NONABSORBABLE BLU L17MM RB-1 1/2 8555H

## (undated) DEVICE — SOLUTION IRRIG 3000ML 0.9% SOD CHL FLX CONT 0797208] ICU MEDICAL INC]

## (undated) DEVICE — Device: Brand: JELCO

## (undated) DEVICE — AMD ANTIMICROBIAL BANDAGE ROLL,6 PLY: Brand: KERLIX

## (undated) DEVICE — CATHETER THOR 32FR L23IN PVC 5 EYELET STR ATRAUM

## (undated) DEVICE — SUTURE PERMAHAND SZ 0 L30IN NONABSORBABLE BLK L30MM PSL 3/8 590H

## (undated) DEVICE — SUT PROL 4-0 36IN RB1 DA BLU --

## (undated) DEVICE — PLEDGET VASC W3/16XL3/8IN THK1/16IN PTFE SFT

## (undated) DEVICE — AORTIC PUNCHES ARE USED TO CREATE A UNIFORM OPENING IN BLOOD VESSELS DURING CARDIOVASCULAR SURGERY. THE VESSEL GRAFT IS INSERTED INTO THE CREATED OPENING AND SUTURED TO THE VESSEL WALL. AORTIC LANCETS ARE USED TO MAKE A SMALL UNIFORM CUT IN A BLOOD VESSEL TO FACILITATE INSERTION OF AN AORTIC PUNCH.  PUNCHES COME IN VARIOUS LENGTHS, DIAMETERS AND TIP CONFIGURATIONS.: Brand: CLEANCUT ROTATING AORTIC PUNCH

## (undated) DEVICE — SOLUTION IV 1000ML 0.9% SOD CHL

## (undated) DEVICE — STERILE HOOK LOCK LATEX FREE ELASTIC BANDAGE 4INX5YD: Brand: HOOK LOCK™